# Patient Record
Sex: MALE | Race: WHITE | Employment: OTHER | URBAN - METROPOLITAN AREA
[De-identification: names, ages, dates, MRNs, and addresses within clinical notes are randomized per-mention and may not be internally consistent; named-entity substitution may affect disease eponyms.]

---

## 2017-02-08 ENCOUNTER — HOSPITAL ENCOUNTER (OUTPATIENT)
Dept: LAB | Age: 72
Discharge: HOME | End: 2017-02-08
Payer: MEDICARE

## 2017-02-08 PROCEDURE — 88305 TISSUE EXAM BY PATHOLOGIST: CPT | Performed by: PLASTIC SURGERY

## 2017-03-29 ENCOUNTER — HOSPITAL ENCOUNTER (OUTPATIENT)
Dept: LAB | Age: 72
Discharge: HOME | End: 2017-03-29
Payer: MEDICARE

## 2017-03-29 PROCEDURE — 88305 TISSUE EXAM BY PATHOLOGIST: CPT | Performed by: PLASTIC SURGERY

## 2017-05-09 ENCOUNTER — HOSPITAL ENCOUNTER (OUTPATIENT)
Dept: GENERAL RADIOLOGY | Age: 72
Discharge: HOME | End: 2017-05-09
Payer: MEDICARE

## 2017-05-09 DIAGNOSIS — J18.9 PNEUMONIA, ORGANISM UNSPECIFIED(486): ICD-10-CM

## 2017-05-09 PROCEDURE — 71020 XR CHEST PA LAT: CPT

## 2017-07-27 ENCOUNTER — HOSPITAL ENCOUNTER (OUTPATIENT)
Dept: LAB | Age: 72
Discharge: HOME | End: 2017-07-27
Payer: MEDICARE

## 2017-07-27 LAB
25(OH)D3 SERPL-MCNC: 55.5 NG/ML (ref 30–100)
ALBUMIN SERPL BCP-MCNC: 4.2 G/DL (ref 3.4–5)
ALBUMIN/GLOB SERPL: 1.4 {RATIO} (ref 0.8–1.7)
ALP SERPL-CCNC: 65 U/L (ref 45–117)
ALT SERPL-CCNC: 46 U/L (ref 16–61)
ANION GAP BLD CALC-SCNC: 8 MMOL/L (ref 3–18)
AST SERPL W P-5'-P-CCNC: 22 U/L (ref 15–37)
BILIRUB SERPL-MCNC: 0.5 MG/DL (ref 0.2–1)
BUN SERPL-MCNC: 22 MG/DL (ref 7–18)
BUN/CREAT SERPL: 19 (ref 12–20)
CALCIUM SERPL-MCNC: 8.5 MG/DL (ref 8.5–10.1)
CHLORIDE SERPL-SCNC: 102 MMOL/L (ref 100–108)
CO2 SERPL-SCNC: 27 MMOL/L (ref 21–32)
CREAT SERPL-MCNC: 1.15 MG/DL (ref 0.6–1.3)
ERYTHROCYTE [DISTWIDTH] IN BLOOD BY AUTOMATED COUNT: 13.8 % (ref 11.6–14.5)
GLOBULIN SER CALC-MCNC: 3.1 G/DL (ref 2–4)
GLUCOSE SERPL-MCNC: 269 MG/DL (ref 74–99)
HCT VFR BLD AUTO: 40 % (ref 36–48)
HGB BLD-MCNC: 13.3 G/DL (ref 13–16)
MCH RBC QN AUTO: 29.2 PG (ref 24–34)
MCHC RBC AUTO-ENTMCNC: 33.3 G/DL (ref 31–37)
MCV RBC AUTO: 87.7 FL (ref 74–97)
PLATELET # BLD AUTO: 199 K/UL (ref 135–420)
PMV BLD AUTO: 10.8 FL (ref 9.2–11.8)
POTASSIUM SERPL-SCNC: 4.1 MMOL/L (ref 3.5–5.5)
PROT SERPL-MCNC: 7.3 G/DL (ref 6.4–8.2)
PSA SERPL-MCNC: 0.5 NG/ML (ref 0–4)
RBC # BLD AUTO: 4.56 M/UL (ref 4.7–5.5)
SODIUM SERPL-SCNC: 137 MMOL/L (ref 136–145)
TSH SERPL DL<=0.05 MIU/L-ACNC: 1.67 UIU/ML (ref 0.36–3.74)
VIT B12 SERPL-MCNC: >2000 PG/ML (ref 211–911)
WBC # BLD AUTO: 7.6 K/UL (ref 4.6–13.2)

## 2017-07-27 PROCEDURE — 36415 COLL VENOUS BLD VENIPUNCTURE: CPT | Performed by: INTERNAL MEDICINE

## 2017-07-27 PROCEDURE — 82306 VITAMIN D 25 HYDROXY: CPT | Performed by: INTERNAL MEDICINE

## 2017-07-27 PROCEDURE — 82607 VITAMIN B-12: CPT | Performed by: INTERNAL MEDICINE

## 2017-07-27 PROCEDURE — 84153 ASSAY OF PSA TOTAL: CPT | Performed by: INTERNAL MEDICINE

## 2017-07-27 PROCEDURE — 85027 COMPLETE CBC AUTOMATED: CPT | Performed by: INTERNAL MEDICINE

## 2017-07-27 PROCEDURE — 84443 ASSAY THYROID STIM HORMONE: CPT | Performed by: INTERNAL MEDICINE

## 2017-07-27 PROCEDURE — 80053 COMPREHEN METABOLIC PANEL: CPT | Performed by: INTERNAL MEDICINE

## 2018-12-17 ENCOUNTER — HOSPITAL ENCOUNTER (OUTPATIENT)
Dept: NON INVASIVE DIAGNOSTICS | Age: 73
Discharge: HOME | End: 2018-12-17
Attending: INTERNAL MEDICINE
Payer: MEDICARE

## 2018-12-17 VITALS
HEIGHT: 71 IN | BODY MASS INDEX: 29.4 KG/M2 | DIASTOLIC BLOOD PRESSURE: 90 MMHG | WEIGHT: 210 LBS | SYSTOLIC BLOOD PRESSURE: 150 MMHG

## 2018-12-17 DIAGNOSIS — I25.84 CORONARY ATHEROSCLEROSIS DUE TO SEVERELY CALCIFIED CORONARY LESION: ICD-10-CM

## 2018-12-17 LAB
STRESS ANGINA INDEX: 0
STRESS BASELINE DIAS BP: 90 MMHG
STRESS BASELINE HR: 67 BPM
STRESS BASELINE SYS BP: 150 MMHG
STRESS ESTIMATED WORKLOAD: 7 METS
STRESS EXERCISE DUR MIN: NORMAL
STRESS PEAK DIAS BP: 90 MMHG
STRESS PEAK SYS BP: 180 MMHG
STRESS PERCENT HR ACHIEVED: 119 %
STRESS POST PEAK HR: 151 BPM
STRESS RATE PRESSURE PRODUCT: NORMAL BPM*MMHG
STRESS TARGET HR: 127 BPM

## 2018-12-17 PROCEDURE — A9500 TC99M SESTAMIBI: HCPCS

## 2018-12-17 PROCEDURE — 74011250636 HC RX REV CODE- 250/636: Performed by: INTERNAL MEDICINE

## 2018-12-17 RX ORDER — SODIUM CHLORIDE 9 MG/ML
250 INJECTION, SOLUTION INTRAVENOUS ONCE
Status: COMPLETED | OUTPATIENT
Start: 2018-12-17 | End: 2018-12-17

## 2018-12-17 RX ADMIN — SODIUM CHLORIDE 250 ML: 900 INJECTION, SOLUTION INTRAVENOUS at 09:30

## 2019-02-03 ENCOUNTER — APPOINTMENT (OUTPATIENT)
Dept: CT IMAGING | Age: 74
DRG: 234 | End: 2019-02-03
Attending: EMERGENCY MEDICINE
Payer: MEDICARE

## 2019-02-03 ENCOUNTER — APPOINTMENT (OUTPATIENT)
Dept: NON INVASIVE DIAGNOSTICS | Age: 74
DRG: 234 | End: 2019-02-03
Attending: PHYSICIAN ASSISTANT
Payer: MEDICARE

## 2019-02-03 ENCOUNTER — HOSPITAL ENCOUNTER (INPATIENT)
Age: 74
LOS: 9 days | Discharge: HOME HEALTH | DRG: 234 | End: 2019-02-13
Attending: EMERGENCY MEDICINE | Admitting: INTERNAL MEDICINE
Payer: MEDICARE

## 2019-02-03 ENCOUNTER — APPOINTMENT (OUTPATIENT)
Dept: GENERAL RADIOLOGY | Age: 74
DRG: 234 | End: 2019-02-03
Attending: EMERGENCY MEDICINE
Payer: MEDICARE

## 2019-02-03 DIAGNOSIS — Z95.1 S/P CABG X 5: ICD-10-CM

## 2019-02-03 DIAGNOSIS — R07.9 CHEST PAIN, UNSPECIFIED TYPE: ICD-10-CM

## 2019-02-03 DIAGNOSIS — I20.0 UNSTABLE ANGINA (HCC): Primary | ICD-10-CM

## 2019-02-03 LAB
ALBUMIN SERPL-MCNC: 3.9 G/DL (ref 3.4–5)
ALBUMIN SERPL-MCNC: 4.4 G/DL (ref 3.4–5)
ALBUMIN/GLOB SERPL: 1.1 {RATIO} (ref 0.8–1.7)
ALBUMIN/GLOB SERPL: 1.2 {RATIO} (ref 0.8–1.7)
ALP SERPL-CCNC: 58 U/L (ref 45–117)
ALP SERPL-CCNC: 72 U/L (ref 45–117)
ALT SERPL-CCNC: 47 U/L (ref 16–61)
ALT SERPL-CCNC: 52 U/L (ref 16–61)
ANION GAP SERPL CALC-SCNC: 7 MMOL/L (ref 3–18)
ANION GAP SERPL CALC-SCNC: 8 MMOL/L (ref 3–18)
APPEARANCE UR: CLEAR
APTT PPP: 133.7 SEC (ref 23–36.4)
APTT PPP: >180 SEC (ref 23–36.4)
AST SERPL-CCNC: 24 U/L (ref 15–37)
AST SERPL-CCNC: 63 U/L (ref 15–37)
BASOPHILS # BLD: 0 K/UL (ref 0–0.1)
BASOPHILS NFR BLD: 0 % (ref 0–2)
BILIRUB SERPL-MCNC: 0.3 MG/DL (ref 0.2–1)
BILIRUB SERPL-MCNC: 0.5 MG/DL (ref 0.2–1)
BILIRUB UR QL: NEGATIVE
BUN SERPL-MCNC: 10 MG/DL (ref 7–18)
BUN SERPL-MCNC: 17 MG/DL (ref 7–18)
BUN/CREAT SERPL: 10 (ref 12–20)
BUN/CREAT SERPL: 14 (ref 12–20)
CALCIUM SERPL-MCNC: 8.2 MG/DL (ref 8.5–10.1)
CALCIUM SERPL-MCNC: 8.7 MG/DL (ref 8.5–10.1)
CHLORIDE SERPL-SCNC: 105 MMOL/L (ref 100–108)
CHLORIDE SERPL-SCNC: 106 MMOL/L (ref 100–108)
CK MB CFR SERPL CALC: 1.4 % (ref 0–4)
CK MB CFR SERPL CALC: 4.2 % (ref 0–4)
CK MB CFR SERPL CALC: 5.4 % (ref 0–4)
CK MB CFR SERPL CALC: 6.8 % (ref 0–4)
CK MB SERPL-MCNC: 2.2 NG/ML (ref 5–25)
CK MB SERPL-MCNC: 23.4 NG/ML (ref 5–25)
CK MB SERPL-MCNC: 25.1 NG/ML (ref 5–25)
CK MB SERPL-MCNC: 27.2 NG/ML (ref 5–25)
CK SERPL-CCNC: 152 U/L (ref 39–308)
CK SERPL-CCNC: 367 U/L (ref 39–308)
CK SERPL-CCNC: 508 U/L (ref 39–308)
CK SERPL-CCNC: 555 U/L (ref 39–308)
CO2 SERPL-SCNC: 25 MMOL/L (ref 21–32)
CO2 SERPL-SCNC: 25 MMOL/L (ref 21–32)
COLOR UR: YELLOW
CREAT SERPL-MCNC: 1.05 MG/DL (ref 0.6–1.3)
CREAT SERPL-MCNC: 1.23 MG/DL (ref 0.6–1.3)
DIFFERENTIAL METHOD BLD: ABNORMAL
ECHO AO ASC DIAM: 3.09 CM
ECHO AO ROOT DIAM: 2.83 CM
ECHO LA AREA 4C: 16.3 CM2
ECHO LA VOL 2C: 66.69 ML (ref 18–58)
ECHO LA VOL 4C: 41.3 ML (ref 18–58)
ECHO LA VOL BP: 57.84 ML (ref 18–58)
ECHO LA VOL/BSA BIPLANE: 26.07 ML/M2 (ref 16–28)
ECHO LA VOLUME INDEX A2C: 30.05 ML/M2 (ref 16–28)
ECHO LA VOLUME INDEX A4C: 18.61 ML/M2 (ref 16–28)
ECHO LV E' LATERAL VELOCITY: 12.87 CM/S
ECHO LV E' SEPTAL VELOCITY: 11.21 CM/S
ECHO LV INTERNAL DIMENSION DIASTOLIC: 4.44 CM (ref 4.2–5.9)
ECHO LV INTERNAL DIMENSION SYSTOLIC: 3.11 CM
ECHO LV IVSD: 0.81 CM (ref 0.6–1)
ECHO LV MASS 2D: 136.7 G (ref 88–224)
ECHO LV MASS INDEX 2D: 61.6 G/M2 (ref 49–115)
ECHO LV POSTERIOR WALL DIASTOLIC: 0.89 CM (ref 0.6–1)
ECHO LVOT DIAM: 2.06 CM
ECHO LVOT PEAK GRADIENT: 4.5 MMHG
ECHO LVOT PEAK VELOCITY: 105.69 CM/S
ECHO LVOT VTI: 19.51 CM
ECHO MV A VELOCITY: 82.63 CM/S
ECHO MV E DECELERATION TIME (DT): 168.5 MS
ECHO MV E VELOCITY: 0.62 CM/S
ECHO MV E/A RATIO: 0.01
ECHO MV E/E' LATERAL: 0.05
ECHO MV E/E' RATIO (AVERAGED): 0.05
ECHO MV E/E' SEPTAL: 0.06
ECHO PULMONARY ARTERY SYSTOLIC PRESSURE (PASP): 29 MMHG
ECHO RV TAPSE: 2.76 CM (ref 1.5–2)
ECHO TV REGURGITANT MAX VELOCITY: 252.97 CM/S
ECHO TV REGURGITANT PEAK GRADIENT: 25.6 MMHG
EOSINOPHIL # BLD: 0 K/UL (ref 0–0.4)
EOSINOPHIL NFR BLD: 0 % (ref 0–5)
ERYTHROCYTE [DISTWIDTH] IN BLOOD BY AUTOMATED COUNT: 13.1 % (ref 11.6–14.5)
ERYTHROCYTE [DISTWIDTH] IN BLOOD BY AUTOMATED COUNT: 13.3 % (ref 11.6–14.5)
EST. AVERAGE GLUCOSE BLD GHB EST-MCNC: 154 MG/DL
FLUAV AG NPH QL IA: NEGATIVE
FLUBV AG NOSE QL IA: NEGATIVE
GLOBULIN SER CALC-MCNC: 3.3 G/DL (ref 2–4)
GLOBULIN SER CALC-MCNC: 3.9 G/DL (ref 2–4)
GLUCOSE BLD STRIP.AUTO-MCNC: 144 MG/DL (ref 70–110)
GLUCOSE BLD STRIP.AUTO-MCNC: 144 MG/DL (ref 70–110)
GLUCOSE BLD STRIP.AUTO-MCNC: 175 MG/DL (ref 70–110)
GLUCOSE BLD STRIP.AUTO-MCNC: 184 MG/DL (ref 70–110)
GLUCOSE SERPL-MCNC: 133 MG/DL (ref 74–99)
GLUCOSE SERPL-MCNC: 218 MG/DL (ref 74–99)
GLUCOSE UR STRIP.AUTO-MCNC: >1000 MG/DL
HBA1C MFR BLD: 7 % (ref 4.2–5.6)
HCT VFR BLD AUTO: 38.1 % (ref 36–48)
HCT VFR BLD AUTO: 42 % (ref 36–48)
HGB BLD-MCNC: 12.8 G/DL (ref 13–16)
HGB BLD-MCNC: 14.4 G/DL (ref 13–16)
HGB UR QL STRIP: NEGATIVE
INR PPP: 1.3 (ref 0.8–1.2)
KETONES UR QL STRIP.AUTO: 15 MG/DL
LACTATE BLD-SCNC: 1.39 MMOL/L (ref 0.4–2)
LEUKOCYTE ESTERASE UR QL STRIP.AUTO: NEGATIVE
LYMPHOCYTES # BLD: 0.6 K/UL (ref 0.9–3.6)
LYMPHOCYTES NFR BLD: 7 % (ref 21–52)
MCH RBC QN AUTO: 29 PG (ref 24–34)
MCH RBC QN AUTO: 29.5 PG (ref 24–34)
MCHC RBC AUTO-ENTMCNC: 33.6 G/DL (ref 31–37)
MCHC RBC AUTO-ENTMCNC: 34.3 G/DL (ref 31–37)
MCV RBC AUTO: 86.1 FL (ref 74–97)
MCV RBC AUTO: 86.4 FL (ref 74–97)
MONOCYTES # BLD: 0.9 K/UL (ref 0.05–1.2)
MONOCYTES NFR BLD: 10 % (ref 3–10)
NEUTS SEG # BLD: 7.3 K/UL (ref 1.8–8)
NEUTS SEG NFR BLD: 83 % (ref 40–73)
NITRITE UR QL STRIP.AUTO: NEGATIVE
PH UR STRIP: 6.5 [PH] (ref 5–8)
PLATELET # BLD AUTO: 178 K/UL (ref 135–420)
PLATELET # BLD AUTO: 183 K/UL (ref 135–420)
PMV BLD AUTO: 10.2 FL (ref 9.2–11.8)
PMV BLD AUTO: 10.5 FL (ref 9.2–11.8)
POTASSIUM SERPL-SCNC: 3.9 MMOL/L (ref 3.5–5.5)
POTASSIUM SERPL-SCNC: 4.1 MMOL/L (ref 3.5–5.5)
PROT SERPL-MCNC: 7.2 G/DL (ref 6.4–8.2)
PROT SERPL-MCNC: 8.3 G/DL (ref 6.4–8.2)
PROT UR STRIP-MCNC: NEGATIVE MG/DL
PROTHROMBIN TIME: 15.8 SEC (ref 11.5–15.2)
RBC # BLD AUTO: 4.41 M/UL (ref 4.7–5.5)
RBC # BLD AUTO: 4.88 M/UL (ref 4.7–5.5)
SODIUM SERPL-SCNC: 138 MMOL/L (ref 136–145)
SODIUM SERPL-SCNC: 138 MMOL/L (ref 136–145)
SP GR UR REFRACTOMETRY: 1.01 (ref 1–1.03)
TROPONIN I SERPL-MCNC: 0.02 NG/ML (ref 0–0.04)
TROPONIN I SERPL-MCNC: 3.52 NG/ML (ref 0–0.04)
TROPONIN I SERPL-MCNC: 7.34 NG/ML (ref 0–0.04)
TROPONIN I SERPL-MCNC: 9.09 NG/ML (ref 0–0.04)
TSH SERPL DL<=0.05 MIU/L-ACNC: 0.45 UIU/ML (ref 0.36–3.74)
UROBILINOGEN UR QL STRIP.AUTO: 0.2 EU/DL (ref 0.2–1)
WBC # BLD AUTO: 6.2 K/UL (ref 4.6–13.2)
WBC # BLD AUTO: 8.7 K/UL (ref 4.6–13.2)

## 2019-02-03 PROCEDURE — 65390000012 HC CONDITION CODE 44 OBSERVATION

## 2019-02-03 PROCEDURE — 87804 INFLUENZA ASSAY W/OPTIC: CPT

## 2019-02-03 PROCEDURE — 82550 ASSAY OF CK (CPK): CPT

## 2019-02-03 PROCEDURE — 85027 COMPLETE CBC AUTOMATED: CPT

## 2019-02-03 PROCEDURE — 74011250636 HC RX REV CODE- 250/636: Performed by: PHYSICIAN ASSISTANT

## 2019-02-03 PROCEDURE — 99285 EMERGENCY DEPT VISIT HI MDM: CPT

## 2019-02-03 PROCEDURE — 74011250636 HC RX REV CODE- 250/636: Performed by: EMERGENCY MEDICINE

## 2019-02-03 PROCEDURE — 96360 HYDRATION IV INFUSION INIT: CPT

## 2019-02-03 PROCEDURE — 83605 ASSAY OF LACTIC ACID: CPT

## 2019-02-03 PROCEDURE — 93306 TTE W/DOPPLER COMPLETE: CPT

## 2019-02-03 PROCEDURE — 36415 COLL VENOUS BLD VENIPUNCTURE: CPT

## 2019-02-03 PROCEDURE — 71275 CT ANGIOGRAPHY CHEST: CPT

## 2019-02-03 PROCEDURE — 74011250637 HC RX REV CODE- 250/637: Performed by: EMERGENCY MEDICINE

## 2019-02-03 PROCEDURE — 74011250637 HC RX REV CODE- 250/637: Performed by: INTERNAL MEDICINE

## 2019-02-03 PROCEDURE — 74011250637 HC RX REV CODE- 250/637: Performed by: PHYSICIAN ASSISTANT

## 2019-02-03 PROCEDURE — 96361 HYDRATE IV INFUSION ADD-ON: CPT

## 2019-02-03 PROCEDURE — 85730 THROMBOPLASTIN TIME PARTIAL: CPT

## 2019-02-03 PROCEDURE — 77010033678 HC OXYGEN DAILY

## 2019-02-03 PROCEDURE — 99218 HC RM OBSERVATION: CPT

## 2019-02-03 PROCEDURE — 71045 X-RAY EXAM CHEST 1 VIEW: CPT

## 2019-02-03 PROCEDURE — 93005 ELECTROCARDIOGRAM TRACING: CPT

## 2019-02-03 PROCEDURE — 74011636637 HC RX REV CODE- 636/637: Performed by: INTERNAL MEDICINE

## 2019-02-03 PROCEDURE — 87040 BLOOD CULTURE FOR BACTERIA: CPT

## 2019-02-03 PROCEDURE — 80053 COMPREHEN METABOLIC PANEL: CPT

## 2019-02-03 PROCEDURE — 85025 COMPLETE CBC W/AUTO DIFF WBC: CPT

## 2019-02-03 PROCEDURE — 83036 HEMOGLOBIN GLYCOSYLATED A1C: CPT

## 2019-02-03 PROCEDURE — 74011250636 HC RX REV CODE- 250/636: Performed by: INTERNAL MEDICINE

## 2019-02-03 PROCEDURE — 82962 GLUCOSE BLOOD TEST: CPT

## 2019-02-03 PROCEDURE — 74011636320 HC RX REV CODE- 636/320: Performed by: EMERGENCY MEDICINE

## 2019-02-03 PROCEDURE — 84443 ASSAY THYROID STIM HORMONE: CPT

## 2019-02-03 PROCEDURE — 85610 PROTHROMBIN TIME: CPT

## 2019-02-03 PROCEDURE — 81003 URINALYSIS AUTO W/O SCOPE: CPT

## 2019-02-03 RX ORDER — FUROSEMIDE 10 MG/ML
20 INJECTION INTRAMUSCULAR; INTRAVENOUS DAILY
Status: DISCONTINUED | OUTPATIENT
Start: 2019-02-03 | End: 2019-02-04

## 2019-02-03 RX ORDER — HEPARIN SODIUM 1000 [USP'U]/ML
60 INJECTION, SOLUTION INTRAVENOUS; SUBCUTANEOUS ONCE
Status: COMPLETED | OUTPATIENT
Start: 2019-02-03 | End: 2019-02-03

## 2019-02-03 RX ORDER — MELOXICAM 7.5 MG/1
15 TABLET ORAL DAILY
Status: DISCONTINUED | OUTPATIENT
Start: 2019-02-03 | End: 2019-02-03

## 2019-02-03 RX ORDER — ACETAMINOPHEN 325 MG/1
650 TABLET ORAL
Status: DISCONTINUED | OUTPATIENT
Start: 2019-02-03 | End: 2019-02-08

## 2019-02-03 RX ORDER — OXYCODONE AND ACETAMINOPHEN 5; 325 MG/1; MG/1
1-2 TABLET ORAL
Status: DISCONTINUED | OUTPATIENT
Start: 2019-02-03 | End: 2019-02-08

## 2019-02-03 RX ORDER — SODIUM CHLORIDE 0.9 % (FLUSH) 0.9 %
5-40 SYRINGE (ML) INJECTION AS NEEDED
Status: DISCONTINUED | OUTPATIENT
Start: 2019-02-03 | End: 2019-02-08

## 2019-02-03 RX ORDER — METOPROLOL TARTRATE 25 MG/1
12.5 TABLET, FILM COATED ORAL EVERY 12 HOURS
Status: DISCONTINUED | OUTPATIENT
Start: 2019-02-03 | End: 2019-02-04

## 2019-02-03 RX ORDER — INSULIN LISPRO 100 [IU]/ML
INJECTION, SOLUTION INTRAVENOUS; SUBCUTANEOUS
Status: DISCONTINUED | OUTPATIENT
Start: 2019-02-03 | End: 2019-02-04

## 2019-02-03 RX ORDER — AZITHROMYCIN 250 MG/1
500 TABLET, FILM COATED ORAL DAILY
Status: DISCONTINUED | OUTPATIENT
Start: 2019-02-03 | End: 2019-02-04

## 2019-02-03 RX ORDER — ADHESIVE BANDAGE
30 BANDAGE TOPICAL DAILY PRN
Status: DISCONTINUED | OUTPATIENT
Start: 2019-02-03 | End: 2019-02-08

## 2019-02-03 RX ORDER — ATORVASTATIN CALCIUM 40 MG/1
40 TABLET, FILM COATED ORAL DAILY
Status: DISCONTINUED | OUTPATIENT
Start: 2019-02-03 | End: 2019-02-13 | Stop reason: HOSPADM

## 2019-02-03 RX ORDER — SODIUM CHLORIDE 0.9 % (FLUSH) 0.9 %
5-10 SYRINGE (ML) INJECTION AS NEEDED
Status: DISCONTINUED | OUTPATIENT
Start: 2019-02-03 | End: 2019-02-07 | Stop reason: SDUPTHER

## 2019-02-03 RX ORDER — HEPARIN SODIUM 10000 [USP'U]/100ML
12-25 INJECTION, SOLUTION INTRAVENOUS
Status: DISCONTINUED | OUTPATIENT
Start: 2019-02-03 | End: 2019-02-04

## 2019-02-03 RX ORDER — GUAIFENESIN 100 MG/5ML
324 LIQUID (ML) ORAL
Status: COMPLETED | OUTPATIENT
Start: 2019-02-03 | End: 2019-02-03

## 2019-02-03 RX ORDER — PIOGLITAZONEHYDROCHLORIDE 30 MG/1
30 TABLET ORAL DAILY
COMMUNITY

## 2019-02-03 RX ORDER — GLIMEPIRIDE 2 MG/1
1 TABLET ORAL
Status: DISCONTINUED | OUTPATIENT
Start: 2019-02-03 | End: 2019-02-03

## 2019-02-03 RX ORDER — METFORMIN HYDROCHLORIDE 500 MG/1
1000 TABLET, EXTENDED RELEASE ORAL 2 TIMES DAILY
Status: DISCONTINUED | OUTPATIENT
Start: 2019-02-03 | End: 2019-02-03

## 2019-02-03 RX ORDER — SODIUM CHLORIDE 0.9 % (FLUSH) 0.9 %
5-40 SYRINGE (ML) INJECTION EVERY 8 HOURS
Status: DISCONTINUED | OUTPATIENT
Start: 2019-02-03 | End: 2019-02-08

## 2019-02-03 RX ORDER — PANTOPRAZOLE SODIUM 40 MG/1
40 TABLET, DELAYED RELEASE ORAL
Status: DISCONTINUED | OUTPATIENT
Start: 2019-02-04 | End: 2019-02-13 | Stop reason: HOSPADM

## 2019-02-03 RX ORDER — GUAIFENESIN 100 MG/5ML
81 LIQUID (ML) ORAL DAILY
Status: DISCONTINUED | OUTPATIENT
Start: 2019-02-04 | End: 2019-02-08

## 2019-02-03 RX ORDER — ENOXAPARIN SODIUM 100 MG/ML
40 INJECTION SUBCUTANEOUS EVERY 24 HOURS
Status: DISCONTINUED | OUTPATIENT
Start: 2019-02-03 | End: 2019-02-03

## 2019-02-03 RX ORDER — HEPARIN SODIUM 5000 [USP'U]/ML
5000 INJECTION, SOLUTION INTRAVENOUS; SUBCUTANEOUS EVERY 8 HOURS
Status: DISCONTINUED | OUTPATIENT
Start: 2019-02-03 | End: 2019-02-03

## 2019-02-03 RX ORDER — DOCUSATE SODIUM 100 MG/1
100 CAPSULE, LIQUID FILLED ORAL 2 TIMES DAILY
Status: DISCONTINUED | OUTPATIENT
Start: 2019-02-03 | End: 2019-02-08

## 2019-02-03 RX ORDER — OLMESARTAN MEDOXOMIL 40 MG/1
40 TABLET ORAL DAILY
Status: DISCONTINUED | OUTPATIENT
Start: 2019-02-03 | End: 2019-02-08

## 2019-02-03 RX ADMIN — ACETAMINOPHEN 650 MG: 325 TABLET ORAL at 21:20

## 2019-02-03 RX ADMIN — Medication 10 ML: at 16:38

## 2019-02-03 RX ADMIN — AZITHROMYCIN 500 MG: 250 TABLET, FILM COATED ORAL at 12:36

## 2019-02-03 RX ADMIN — HEPARIN SODIUM 5000 UNITS: 5000 INJECTION INTRAVENOUS; SUBCUTANEOUS at 12:38

## 2019-02-03 RX ADMIN — Medication 10 ML: at 21:20

## 2019-02-03 RX ADMIN — GLIMEPIRIDE 1 MG: 2 TABLET ORAL at 12:39

## 2019-02-03 RX ADMIN — MELOXICAM 15 MG: 7.5 TABLET ORAL at 12:38

## 2019-02-03 RX ADMIN — SODIUM CHLORIDE 1000 ML: 900 INJECTION, SOLUTION INTRAVENOUS at 03:27

## 2019-02-03 RX ADMIN — METOPROLOL TARTRATE 12.5 MG: 25 TABLET ORAL at 08:18

## 2019-02-03 RX ADMIN — IOPAMIDOL 90 ML: 755 INJECTION, SOLUTION INTRAVENOUS at 04:35

## 2019-02-03 RX ADMIN — INSULIN LISPRO 2 UNITS: 100 INJECTION, SOLUTION INTRAVENOUS; SUBCUTANEOUS at 18:44

## 2019-02-03 RX ADMIN — INSULIN LISPRO 4 UNITS: 100 INJECTION, SOLUTION INTRAVENOUS; SUBCUTANEOUS at 12:35

## 2019-02-03 RX ADMIN — METOPROLOL TARTRATE 12.5 MG: 25 TABLET ORAL at 21:19

## 2019-02-03 RX ADMIN — NITROGLYCERIN 1 INCH: 20 OINTMENT TOPICAL at 01:58

## 2019-02-03 RX ADMIN — HEPARIN SODIUM 12 UNITS/KG/HR: 10000 INJECTION, SOLUTION INTRAVENOUS at 15:41

## 2019-02-03 RX ADMIN — ASPIRIN 81 MG 324 MG: 81 TABLET ORAL at 01:59

## 2019-02-03 RX ADMIN — HEPARIN SODIUM 5850 UNITS: 1000 INJECTION INTRAVENOUS; SUBCUTANEOUS at 15:39

## 2019-02-03 RX ADMIN — SODIUM CHLORIDE 925 ML: 900 INJECTION, SOLUTION INTRAVENOUS at 06:04

## 2019-02-03 RX ADMIN — SODIUM CHLORIDE 1000 ML: 900 INJECTION, SOLUTION INTRAVENOUS at 01:59

## 2019-02-03 RX ADMIN — INSULIN LISPRO 2 UNITS: 100 INJECTION, SOLUTION INTRAVENOUS; SUBCUTANEOUS at 21:17

## 2019-02-03 RX ADMIN — DOCUSATE SODIUM 100 MG: 100 CAPSULE, LIQUID FILLED ORAL at 18:44

## 2019-02-03 RX ADMIN — INSULIN LISPRO 4 UNITS: 100 INJECTION, SOLUTION INTRAVENOUS; SUBCUTANEOUS at 08:15

## 2019-02-03 RX ADMIN — ACETAMINOPHEN 650 MG: 325 TABLET ORAL at 15:30

## 2019-02-03 RX ADMIN — ATORVASTATIN CALCIUM 40 MG: 40 TABLET, FILM COATED ORAL at 12:38

## 2019-02-03 NOTE — CONSULTS
[facility] - Consult Note    Consultation request by Salinas Canales MD for advice/opinion related to evaluating Chest pain  Chest pain    Date of  Admission: 2/3/2019  1:23 AM   Primary Care Physician:  Ronak Wyatt MD     Assessment:     Patient Active Problem List   Diagnosis Code    Biliary colic H75.85    Chest pain R07.9     -Chest pain- history is consistent with possible CAD but he may also have an acute bronchitis for the last day. He had a nuclear stress test in December 2018 that was without ischemia concerns. -HTN- elevated on examination but says he has been stable at home.  -DM Type II on oral meds  -Dyslipidemia- on statin    -No primary cardiologist, Dr. Tru Jean PMD     Plan:     -Concerned for possible CAD and will discuss with MD about scheduling for a cardiac cath in the am. He has multiple risk factors, and while he has a mild bronchitis there is concern that he may have some underlying disease.  -Will also obtain echo to examine wall function and EF  -Would not heparinize at this juncture  -Serial enzymes and biomarkers along with EKG's  -Continue with ARB and have added ASA daily and BB.  -More recommendations pending clinical course  -Will follow     History of Present Illness: This is a 72 y.o. male admitted for Chest pain  Chest pain. Patient complains of:  Patient admitted for chest pain. He states that yesterday he started coughing all day and during the evening he felt a ache to his chest, radiating to his neck and L arm. This pain lasted for 2 hours with spontaneous relief. He also admits to having shortness of breath at times with regular activity. At the same time he states that he runs on the treadmill three times a week and has very good exercise tolerance. He had a subjective fever yesterday as well. No vomiting or diarrhea noted.       Cardiac risk factors: family history, dyslipidemia, diabetes mellitus, male gender, hypertension, age      Review of Symptoms:  Except as stated above include:  Constitutional:  negative  Respiratory:  negative  Cardiovascular:  negative  Gastrointestinal: negative  Genitourinary:  negative  Musculoskeletal:  Negative  Neurological:  Negative  Dermatological:  Negative  Endocrinological: Negative  Psychological:  Negative    A comprehensive review of systems was negative except for that written in the HPI. Past Medical History:     Past Medical History:   Diagnosis Date    Arthritis     Diabetes ([facility] [address]     Hypertension          Social History:     Social History     Socioeconomic History    Marital status:      Spouse name: Not on file    Number of children: Not on file    Years of education: Not on file    Highest education level: Not on file   Tobacco Use    Smoking status: Former Smoker   Substance and Sexual Activity    Alcohol use: Yes     Comment: rarely    Drug use: No        Family History:   No family history on file. Medications: Allergies   Allergen Reactions    Augmentin [Amoxicillin-Pot Clavulanate] Other (comments)     Turns his tongue black        Current Facility-Administered Medications   Medication Dose Route Frequency    sodium chloride (NS) flush 5-10 mL  5-10 mL IntraVENous PRN    olmesartan (BENICAR) tablet 40 mg  40 mg Oral DAILY    insulin lispro (HUMALOG) injection   SubCUTAneous AC&HS     Current Outpatient Medications   Medication Sig    oxyCODONE-acetaminophen (PERCOCET) 5-325 mg per tablet Take 1-2 Tabs by mouth every six (6) hours as needed for Pain. Max Daily Amount: 8 Tabs.  glimepiride (AMARYL) 1 mg tablet Take 1 mg by mouth every morning. Indications: TYPE 2 DIABETES MELLITUS    atorvastatin (LIPITOR) 40 mg tablet Take 40 mg by mouth daily.  olmesartan (BENICAR) 20 mg tablet Take 20 mg by mouth daily.  meloxicam (MOBIC) 15 mg tablet Take 15 mg by mouth daily.  exenatide microspheres (BYDUREON) 2 mg serr by SubCUTAneous route.     omeprazole (PRILOSEC) 20 mg capsule Take 20 mg by mouth daily.  cholecalciferol, vitamin D3, (VITAMIN D3) 2,000 unit tab Take  by mouth.  METFORMIN HCL (METFORMIN PO) Take  by mouth. Physical Exam:     Visit Vitals  /73   Pulse 78   Temp 98.5 °F (36.9 °C)   Resp 20   Ht 6' 1\" (1.854 m)   Wt 97.5 kg (215 lb)   SpO2 98%   BMI 28.37 kg/m²     BP Readings from Last 3 Encounters:   02/03/19 146/73   12/17/18 150/90   11/12/15 132/74     Pulse Readings from Last 3 Encounters:   02/03/19 78   11/12/15 99   10/12/15 66     Wt Readings from Last 3 Encounters:   02/03/19 97.5 kg (215 lb)   12/17/18 95.3 kg (210 lb)   11/25/15 85.7 kg (189 lb)       General:  alert, cooperative, no distress, appears stated age  Neck:  nontender, no JVD  Lungs:  clear to auscultation bilaterally  Heart:  regular rate and rhythm, S1, S2 normal, no murmur, click, rub or gallop  Abdomen:  abdomen is soft without significant tenderness, masses, organomegaly or guarding  Extremities:  extremities normal, atraumatic, no cyanosis or edema  Skin: Warm and dry.  no hyperpigmentation, vitiligo, or suspicious lesions  Neuro: alert, oriented x3, affect appropriate, no focal neurological deficits, moves all extremities well, no involuntary movements  Psych: non focal     Data Review:     Recent Labs     02/03/19  0142   WBC 8.7   HGB 14.4   HCT 42.0        Recent Labs     02/03/19  0142      K 3.9      CO2 25   *   BUN 17   CREA 1.23   CA 8.7   ALB 4.4   SGOT 24   ALT 52       Results for orders placed or performed during the hospital encounter of 02/03/19   EKG, 12 LEAD, INITIAL   Result Value Ref Range    Ventricular Rate 83 BPM    Atrial Rate 83 BPM    P-R Interval 150 ms    QRS Duration 98 ms    Q-T Interval 354 ms    QTC Calculation (Bezet) 415 ms    Calculated P Axis 45 degrees    Calculated R Axis -38 degrees    Calculated T Axis 78 degrees    Diagnosis       Normal sinus rhythm  Left axis deviation  Nonspecific ST abnormality  Abnormal ECG  When compared with ECG of 17-DEC-2018 10:15,  No significant change was found         All Cardiac Markers in the last 24 hours:    Lab Results   Component Value Date/Time     02/03/2019 01:42 AM    CKMB 2.2 02/03/2019 01:42 AM    CKND1 1.4 02/03/2019 01:42 AM    TROIQ 0.02 02/03/2019 01:42 AM       Last Lipid:  No results found for: CHOL, CHOLX, CHLST, CHOLV, HDL, LDL, LDLC, DLDLP, TGLX, TRIGL, TRIGP, CHHD, CHHDX    Signed By: JOSE Rodriguez     February 3, 2019

## 2019-02-03 NOTE — Clinical Note
Contrast Dose Calculator:  
Patient's age: 72.  
Patient's sex: Male. Patient weight (kg) = 97.5. Creatinine level (mg/dL) = 1.13. Creatinine clearance (mL/min): 83.  
Contrast concentration (mg/mL) = 300. MACD = 300 mL. Max Contrast dose per Creatinine Cl calculator = 186.75 mL.

## 2019-02-03 NOTE — PROGRESS NOTES
Patient with CP now Increase in Trop - no chest pains C/o cough and headache Patient will need cath in AM - NPO Add IV Heparin and BB Continue ASA Discontinue metformin , amyryl and Mobic For cough likely from - acute bronchitis Continue Zithromax Follow cultures from ED Will Follow with cardiology D/W patient and family in room with him

## 2019-02-03 NOTE — ED PROVIDER NOTES
EMERGENCY DEPARTMENT HISTORY AND PHYSICAL EXAM 
 
4:43 AM 
 
 
Date: 2/3/2019 Patient Name: Mery House. History of Presenting Illness No chief complaint on file. History Provided By: Patient Chief Complaint: CP 
Duration:  11 PM 
Timing:  Acute Location: Not obtained at this time Quality: Not obtained at this time Severity: Moderate Modifying Factors: Took Tylenol and experienced some relief Associated Symptoms: Cough, congestion, and fever Additional History (Context): 4:45 AM Mery Wan is a 72 y.o. male with h/o HTN and diabetes who presents to ED complaining of acute, moderate CP with associated symptoms of cough, congestion, and possible fever onset 11 PM. He took Tylenol PTA and felt relief. The pt stated he has a history of PNA and stated that his symptoms today felt the same as when her had PNA before. He had a stress test done in December and the results were nml. Denies taking Asprin today or being on blood thinners. PCP: Silviano Sanchez MD 
 
 
 
Current Facility-Administered Medications Medication Dose Route Frequency Provider Last Rate Last Dose  metoprolol tartrate (LOPRESSOR) tablet 12.5 mg  12.5 mg Oral ONCE Gobraeel, Clearance MD Marcus      
 metoprolol tartrate (LOPRESSOR) tablet 25 mg  25 mg Oral Q12H Christie Rivas MD      
 sodium chloride (NS) flush 5-10 mL  5-10 mL IntraVENous PRN Clementina Ch DO      
 atorvastatin (LIPITOR) tablet 40 mg  40 mg Oral DAILY Shafqat, Vonne Aschoff, MD   40 mg at 02/03/19 1238  olmesartan (BENICAR) tablet 40 mg  40 mg Oral DAILY Shafqat, Vonne Aschoff, MD      
 pantoprazole (PROTONIX) tablet 40 mg  40 mg Oral ACB Shafqat, Vonne Aschoff, MD      
 oxyCODONE-acetaminophen (PERCOCET) 5-325 mg per tablet 1-2 Tab  1-2 Tab Oral Q6H PRN Shafqat, Vonne Aschoff, MD      
 furosemide (LASIX) injection 20 mg  20 mg IntraVENous DAILY Shafqat, Vonne Aschoff, MD      
  azithromycin (ZITHROMAX) tablet 500 mg  500 mg Oral DAILY Dewey Maldonado MD   500 mg at 19 1236  
 insulin lispro (HUMALOG) injection   SubCUTAneous AC&HS Dewey Maldonado MD   2 Units at 19  aspirin chewable tablet 81 mg  81 mg Oral DAILY Michael Venkat Alanicolama      
 sodium chloride (NS) flush 5-40 mL  5-40 mL IntraVENous Q8H Michael Leonora Robledobama   10 mL at 19  
 sodium chloride (NS) flush 5-40 mL  5-40 mL IntraVENous PRN JOSE Valle      
 magnesium hydroxide (MILK OF MAGNESIA) 400 mg/5 mL oral suspension 30 mL  30 mL Oral DAILY PRN JOSE Valle      
 docusate sodium (COLACE) capsule 100 mg  100 mg Oral BID Claire JOSE Do   100 mg at 19 1844  
 heparin 25,000 units in D5W 250 ml infusion  12-25 Units/kg/hr IntraVENous TITRATE Carolina Vallema 11 mL/hr at 19 2245 1,100 Units/hr at 19 2245  acetaminophen (TYLENOL) tablet 650 mg  650 mg Oral Q4H PRN Viktor Peterson MD   650 mg at 19 Past History Past Medical History: 
Past Medical History:  
Diagnosis Date  Arthritis  Diabetes ([address]  Hypertension Past Surgical History: 
Past Surgical History:  
Procedure Laterality Date  HX HEENT    
 tumor removed from neck  HX ORTHOPAEDIC    
 elbow  LAP,CHOLECYSTECTOMY  11/12/15 Dr. Nacho Skaggs  NEUROLOGICAL PROCEDURE UNLISTED    
 back surgery x2 Family History: 
Family History Problem Relation Age of Onset  Cancer Mother  Heart Failure Father Social History: 
Social History Tobacco Use  Smoking status: Former Smoker Packs/day: 3.00 Years: 33.00 Pack years: 99.00 Last attempt to quit: 1990 Years since quittin.1  Smokeless tobacco: Former User Substance Use Topics  Alcohol use: Yes Comment: rarely  Drug use: No  
 
 
Allergies: Allergies Allergen Reactions  Augmentin [Amoxicillin-Pot Clavulanate] Other (comments) Turns his tongue black Review of Systems Review of Systems Constitutional: Positive for fever. Negative for activity change and fatigue. HENT: Positive for congestion. Negative for rhinorrhea. Eyes: Negative for visual disturbance. Respiratory: Positive for cough. Negative for shortness of breath. Cardiovascular: Positive for chest pain. Negative for palpitations. Gastrointestinal: Negative for abdominal pain, diarrhea, nausea and vomiting. Genitourinary: Negative for dysuria and hematuria. Musculoskeletal: Negative for back pain. Skin: Negative for rash. Neurological: Negative for dizziness, weakness and light-headedness. All other systems reviewed and are negative. Physical Exam  
 
Visit Vitals /83 (BP 1 Location: Right arm, BP Patient Position: At rest) Pulse 77 Temp 98.9 °F (37.2 °C) Resp 18 Ht 6' 1\" (1.854 m) Wt 97.5 kg (215 lb) SpO2 96% BMI 28.37 kg/m² Physical Exam  
Constitutional: He appears well-developed and well-nourished. Hypertensive HENT:  
Head: Normocephalic and atraumatic. Eyes: Conjunctivae are normal. Pupils are equal, round, and reactive to light. Neck: Normal range of motion. Neck supple. Cardiovascular: Normal rate and regular rhythm. Pulmonary/Chest: Effort normal and breath sounds normal.  
Abdominal: Soft. Bowel sounds are normal.  
Musculoskeletal: Normal range of motion. Lymphadenopathy:  
  He has no cervical adenopathy. Neurological: He is alert. Skin: Skin is warm. Psychiatric: He has a normal mood and affect. Diagnostic Study Results Labs - Recent Results (from the past 12 hour(s)) CARDIAC PANEL,(CK, CKMB & TROPONIN) Collection Time: 02/03/19  3:57 PM  
Result Value Ref Range  (H) 39 - 308 U/L  
 CK - MB 27.2 (H) <3.6 ng/ml CK-MB Index 5.4 (H) 0.0 - 4.0 %  Troponin-I, QT 7.34 (HH) 0.0 - 0.045 NG/ML  
 METABOLIC PANEL, COMPREHENSIVE Collection Time: 02/03/19  3:57 PM  
Result Value Ref Range Sodium 138 136 - 145 mmol/L Potassium 4.1 3.5 - 5.5 mmol/L Chloride 106 100 - 108 mmol/L  
 CO2 25 21 - 32 mmol/L Anion gap 7 3.0 - 18 mmol/L Glucose 133 (H) 74 - 99 mg/dL BUN 10 7.0 - 18 MG/DL Creatinine 1.05 0.6 - 1.3 MG/DL  
 BUN/Creatinine ratio 10 (L) 12 - 20 GFR est AA >60 >60 ml/min/1.73m2 GFR est non-AA >60 >60 ml/min/1.73m2 Calcium 8.2 (L) 8.5 - 10.1 MG/DL Bilirubin, total 0.3 0.2 - 1.0 MG/DL  
 ALT (SGPT) 47 16 - 61 U/L  
 AST (SGOT) 63 (H) 15 - 37 U/L Alk. phosphatase 58 45 - 117 U/L Protein, total 7.2 6.4 - 8.2 g/dL Albumin 3.9 3.4 - 5.0 g/dL Globulin 3.3 2.0 - 4.0 g/dL A-G Ratio 1.2 0.8 - 1.7 TSH 3RD GENERATION Collection Time: 02/03/19  3:57 PM  
Result Value Ref Range TSH 0.45 0.36 - 3.74 uIU/mL CBC W/O DIFF Collection Time: 02/03/19  3:57 PM  
Result Value Ref Range WBC 6.2 4.6 - 13.2 K/uL  
 RBC 4.41 (L) 4.70 - 5.50 M/uL  
 HGB 12.8 (L) 13.0 - 16.0 g/dL HCT 38.1 36.0 - 48.0 % MCV 86.4 74.0 - 97.0 FL  
 MCH 29.0 24.0 - 34.0 PG  
 MCHC 33.6 31.0 - 37.0 g/dL  
 RDW 13.3 11.6 - 14.5 % PLATELET 934 020 - 527 K/uL MPV 10.5 9.2 - 11.8 FL PROTHROMBIN TIME + INR Collection Time: 02/03/19  3:57 PM  
Result Value Ref Range Prothrombin time 15.8 (H) 11.5 - 15.2 sec INR 1.3 (H) 0.8 - 1.2 PTT Collection Time: 02/03/19  3:57 PM  
Result Value Ref Range aPTT >180.0 (HH) 23.0 - 36.4 SEC GLUCOSE, POC Collection Time: 02/03/19  4:59 PM  
Result Value Ref Range Glucose (POC) 144 (H) 70 - 110 mg/dL GLUCOSE, POC Collection Time: 02/03/19  9:13 PM  
Result Value Ref Range Glucose (POC) 144 (H) 70 - 110 mg/dL CARDIAC PANEL,(CK, CKMB & TROPONIN) Collection Time: 02/03/19  9:45 PM  
Result Value Ref Range  (H) 39 - 308 U/L  
 CK - MB 23.4 (H) <3.6 ng/ml  CK-MB Index 4.2 (H) 0.0 - 4.0 %  
 Troponin-I, QT 9.09 (HH) 0.0 - 0.045 NG/ML  
PTT Collection Time: 02/03/19  9:45 PM  
Result Value Ref Range aPTT 133.7 (H) 23.0 - 36.4 SEC Radiologic Studies -  
XR CHEST PORT Final Result IMPRESSION: No active process. CTA CHEST W OR W WO CONT Final Result IMPRESSION:  
1. No evidence of pulmonary embolus or aortic dissection. EKG: normal EKG, normal sinus rhythm at 83 with no acute ischemic changes. Medical Decision Making It should be noted that IErma DO 
 will be the provider of record for this patient. I reviewed the vital signs, available nursing notes, past medical history, past surgical history, family history and social history. Vital Signs-Reviewed the patient's vital signs. Records Reviewed: Nursing Notes and Old Medical Records (Time of Review: 4:43 AM) ED Course: Progress Notes, Reevaluation, and Consults: 
CP resolving with NTG Provider Notes (Medical Decision Making):  
Patient with c/o CP. States cough at home No fever in ED No congestion noted Patient is hypertensive Recent neg stress test in Dec 
Given ASA for CP States it is left sided and goes into his L arm. Resolved with NTG 
EKG wnl Trop neg No acute findings on CTA Given concerning story and presentation for unstable angina, and absolutely no sign of infection, will admit for further evaluation Cardiology consulted and will see the patient. D/w hospitalist who will admit. Patient and wife agree with plan. Diagnosis Clinical Impression: 1. Unstable angina (HCC) Disposition: Admit Follow-up Information None Medication List  
  
ASK your doctor about these medications ACTOS 30 mg tablet Generic drug:  pioglitazone BENICAR 20 mg tablet Generic drug:  olmesartan BYDUREON 2 mg Serr Generic drug:  exenatide microspheres 
  
glimepiride 1 mg tablet Commonly known as:  AMARYL 
  
LIPITOR 40 mg tablet Generic drug:  atorvastatin 
  
meloxicam 15 mg tablet Commonly known as:  MOBIC METFORMIN PO 
  
PriLOSEC 20 mg capsule Generic drug:  omeprazole VITAMIN D3 2,000 unit Tab Generic drug:  cholecalciferol (vitamin D3) 
  
  
 
_______________________________ Scribe Attestation I-Kezia Duran acting as a scribe for and in the presence of [address] [place] [address], DO February 03, 2019 at 4:43 AM 
    
Provider Attestation:     
I personally performed the services described in the documentation, reviewed the documentation, as recorded by the scribe in my presence, and it accurately and completely records my words and actions. February 03, 2019 at 4:43 AM - Edna BAY DO   
 
 
_______________________________

## 2019-02-03 NOTE — PROGRESS NOTES
Echocardiogram completed. Patient returned to room with armband in place. Report to follow.  
 
[address] [facility]

## 2019-02-03 NOTE — PROGRESS NOTES
Discharge Planning: 
Reason for Admission:   CHEST PAIN 
                
RRAT Score:    5 Plan for utilizing home health:    PENDING Likelihood of Readmission:  LOW Transition of Care Plan:   HOME WITH FAMILY SUPPORT · Pt downgraded to Code 44 · Code 44 will be printed on behalf of this pt · This writer discussed with pt and family the pt is now on Observation status · OBS paperwork signed by pt and placed in this pt's chart · Pt reports that his plan is to return home with family support Care Management Interventions PCP Verified by CM: Aldo Vera) Last Visit to PCP: 01/09/19 Palliative Care Criteria Met (RRAT>21 & CHF Dx)?: No 
Mode of Transport at Discharge: Other (see comment)(FAMILY) Transition of Care Consult (CM Consult): Discharge Planning Current Support Network: Lives with Spouse, Own Home, Family Lives [facility] Confirm Follow Up Transport: Family Plan discussed with Pt/Family/Caregiver: Yes(SUPPORTIVE FAMILY AND CLOSE GROUP OF SUPPORTIVE FRIENDS) [employer] Information Provided?: No 
Discharge Location Discharge Placement: Home with family assistance [facility] Care Manager [phone]

## 2019-02-03 NOTE — Clinical Note
TRANSFER - IN REPORT:  
 
Verbal report received from: Anna Nolan, RN. Yandy Aguillon Report consisted of patient's Situation, Background, Assessment and  
Recommendations(SBAR). Opportunity for questions and clarification was provided. Assessment completed upon patient's arrival to unit and care assumed. Patient transported with a Cardiac Cath Tech / Patient Care Tech.

## 2019-02-03 NOTE — H&P
History & Physical 
Patient: Jass Russell MRN: [id number]  CSN: [id number] YOB: 1945  Age: 72 y.o. Sex: male DOA: 2/3/2019 Chest pain HPI:  
 
Jass Russell is a 72 y.o. male who has a PMH of HTN, DM came to ER c/o Cp since yesterday. He was resting when the chest pain started. He has been coughing a lot for the last 2 days a lot. He has had fever and chills. He had a normal cardiac stress test in December 2018. He has been wheezing also. Past Medical History:  
Diagnosis Date  Arthritis  Diabetes ([address]  Hypertension Past Surgical History:  
Procedure Laterality Date  HX HEENT    
 tumor removed from neck  HX ORTHOPAEDIC    
 elbow  LAP,CHOLECYSTECTOMY  11/12/15 Dr. Bri Nam  NEUROLOGICAL PROCEDURE UNLISTED    
 back surgery x2 No family history on file. Social History Socioeconomic History  Marital status:  Spouse name: Not on file  Number of children: Not on file  Years of education: Not on file  Highest education level: Not on file Tobacco Use  Smoking status: Former Smoker Substance and Sexual Activity  Alcohol use: Yes Comment: rarely  Drug use: No  
 
 
Prior to Admission medications Medication Sig Start Date End Date Taking? Authorizing Provider  
oxyCODONE-acetaminophen (PERCOCET) 5-325 mg per tablet Take 1-2 Tabs by mouth every six (6) hours as needed for Pain. Max Daily Amount: 8 Tabs. 11/12/15   Yolande Juarez MD  
glimepiride (AMARYL) 1 mg tablet Take 1 mg by mouth every morning. Indications: TYPE 2 DIABETES MELLITUS    Provider, Historical  
atorvastatin (LIPITOR) 40 mg tablet Take 40 mg by mouth daily. Provider, Historical  
olmesartan (BENICAR) 20 mg tablet Take 20 mg by mouth daily. Provider, Historical  
meloxicam (MOBIC) 15 mg tablet Take 15 mg by mouth daily. Provider, Historical  
exenatide microspheres (BYDUREON) 2 mg serr by SubCUTAneous route. Provider, Historical  
omeprazole (PRILOSEC) 20 mg capsule Take 20 mg by mouth daily. Provider, Historical  
cholecalciferol, vitamin D3, (VITAMIN D3) 2,000 unit tab Take  by mouth. Provider, Historical  
METFORMIN HCL (METFORMIN PO) Take  by mouth. Provider, Historical  
 
 
Allergies Allergen Reactions  Augmentin [Amoxicillin-Pot Clavulanate] Other (comments) Turns his tongue black Review of Systems GENERAL: No fevers or chills. HEENT: No change in vision, no earache, tinnitus, sore throat or sinus congestion. NECK: No pain or stiffness. CARDIOVASCULAR: + chest pain or pressure. No palpitations. PULMONARY: No shortness of breath, + cough or wheeze. GASTROINTESTINAL: No abdominal pain, nausea, vomiting or diarrhea, melena or       bright red blood per rectum. GENITOURINARY: No urinary frequency, urgency, hesitancy or dysuria. MUSCULOSKELETAL: No joint or muscle pain, no back pain, no recent trauma. DERMATOLOGIC: No rash, no itching, no lesions. ENDOCRINE: No polyuria, polydipsia, no heat or cold intolerance. No recent change in    weight. HEMATOLOGICAL: No anemia or easy bruising or bleeding. NEUROLOGIC: No headache, seizures, numbness, tingling or weakness. PSYCHIATRIC: No depression, anxiety, mood disorder, no loss of interest in normal       activity or change in sleep pattern. Physical Exam:  
 
Physical Exam: 
Visit Vitals /73 Pulse 78 Temp 98.5 °F (36.9 °C) Resp 20 Ht 6' 1\" (1.854 m) Wt 97.5 kg (215 lb) SpO2 98% BMI 28.37 kg/m² O2 Flow Rate (L/min): 2 l/min O2 Device: Room air Temp (24hrs), Av.5 °F (36.9 °C), Min:98.4 °F (36.9 °C), Max:98.5 °F (36.9 °C) No intake/output data recorded.  1901 -  0700 In: 2925 [I.V.:2925] Out: 900 [Urine:900] General:  Alert, cooperative, no distress, appears stated age. Head:  Normocephalic, without obvious abnormality, atraumatic. Eyes:  Conjunctivae/corneas clear. PERRL, EOMs intact. Nose: Nares normal. No drainage or sinus tenderness. Throat: Lips, mucosa, and tongue normal. Teeth and gums normal.  
Neck: Supple, symmetrical, trachea midline, no adenopathy, thyroid: no enlargement/tenderness/nodules, no carotid bruit and no JVD. Back:   ROM normal. No CVA tenderness. Lungs:   Fine crackles present on auscultation bilaterally. Chest wall:  No tenderness or deformity. Heart:  Regular rate and rhythm, S1, S2 normal, no murmur, click, rub or gallop. Abdomen: Soft, non-tender. Bowel sounds normal. No masses,  No organomegaly. Extremities: Extremities normal, atraumatic, no cyanosis or edema. Pulses: 2+ and symmetric all extremities. Skin: Skin color, texture, turgor normal. No rashes or lesions Neurologic: CNII-XII intact. No focal motor or sensory deficit. Labs Reviewed: 
 
Recent Results (from the past 24 hour(s)) EKG, 12 LEAD, INITIAL Collection Time: 02/03/19  1:23 AM  
Result Value Ref Range Ventricular Rate 83 BPM  
 Atrial Rate 83 BPM  
 P-R Interval 150 ms QRS Duration 98 ms Q-T Interval 354 ms QTC Calculation (Bezet) 415 ms Calculated P Axis 45 degrees Calculated R Axis -38 degrees Calculated T Axis 78 degrees Diagnosis Normal sinus rhythm Left axis deviation Nonspecific ST abnormality Abnormal ECG When compared with ECG of 17-DEC-2018 10:15, No significant change was found INFLUENZA A & B AG (RAPID TEST) Collection Time: 02/03/19  1:35 AM  
Result Value Ref Range Influenza A Antigen NEGATIVE  NEG Influenza B Antigen NEGATIVE  NEG    
CBC WITH AUTOMATED DIFF Collection Time: 02/03/19  1:42 AM  
Result Value Ref Range WBC 8.7 4.6 - 13.2 K/uL  
 RBC 4.88 4.70 - 5.50 M/uL  
 HGB 14.4 13.0 - 16.0 g/dL HCT 42.0 36.0 - 48.0 % MCV 86.1 74.0 - 97.0 FL  
 MCH 29.5 24.0 - 34.0 PG  
 MCHC 34.3 31.0 - 37.0 g/dL  
 RDW 13.1 11.6 - 14.5 % PLATELET 604 934 - 289 K/uL MPV 10.2 9.2 - 11.8 FL  
 NEUTROPHILS 83 (H) 40 - 73 % LYMPHOCYTES 7 (L) 21 - 52 % MONOCYTES 10 3 - 10 % EOSINOPHILS 0 0 - 5 % BASOPHILS 0 0 - 2 %  
 ABS. NEUTROPHILS 7.3 1.8 - 8.0 K/UL  
 ABS. LYMPHOCYTES 0.6 (L) 0.9 - 3.6 K/UL  
 ABS. MONOCYTES 0.9 0.05 - 1.2 K/UL  
 ABS. EOSINOPHILS 0.0 0.0 - 0.4 K/UL  
 ABS. BASOPHILS 0.0 0.0 - 0.1 K/UL  
 DF AUTOMATED METABOLIC PANEL, COMPREHENSIVE Collection Time: 02/03/19  1:42 AM  
Result Value Ref Range Sodium 138 136 - 145 mmol/L Potassium 3.9 3.5 - 5.5 mmol/L Chloride 105 100 - 108 mmol/L  
 CO2 25 21 - 32 mmol/L Anion gap 8 3.0 - 18 mmol/L Glucose 218 (H) 74 - 99 mg/dL BUN 17 7.0 - 18 MG/DL Creatinine 1.23 0.6 - 1.3 MG/DL  
 BUN/Creatinine ratio 14 12 - 20 GFR est AA >60 >60 ml/min/1.73m2 GFR est non-AA 58 (L) >60 ml/min/1.73m2 Calcium 8.7 8.5 - 10.1 MG/DL Bilirubin, total 0.5 0.2 - 1.0 MG/DL  
 ALT (SGPT) 52 16 - 61 U/L  
 AST (SGOT) 24 15 - 37 U/L Alk. phosphatase 72 45 - 117 U/L Protein, total 8.3 (H) 6.4 - 8.2 g/dL Albumin 4.4 3.4 - 5.0 g/dL Globulin 3.9 2.0 - 4.0 g/dL A-G Ratio 1.1 0.8 - 1.7 CARDIAC PANEL,(CK, CKMB & TROPONIN) Collection Time: 02/03/19  1:42 AM  
Result Value Ref Range  39 - 308 U/L  
 CK - MB 2.2 <3.6 ng/ml CK-MB Index 1.4 0.0 - 4.0 % Troponin-I, QT 0.02 0.0 - 0.045 NG/ML  
CULTURE, BLOOD Collection Time: 02/03/19  1:45 AM  
Result Value Ref Range Special Requests: NO SPECIAL REQUESTS Culture result: NO GROWTH AFTER 4 HOURS    
POC LACTIC ACID Collection Time: 02/03/19  2:03 AM  
Result Value Ref Range Lactic Acid (POC) 1.39 0.40 - 2.00 mmol/L  
URINALYSIS W/ RFLX MICROSCOPIC Collection Time: 02/03/19  3:20 AM  
Result Value Ref Range Color YELLOW Appearance CLEAR Specific gravity 1.014 1.005 - 1.030    
 pH (UA) 6.5 5.0 - 8.0 Protein NEGATIVE  NEG mg/dL Glucose >1,000 (A) NEG mg/dL Ketone 15 (A) NEG mg/dL Bilirubin NEGATIVE  NEG Blood NEGATIVE  NEG Urobilinogen 0.2 0.2 - 1.0 EU/dL Nitrites NEGATIVE  NEG Leukocyte Esterase NEGATIVE  NEG Procedures/imaging: see electronic medical records for all procedures/Xrays and details which were not copied into this note but were reviewed prior to creation of Plan Assessment/Plan Active Problems: 1. Chest pain (2/3/2019) - serial troponin levels. - cardiology consult. 2. Acute bronchitis. - Zithromax 500 mg q day PO. 
 
3. HTN 
- increase the Olmesartan to 40 mg q day. 4. NIDDM 
- ISS 5. Code status - Full code 6. DVT prophylaxis 
- SQ heparin Caleb Gallagher MD 
February 3, 2019

## 2019-02-03 NOTE — ED NOTES
Patient is sitting up in bed and eating breakfast. He is alert and oriented. Patient states he does not need anything at this time.

## 2019-02-03 NOTE — Clinical Note
TRANSFER - OUT REPORT:  
 
Verbal report given to: Amanda Johnson RN. Report consisted of patient's Situation, Background, Assessment and  
Recommendations(SBAR). Opportunity for questions and clarification was provided. Patient transported with a Cardiac Cath Tech / Patient Care Tech. Patient transported to: [address]

## 2019-02-03 NOTE — ED TRIAGE NOTES
Chest congestion and coughing up yellow mucus. Chest pain started aroung 2300 and radiates into his left arm.

## 2019-02-04 LAB
ALBUMIN SERPL-MCNC: 4.2 G/DL (ref 3.4–5)
ALBUMIN/GLOB SERPL: 1.2 {RATIO} (ref 0.8–1.7)
ALP SERPL-CCNC: 62 U/L (ref 45–117)
ALT SERPL-CCNC: 54 U/L (ref 16–61)
ANION GAP SERPL CALC-SCNC: 7 MMOL/L (ref 3–18)
APPEARANCE UR: CLEAR
AST SERPL-CCNC: 89 U/L (ref 15–37)
ATRIAL RATE: 68 BPM
ATRIAL RATE: 83 BPM
ATRIAL RATE: 83 BPM
ATRIAL RATE: 86 BPM
BILIRUB SERPL-MCNC: 0.5 MG/DL (ref 0.2–1)
BILIRUB UR QL: NEGATIVE
BUN SERPL-MCNC: 11 MG/DL (ref 7–18)
BUN/CREAT SERPL: 10 (ref 12–20)
CALCIUM SERPL-MCNC: 8.9 MG/DL (ref 8.5–10.1)
CALCULATED P AXIS, ECG09: 42 DEGREES
CALCULATED P AXIS, ECG09: 45 DEGREES
CALCULATED P AXIS, ECG09: 50 DEGREES
CALCULATED P AXIS, ECG09: 72 DEGREES
CALCULATED R AXIS, ECG10: -38 DEGREES
CALCULATED R AXIS, ECG10: -48 DEGREES
CALCULATED R AXIS, ECG10: -55 DEGREES
CALCULATED R AXIS, ECG10: -55 DEGREES
CALCULATED T AXIS, ECG11: -26 DEGREES
CALCULATED T AXIS, ECG11: 13 DEGREES
CALCULATED T AXIS, ECG11: 46 DEGREES
CALCULATED T AXIS, ECG11: 78 DEGREES
CHLORIDE SERPL-SCNC: 107 MMOL/L (ref 100–108)
CHOLEST SERPL-MCNC: 100 MG/DL
CO2 SERPL-SCNC: 26 MMOL/L (ref 21–32)
COLOR UR: YELLOW
CREAT SERPL-MCNC: 1.13 MG/DL (ref 0.6–1.3)
DIAGNOSIS, 93000: NORMAL
GLOBULIN SER CALC-MCNC: 3.5 G/DL (ref 2–4)
GLUCOSE BLD STRIP.AUTO-MCNC: 126 MG/DL (ref 70–110)
GLUCOSE BLD STRIP.AUTO-MCNC: 174 MG/DL (ref 70–110)
GLUCOSE SERPL-MCNC: 109 MG/DL (ref 74–99)
GLUCOSE UR STRIP.AUTO-MCNC: NEGATIVE MG/DL
HDLC SERPL-MCNC: 50 MG/DL (ref 40–60)
HDLC SERPL: 2 {RATIO} (ref 0–5)
HGB UR QL STRIP: NEGATIVE
KETONES UR QL STRIP.AUTO: ABNORMAL MG/DL
LDLC SERPL CALC-MCNC: 33.8 MG/DL (ref 0–100)
LEUKOCYTE ESTERASE UR QL STRIP.AUTO: NEGATIVE
LIPID PROFILE,FLP: NORMAL
NITRITE UR QL STRIP.AUTO: NEGATIVE
P-R INTERVAL, ECG05: 148 MS
P-R INTERVAL, ECG05: 150 MS
P-R INTERVAL, ECG05: 152 MS
P-R INTERVAL, ECG05: 152 MS
PH UR STRIP: 7.5 [PH] (ref 5–8)
POTASSIUM SERPL-SCNC: 4 MMOL/L (ref 3.5–5.5)
PROT SERPL-MCNC: 7.7 G/DL (ref 6.4–8.2)
PROT UR STRIP-MCNC: NEGATIVE MG/DL
Q-T INTERVAL, ECG07: 348 MS
Q-T INTERVAL, ECG07: 354 MS
Q-T INTERVAL, ECG07: 354 MS
Q-T INTERVAL, ECG07: 386 MS
QRS DURATION, ECG06: 100 MS
QRS DURATION, ECG06: 94 MS
QRS DURATION, ECG06: 96 MS
QRS DURATION, ECG06: 98 MS
QTC CALCULATION (BEZET), ECG08: 408 MS
QTC CALCULATION (BEZET), ECG08: 410 MS
QTC CALCULATION (BEZET), ECG08: 415 MS
QTC CALCULATION (BEZET), ECG08: 423 MS
SODIUM SERPL-SCNC: 140 MMOL/L (ref 136–145)
SP GR UR REFRACTOMETRY: 1.01 (ref 1–1.03)
TRIGL SERPL-MCNC: 81 MG/DL (ref ?–150)
UROBILINOGEN UR QL STRIP.AUTO: 0.2 EU/DL (ref 0.2–1)
VENTRICULAR RATE, ECG03: 68 BPM
VENTRICULAR RATE, ECG03: 83 BPM
VENTRICULAR RATE, ECG03: 83 BPM
VENTRICULAR RATE, ECG03: 86 BPM
VLDLC SERPL CALC-MCNC: 16.2 MG/DL

## 2019-02-04 PROCEDURE — 93005 ELECTROCARDIOGRAM TRACING: CPT

## 2019-02-04 PROCEDURE — 80053 COMPREHEN METABOLIC PANEL: CPT

## 2019-02-04 PROCEDURE — 74011636637 HC RX REV CODE- 636/637: Performed by: INTERNAL MEDICINE

## 2019-02-04 PROCEDURE — 82962 GLUCOSE BLOOD TEST: CPT

## 2019-02-04 PROCEDURE — 74011000250 HC RX REV CODE- 250: Performed by: INTERNAL MEDICINE

## 2019-02-04 PROCEDURE — 74011250637 HC RX REV CODE- 250/637: Performed by: PHYSICIAN ASSISTANT

## 2019-02-04 PROCEDURE — 74011250637 HC RX REV CODE- 250/637: Performed by: INTERNAL MEDICINE

## 2019-02-04 PROCEDURE — 65660000004 HC RM CVT STEPDOWN

## 2019-02-04 PROCEDURE — 74011250636 HC RX REV CODE- 250/636: Performed by: INTERNAL MEDICINE

## 2019-02-04 PROCEDURE — 36415 COLL VENOUS BLD VENIPUNCTURE: CPT

## 2019-02-04 PROCEDURE — 99152 MOD SED SAME PHYS/QHP 5/>YRS: CPT | Performed by: INTERNAL MEDICINE

## 2019-02-04 PROCEDURE — 80061 LIPID PANEL: CPT

## 2019-02-04 PROCEDURE — C1769 GUIDE WIRE: HCPCS | Performed by: INTERNAL MEDICINE

## 2019-02-04 PROCEDURE — 74011250636 HC RX REV CODE- 250/636

## 2019-02-04 PROCEDURE — 77030015766: Performed by: INTERNAL MEDICINE

## 2019-02-04 PROCEDURE — 93458 L HRT ARTERY/VENTRICLE ANGIO: CPT | Performed by: INTERNAL MEDICINE

## 2019-02-04 PROCEDURE — 4A023N7 MEASUREMENT OF CARDIAC SAMPLING AND PRESSURE, LEFT HEART, PERCUTANEOUS APPROACH: ICD-10-PCS | Performed by: INTERNAL MEDICINE

## 2019-02-04 PROCEDURE — 81003 URINALYSIS AUTO W/O SCOPE: CPT

## 2019-02-04 PROCEDURE — 99218 HC RM OBSERVATION: CPT

## 2019-02-04 PROCEDURE — 74011636320 HC RX REV CODE- 636/320: Performed by: INTERNAL MEDICINE

## 2019-02-04 PROCEDURE — 87040 BLOOD CULTURE FOR BACTERIA: CPT

## 2019-02-04 PROCEDURE — 77030013797 HC KT TRNSDUC PRSSR EDWD -A: Performed by: INTERNAL MEDICINE

## 2019-02-04 PROCEDURE — C1894 INTRO/SHEATH, NON-LASER: HCPCS | Performed by: INTERNAL MEDICINE

## 2019-02-04 PROCEDURE — 77030027845 HC BND COM RDL D-STAT TELE -B: Performed by: INTERNAL MEDICINE

## 2019-02-04 RX ORDER — VERAPAMIL HYDROCHLORIDE 2.5 MG/ML
INJECTION, SOLUTION INTRAVENOUS AS NEEDED
Status: DISCONTINUED | OUTPATIENT
Start: 2019-02-04 | End: 2019-02-04 | Stop reason: HOSPADM

## 2019-02-04 RX ORDER — SODIUM CHLORIDE 0.9 % (FLUSH) 0.9 %
5-40 SYRINGE (ML) INJECTION EVERY 8 HOURS
Status: DISCONTINUED | OUTPATIENT
Start: 2019-02-04 | End: 2019-02-07 | Stop reason: SDUPTHER

## 2019-02-04 RX ORDER — HEPARIN SODIUM 1000 [USP'U]/ML
INJECTION, SOLUTION INTRAVENOUS; SUBCUTANEOUS AS NEEDED
Status: DISCONTINUED | OUTPATIENT
Start: 2019-02-04 | End: 2019-02-04 | Stop reason: HOSPADM

## 2019-02-04 RX ORDER — METOPROLOL TARTRATE 25 MG/1
12.5 TABLET, FILM COATED ORAL ONCE
Status: COMPLETED | OUTPATIENT
Start: 2019-02-04 | End: 2019-02-04

## 2019-02-04 RX ORDER — MIDAZOLAM HYDROCHLORIDE 1 MG/ML
INJECTION, SOLUTION INTRAMUSCULAR; INTRAVENOUS AS NEEDED
Status: DISCONTINUED | OUTPATIENT
Start: 2019-02-04 | End: 2019-02-04 | Stop reason: HOSPADM

## 2019-02-04 RX ORDER — DEXTROSE 50 % IN WATER (D50W) INTRAVENOUS SYRINGE
25-50 AS NEEDED
Status: DISCONTINUED | OUTPATIENT
Start: 2019-02-04 | End: 2019-02-08

## 2019-02-04 RX ORDER — INSULIN LISPRO 100 [IU]/ML
INJECTION, SOLUTION INTRAVENOUS; SUBCUTANEOUS
Status: DISCONTINUED | OUTPATIENT
Start: 2019-02-05 | End: 2019-02-08

## 2019-02-04 RX ORDER — FENTANYL CITRATE 50 UG/ML
INJECTION, SOLUTION INTRAMUSCULAR; INTRAVENOUS AS NEEDED
Status: DISCONTINUED | OUTPATIENT
Start: 2019-02-04 | End: 2019-02-04 | Stop reason: HOSPADM

## 2019-02-04 RX ORDER — ENOXAPARIN SODIUM 100 MG/ML
1 INJECTION SUBCUTANEOUS EVERY 12 HOURS
Status: DISCONTINUED | OUTPATIENT
Start: 2019-02-04 | End: 2019-02-04

## 2019-02-04 RX ORDER — SODIUM CHLORIDE 0.9 % (FLUSH) 0.9 %
5-40 SYRINGE (ML) INJECTION AS NEEDED
Status: DISCONTINUED | OUTPATIENT
Start: 2019-02-04 | End: 2019-02-07 | Stop reason: SDUPTHER

## 2019-02-04 RX ORDER — LEVOFLOXACIN 5 MG/ML
500 INJECTION, SOLUTION INTRAVENOUS EVERY 24 HOURS
Status: DISCONTINUED | OUTPATIENT
Start: 2019-02-04 | End: 2019-02-05

## 2019-02-04 RX ORDER — METOPROLOL TARTRATE 25 MG/1
25 TABLET, FILM COATED ORAL EVERY 12 HOURS
Status: DISCONTINUED | OUTPATIENT
Start: 2019-02-04 | End: 2019-02-05

## 2019-02-04 RX ORDER — HEPARIN SODIUM 10000 [USP'U]/100ML
10.3-25 INJECTION, SOLUTION INTRAVENOUS
Status: DISCONTINUED | OUTPATIENT
Start: 2019-02-04 | End: 2019-02-08

## 2019-02-04 RX ORDER — LIDOCAINE HYDROCHLORIDE 10 MG/ML
INJECTION, SOLUTION EPIDURAL; INFILTRATION; INTRACAUDAL; PERINEURAL AS NEEDED
Status: DISCONTINUED | OUTPATIENT
Start: 2019-02-04 | End: 2019-02-04 | Stop reason: HOSPADM

## 2019-02-04 RX ORDER — HEPARIN SODIUM 10000 [USP'U]/100ML
12-25 INJECTION, SOLUTION INTRAVENOUS
Status: DISCONTINUED | OUTPATIENT
Start: 2019-02-04 | End: 2019-02-04 | Stop reason: SDUPTHER

## 2019-02-04 RX ORDER — MAGNESIUM SULFATE 100 %
4 CRYSTALS MISCELLANEOUS AS NEEDED
Status: DISCONTINUED | OUTPATIENT
Start: 2019-02-04 | End: 2019-02-08

## 2019-02-04 RX ADMIN — PANTOPRAZOLE SODIUM 40 MG: 40 TABLET, DELAYED RELEASE ORAL at 06:54

## 2019-02-04 RX ADMIN — ENOXAPARIN SODIUM 100 MG: 100 INJECTION SUBCUTANEOUS at 08:13

## 2019-02-04 RX ADMIN — Medication 10 ML: at 23:17

## 2019-02-04 RX ADMIN — OXYCODONE AND ACETAMINOPHEN 2 TABLET: 5; 325 TABLET ORAL at 14:19

## 2019-02-04 RX ADMIN — METOPROLOL TARTRATE 25 MG: 25 TABLET ORAL at 19:19

## 2019-02-04 RX ADMIN — HEPARIN SODIUM 10.3 UNITS/KG/HR: 10000 INJECTION, SOLUTION INTRAVENOUS at 19:27

## 2019-02-04 RX ADMIN — DOCUSATE SODIUM 100 MG: 100 CAPSULE, LIQUID FILLED ORAL at 08:13

## 2019-02-04 RX ADMIN — Medication 10 ML: at 23:16

## 2019-02-04 RX ADMIN — INSULIN LISPRO 4 UNITS: 100 INJECTION, SOLUTION INTRAVENOUS; SUBCUTANEOUS at 09:17

## 2019-02-04 RX ADMIN — OXYCODONE AND ACETAMINOPHEN 2 TABLET: 5; 325 TABLET ORAL at 08:14

## 2019-02-04 RX ADMIN — LEVOFLOXACIN 500 MG: 5 INJECTION, SOLUTION INTRAVENOUS at 06:54

## 2019-02-04 RX ADMIN — ATORVASTATIN CALCIUM 40 MG: 40 TABLET, FILM COATED ORAL at 08:13

## 2019-02-04 RX ADMIN — METOPROLOL TARTRATE 25 MG: 25 TABLET ORAL at 08:13

## 2019-02-04 RX ADMIN — ASPIRIN 81 MG 81 MG: 81 TABLET ORAL at 08:13

## 2019-02-04 RX ADMIN — Medication 10 ML: at 06:54

## 2019-02-04 RX ADMIN — METOPROLOL TARTRATE 12.5 MG: 25 TABLET ORAL at 02:53

## 2019-02-04 RX ADMIN — OLMESARTAN MEDOXOMIL 40 MG: 40 TABLET, COATED ORAL at 08:13

## 2019-02-04 RX ADMIN — ACETAMINOPHEN 650 MG: 325 TABLET ORAL at 06:54

## 2019-02-04 NOTE — PROGRESS NOTES
Patient tolerated heart catheterization without difficulty. He will be transferred to stepdown unit. His coronary angiography reveals small RPDA with 95% stenosis. Unfortunately, he has mid to distal left main 65% stenosis as well as mid LAD 80-85% stenosis. He has overall normal left ventricular systolic function. I feel that his best option is to consider open heart surgery with bypass to LAD, circumflex. Hopefully, if the RPDA can be bypass, that would be beneficial. 
 
Discussed the options with the patient. He understands and agrees.

## 2019-02-04 NOTE — PROGRESS NOTES
[facility]  -  Progress Note Patient: Nery Julian. MRN: [id number]  SSN: [id number] YOB: 1945  Age: 72 y.o. Sex: male Admit Date: 2/3/2019 Assessment:  
 
Hospital Problems  Date Reviewed: 11/25/2015 Codes Class Noted POA Chest pain ICD-10-CM: R07.9 ICD-9-CM: 786.50  2/3/2019 Unknown  
   
  
 
-Chest pain- history is consistent with possible CAD but he may also have an acute bronchitis for the last day. He had a nuclear stress test in December 2018 that was without ischemia concerns. -NSTEMI- with peak troponin at 9. On heparin drip 
-Acute bronchitis- on abx -HTN- elevated on examination but says he has been stable at home. 
-DM Type II on oral meds 
-Dyslipidemia- on statin 
  
-No primary cardiologist, Dr. Staci Raymond PMD 
 
Plan:  
 
Stable and will proceed with cath today for definitive evaluation of coronaries. Consent signed and all questions answered. Had a fever this am with normal WBC. On abx for bronchitis More recommendations pending cath. Subjective: No new complaints. No chest pain Objective:  
  
Patient Vitals for the past 8 hrs: 
 Temp Pulse Resp BP SpO2  
02/04/19 0809 99.7 °F (37.6 °C) 77 20 (!) 169/91 94 % 02/04/19 0406 (!) 101 °F (38.3 °C) 89 17 160/90 96 % Patient Vitals for the past 96 hrs: 
 Weight  
02/03/19 1058 97.5 kg (215 lb) 02/03/19 0131 97.5 kg (215 lb) Intake/Output Summary (Last 24 hours) at 2/4/2019 3475 Last data filed at 2/4/2019 2633 Gross per 24 hour Intake 278.61 ml Output  Net 278.61 ml Physical Exam: 
General:  alert, cooperative, no distress, appears stated age Neck:  nontender, no JVD Lungs:  clear to auscultation bilaterally Heart:  regular rate and rhythm, S1, S2 normal, no murmur, click, rub or gallop Extremities:  extremities normal, atraumatic, no cyanosis or edema Data Review:  
 
Labs: Results:  
   
Chemistry Recent Labs 02/04/19 5612 02/03/19 
1557 02/03/19 
0142 * 133* 218*  138 138  
K 4.0 4.1 3.9  106 105 CO2 26 25 25 BUN 11 10 17 CREA 1.13 1.05 1.23  
CA 8.9 8.2* 8.7 AGAP 7 7 8 BUCR 10* 10* 14 AP 62 58 72  
TP 7.7 7.2 8.3* ALB 4.2 3.9 4.4 GLOB 3.5 3.3 3.9 AGRAT 1.2 1.2 1.1  
  
CBC w/Diff Recent Labs 02/03/19 
1557 02/03/19 0142 WBC 6.2 8.7  
RBC 4.41* 4.88  
HGB 12.8* 14.4 HCT 38.1 42.0  183 GRANS  --  83* LYMPH  --  7* EOS  --  0 Cardiac Enzymes Lab Results Component Value Date/Time  (H) 02/03/2019 09:45 PM  
  (H) 02/03/2019 03:57 PM  
  (H) 02/03/2019 09:43 AM  
 CKMB 23.4 (H) 02/03/2019 09:45 PM  
 CKMB 27.2 (H) 02/03/2019 03:57 PM  
 CKMB 25.1 (H) 02/03/2019 09:43 AM  
 CKND1 4.2 (H) 02/03/2019 09:45 PM  
 CKND1 5.4 (H) 02/03/2019 03:57 PM  
 CKND1 6.8 (H) 02/03/2019 09:43 AM  
 TROIQ 9.09 (HH) 02/03/2019 09:45 PM  
 TROIQ 7.34 (HH) 02/03/2019 03:57 PM  
 TROIQ 3.52 ([facility]) 02/03/2019 09:43 AM  
  
Coagulation Recent Labs 02/03/19 
2145 02/03/19 1557 PTP  --  15.8* INR  --  1.3* APTT 133.7* >180.0* Lipid Panel Lab Results Component Value Date/Time Cholesterol, total 100 02/04/2019 02:15 AM  
 HDL Cholesterol 50 02/04/2019 02:15 AM  
 LDL, calculated 33.8 02/04/2019 02:15 AM  
 VLDL, calculated 16.2 02/04/2019 02:15 AM  
 Triglyceride 81 02/04/2019 02:15 AM  
 CHOL/HDL Ratio 2.0 02/04/2019 02:15 AM  
  
BNP No results found for: BNP, BNPP, XBNPT Liver Enzymes Recent Labs 02/04/19 
0215 TP 7.7 ALB 4.2 AP 62 SGOT 89* Digoxin Thyroid Studies Lab Results Component Value Date/Time  TSH 0.45 02/03/2019 03:57 PM

## 2019-02-04 NOTE — ROUTINE PROCESS
Bedside and Verbal shift change report given to Edilson Christianson (oncoming nurse) by Kimmie Bernabe RN (offgoing nurse). Report included the following information SBAR, Kardex, Procedure Summary, Intake/Output and Recent Results.

## 2019-02-04 NOTE — PROGRESS NOTES
[facility] Hospitalist Group Progress Note Patient: Nery Julian. Age: 72 y.o. : 1945 MR#: [id number] SSN: [id number] Date/Time: 2019 Subjective:  
 
Review of systems No cp now no sob Cough cough with yellow sputum - less in severity C/o headache Had fever this AM  
 
Assessment/Plan: 1. Chest pain 2 Elevated troponin  
3 NSTEMI - by enzymes 4 Fever - likely bronchitis - so far negative cultures , on empiric Levaquin ( changed overnight from Zithromax ) PLAN  
- Patient is on lovenox for NSTEMI ( lost IV ) - For cath - defer to cardiology  
- continue other management Case discussed with:  [x]Patient  [x] Family ( In room with patient )    []Nursing  []Case Management DVT Prophylaxis:  [x]Lovenox  []Hep SQ  []SCDs  []Coumadin   []On Heparin gtt Objective:  
VS:  
Visit Vitals BP (!) 169/91 (BP 1 Location: Right arm) Pulse 77 Temp 99.7 °F (37.6 °C) Resp 20 Ht 6' 1\" (1.854 m) Wt 97.5 kg (215 lb) SpO2 94% BMI 28.37 kg/m² Tmax/24hrs: Temp (24hrs), Av.5 °F (37.5 °C), Min:98.3 °F (36.8 °C), Max:101 °F (38.3 °C) IOBRIEF Intake/Output Summary (Last 24 hours) at 2019 1044 Last data filed at 2019 5333 Gross per 24 hour Intake 278.61 ml Output  Net 278.61 ml General:  Alert, cooperative, no acute distress Cardiovascular: S1S2 - regular , No Murmur Pulmonary: Equal expansion , No Use of accessory muscles , No Rales No Rhonchi GI:  +BS in all four quadrants, soft, non-tender Extremities:  No edema; 2+ dorsalis pedis pulses bilaterally Neuro: Alert and oriented X 2. Medications:  
Current Facility-Administered Medications Medication Dose Route Frequency  metoprolol tartrate (LOPRESSOR) tablet 25 mg  25 mg Oral Q12H  
 enoxaparin (LOVENOX) injection 100 mg  1 mg/kg SubCUTAneous Q12H  levoFLOXacin (LEVAQUIN) 500 mg in D5W IVPB  500 mg IntraVENous Q24H  sodium chloride (NS) flush 5-10 mL  5-10 mL IntraVENous PRN  
 atorvastatin (LIPITOR) tablet 40 mg  40 mg Oral DAILY  olmesartan (BENICAR) tablet 40 mg  40 mg Oral DAILY  pantoprazole (PROTONIX) tablet 40 mg  40 mg Oral ACB  oxyCODONE-acetaminophen (PERCOCET) 5-325 mg per tablet 1-2 Tab  1-2 Tab Oral Q6H PRN  
 insulin lispro (HUMALOG) injection   SubCUTAneous AC&HS  aspirin chewable tablet 81 mg  81 mg Oral DAILY  sodium chloride (NS) flush 5-40 mL  5-40 mL IntraVENous Q8H  
 sodium chloride (NS) flush 5-40 mL  5-40 mL IntraVENous PRN  
 magnesium hydroxide (MILK OF MAGNESIA) 400 mg/5 mL oral suspension 30 mL  30 mL Oral DAILY PRN  
 docusate sodium (COLACE) capsule 100 mg  100 mg Oral BID  acetaminophen (TYLENOL) tablet 650 mg  650 mg Oral Q4H PRN Labs:   
Recent Labs 02/03/19 
1557 02/03/19 
0142 WBC 6.2 8.7 HGB 12.8* 14.4 HCT 38.1 42.0  183 Recent Labs 02/04/19 
0215 02/03/19 
1557 02/03/19 
0142  138 138  
K 4.0 4.1 3.9  106 105 CO2 26 25 25 * 133* 218* BUN 11 10 17 CREA 1.13 1.05 1.23  
CA 8.9 8.2* 8.7 ALB 4.2 3.9 4.4 SGOT 89* 63* 24 ALT 54 47 52 INR  --  1.3*  --   
 
 
 
Signed By: Deanie Bamberger, MD   
 February 4, 2019

## 2019-02-04 NOTE — PROGRESS NOTES
02/04/19 1841 Vital Signs Cardiac Rhythm NSR Level of Consciousness Alert /88 MAP (Monitor) 122 Oxygen Therapy O2 Device Room air Modified Paul Score Activity 2 Respiration 2 Circulation 2 Consciousness 2 O2 Saturation 2 Paul Score 10 Neuro Neuro (WDL) WDL Pain 1 Pain Scale 1 Numeric (0 - 10) Pain Intensity 1 0 Post-Procedure Site Assessment (1) Wound Type Puncture Location Radial  
Orientation  Right Site Assessment No bleeding; No hematoma;Dry/intact (old blood at site. site soft) Respiratory Respiratory (WDL) WDL  
received patient from cath lab. Family in with patient. Dr Jeff Ambrosio in to discuss findings with patient and family.

## 2019-02-04 NOTE — PROGRESS NOTES
Pt lost his IV Will d/c heparin gtt and start Lovenox 1 mg/kg Heavy productive cough and a fever>> Levaquin Elevated troponin >> cath in AM

## 2019-02-04 NOTE — PROGRESS NOTES
conducted an initial consultation and Spiritual Assessment for Ibis Tucker, who is a 72 y.o.,male. Patients Primary Language is: [place]. According to the patients EMR Anabaptist Affiliation is: No preference. The reason the Patient came to the hospital is:  
Patient Active Problem List  
 Diagnosis Date Noted  Chest pain 02/03/2019  Biliary colic 86/46/6973 The  provided the following Interventions: 
Initiated a relationship of care and support. Explored issues of matt, belief, spirituality and Zoroastrian/ritual needs while hospitalized. Listened empathically. Provided information about Spiritual Care Services. Offered prayer and assurance of continued prayers on patient's behalf. Chart reviewed. The following outcomes where achieved: 
Patient shared limited information about both their medical narrative and spiritual journey/beliefs. Patient processed feeling about current hospitalization. Patient expressed gratitude for 's visit. Assessment: 
Patient does not have any Zoroastrian/cultural needs that will affect patients preferences in health care. There are no spiritual or Zoroastrian issues which require intervention at this time. Plan: 
Chaplains will continue to follow and will provide pastoral care on an as needed/requested basis.  recommends bedside caregivers page  on duty if patient shows signs of acute spiritual or emotional distress. [address] [facility]  
[phone]

## 2019-02-05 LAB
ANION GAP SERPL CALC-SCNC: 5 MMOL/L (ref 3–18)
APTT PPP: 120.6 SEC (ref 23–36.4)
APTT PPP: 86.4 SEC (ref 23–36.4)
BASOPHILS # BLD: 0 K/UL (ref 0–0.1)
BASOPHILS NFR BLD: 0 % (ref 0–2)
BUN SERPL-MCNC: 17 MG/DL (ref 7–18)
BUN/CREAT SERPL: 12 (ref 12–20)
CALCIUM SERPL-MCNC: 8.6 MG/DL (ref 8.5–10.1)
CHLORIDE SERPL-SCNC: 105 MMOL/L (ref 100–108)
CO2 SERPL-SCNC: 29 MMOL/L (ref 21–32)
CREAT SERPL-MCNC: 1.45 MG/DL (ref 0.6–1.3)
DIFFERENTIAL METHOD BLD: ABNORMAL
EOSINOPHIL # BLD: 0 K/UL (ref 0–0.4)
EOSINOPHIL NFR BLD: 1 % (ref 0–5)
ERYTHROCYTE [DISTWIDTH] IN BLOOD BY AUTOMATED COUNT: 13.3 % (ref 11.6–14.5)
GLUCOSE BLD STRIP.AUTO-MCNC: 145 MG/DL (ref 70–110)
GLUCOSE BLD STRIP.AUTO-MCNC: 156 MG/DL (ref 70–110)
GLUCOSE BLD STRIP.AUTO-MCNC: 165 MG/DL (ref 70–110)
GLUCOSE BLD STRIP.AUTO-MCNC: 165 MG/DL (ref 70–110)
GLUCOSE SERPL-MCNC: 178 MG/DL (ref 74–99)
HCT VFR BLD AUTO: 40.3 % (ref 36–48)
HGB BLD-MCNC: 14.1 G/DL (ref 13–16)
LYMPHOCYTES # BLD: 1 K/UL (ref 0.9–3.6)
LYMPHOCYTES NFR BLD: 23 % (ref 21–52)
MCH RBC QN AUTO: 30.2 PG (ref 24–34)
MCHC RBC AUTO-ENTMCNC: 35 G/DL (ref 31–37)
MCV RBC AUTO: 86.3 FL (ref 74–97)
MONOCYTES # BLD: 0.5 K/UL (ref 0.05–1.2)
MONOCYTES NFR BLD: 11 % (ref 3–10)
NEUTS SEG # BLD: 2.8 K/UL (ref 1.8–8)
NEUTS SEG NFR BLD: 65 % (ref 40–73)
PLATELET # BLD AUTO: 155 K/UL (ref 135–420)
PMV BLD AUTO: 10.2 FL (ref 9.2–11.8)
POTASSIUM SERPL-SCNC: 4 MMOL/L (ref 3.5–5.5)
RBC # BLD AUTO: 4.67 M/UL (ref 4.7–5.5)
SODIUM SERPL-SCNC: 139 MMOL/L (ref 136–145)
TROPONIN I SERPL-MCNC: 5.19 NG/ML (ref 0–0.04)
WBC # BLD AUTO: 4.3 K/UL (ref 4.6–13.2)

## 2019-02-05 PROCEDURE — 80048 BASIC METABOLIC PNL TOTAL CA: CPT

## 2019-02-05 PROCEDURE — 74011250637 HC RX REV CODE- 250/637: Performed by: PHYSICIAN ASSISTANT

## 2019-02-05 PROCEDURE — 84484 ASSAY OF TROPONIN QUANT: CPT

## 2019-02-05 PROCEDURE — 36415 COLL VENOUS BLD VENIPUNCTURE: CPT

## 2019-02-05 PROCEDURE — 82962 GLUCOSE BLOOD TEST: CPT

## 2019-02-05 PROCEDURE — 74011636637 HC RX REV CODE- 636/637: Performed by: INTERNAL MEDICINE

## 2019-02-05 PROCEDURE — 74011250636 HC RX REV CODE- 250/636: Performed by: INTERNAL MEDICINE

## 2019-02-05 PROCEDURE — 74011250637 HC RX REV CODE- 250/637: Performed by: INTERNAL MEDICINE

## 2019-02-05 PROCEDURE — 65660000004 HC RM CVT STEPDOWN

## 2019-02-05 PROCEDURE — 85025 COMPLETE CBC W/AUTO DIFF WBC: CPT

## 2019-02-05 PROCEDURE — 85730 THROMBOPLASTIN TIME PARTIAL: CPT

## 2019-02-05 RX ORDER — METOPROLOL TARTRATE 50 MG/1
50 TABLET ORAL EVERY 12 HOURS
Status: DISCONTINUED | OUTPATIENT
Start: 2019-02-05 | End: 2019-02-08

## 2019-02-05 RX ORDER — LEVOFLOXACIN 250 MG/1
250 TABLET ORAL EVERY 24 HOURS
Status: COMPLETED | OUTPATIENT
Start: 2019-02-06 | End: 2019-02-07

## 2019-02-05 RX ADMIN — Medication 10 ML: at 06:32

## 2019-02-05 RX ADMIN — INSULIN LISPRO 2 UNITS: 100 INJECTION, SOLUTION INTRAVENOUS; SUBCUTANEOUS at 22:10

## 2019-02-05 RX ADMIN — Medication 10 ML: at 22:11

## 2019-02-05 RX ADMIN — INSULIN LISPRO 2 UNITS: 100 INJECTION, SOLUTION INTRAVENOUS; SUBCUTANEOUS at 08:22

## 2019-02-05 RX ADMIN — INSULIN LISPRO 2 UNITS: 100 INJECTION, SOLUTION INTRAVENOUS; SUBCUTANEOUS at 17:12

## 2019-02-05 RX ADMIN — LEVOFLOXACIN 500 MG: 5 INJECTION, SOLUTION INTRAVENOUS at 06:31

## 2019-02-05 RX ADMIN — Medication 10 ML: at 14:00

## 2019-02-05 RX ADMIN — OLMESARTAN MEDOXOMIL 40 MG: 40 TABLET, COATED ORAL at 08:22

## 2019-02-05 RX ADMIN — HEPARIN SODIUM 900 UNITS/HR: 10000 INJECTION, SOLUTION INTRAVENOUS at 22:24

## 2019-02-05 RX ADMIN — METOPROLOL TARTRATE 50 MG: 50 TABLET ORAL at 22:10

## 2019-02-05 RX ADMIN — ATORVASTATIN CALCIUM 40 MG: 40 TABLET, FILM COATED ORAL at 08:22

## 2019-02-05 RX ADMIN — PANTOPRAZOLE SODIUM 40 MG: 40 TABLET, DELAYED RELEASE ORAL at 06:33

## 2019-02-05 RX ADMIN — ASPIRIN 81 MG 81 MG: 81 TABLET ORAL at 08:22

## 2019-02-05 RX ADMIN — METOPROLOL TARTRATE 25 MG: 25 TABLET ORAL at 08:22

## 2019-02-05 RX ADMIN — DOCUSATE SODIUM 100 MG: 100 CAPSULE, LIQUID FILLED ORAL at 17:13

## 2019-02-05 NOTE — PROGRESS NOTES
Problem: Falls - Risk of 
Goal: *Absence of Falls Document Yesi Garrett Fall Risk and appropriate interventions in the flowsheet. Outcome: Progressing Towards Goal 
Fall Risk Interventions: 
  
 
  
 
Medication Interventions: Patient to call before getting OOB, Teach patient to arise slowly

## 2019-02-05 NOTE — PROGRESS NOTES
Bedside and Verbal shift change report given to Francisca (oncoming nurse) by [employer] (offgoing nurse). Report included the following information SBAR, Kardex, Intake/Output, MAR, Recent Results and Quality Measures.

## 2019-02-05 NOTE — DIABETES MGMT
Diabetes Patient/Family Education RecordFactors That  May Influence Patients Ability  to Learn or  Comply with Recommendations []   Language barrier    []   Cultural needs   []   Motivation  
 []   Cognitive limitation    []   Physical   [x]   Education  
 []   Physiological factors   []   Hearing/vision/speaking impairment   []   Yarsanism beliefs []   Financial factors   []  Other:   []  No factors identified at this time. Person Instructed: [x]   Patient   [x]   Family: Wife and another family member. []  Other Preference for Learning: 
 [x]   Verbal   [x]   Written   []  Demonstration Level of Comprehension & Competence:   
[x]  Good                                      [] Fair                                     []  Poor                             []  Needs Reinforcement  
[x]  Teachback completed Education Component:  
[x]  Medication management, including how to administer insulin (if appropriate) and potential medication interactions: Yes. Patient reported home diabetes medications prior to this hospital admission and educated patient: 
Actos 30 mg once daily. Helps muscle cells make better use of the body's insulin. Glimepride 1 mg daily every morning. Helps pancreas make more insulin. Metformin 500 mg twice daily with meals. Helps to lower the amount of sugar made by liver and also helps muscle cells make better use of the body's insulin. Bydureon 2 mg SQ every 7 days. Helps pancreas release more insulin, slows movement of food through the stomach, stops liver from releasing sugar, and reduces appetite. [x]  Nutritional management -obtain usual meal pattern: Yes. Patient reported that he has regular eating pattern but not following recommendations about serving size/portion control of carbs (starches, fruits, dairy). Patient stated that he already attended diabetes classes which included nutrition.  Patient stated that he will follow nutrition recommendation for management of diabetes. [x]  Exercise: Yes. Patient is able to tolerate regular walking exercise when not feeling sick. [x]  Signs, symptoms, and treatment of hyperglycemia and hypoglycemia: Yes. Discussed treatment for diabetes to help keep blood sugar control. He agreed that diabetes med is effective when regular physical exercise and diet is included. [x] Prevention, recognition and treatment of hyperglycemia and hypoglycemia: Yes. Discussed low blood sugar. Patient reported history of low blood sugar in the past but none in a long time. Patient is able to recognize symptoms, causes, treatment, prevention, and when to call his medical provider - or call 911 for emergency medical help if he's unsuccessful in treating low blood sugar to above 70. [x]  Importance of blood glucose monitoring and how to obtain a blood glucose meter: Yes. Patient reported that he has BG meter and testing supplies, but need to monitor his blood sugar regularly. []  Instruction on use of the blood glucose meter [x]  Discuss the importance of HbA1C monitoring: Yes. Patient reported prior A1c level of around 6.4%. Discussed with patient that this current A1c level is 7.3% (2/3/2019) which is equivalent to estimated average blood glucose of 163 mg/dL during the past 2-3 months. Patient verbalized understanding that his current A1c is above the recommended <7%. Encouraged him to follow his diabetes treatment plan. [x]  Sick day guidelines: Yes. Discussed with patient that blood sugar will be running when sick/ill therefore it is recommended to cont taking diabetes medications. Get emergency medical help when blood sugar is 300 and higher - symptoms include nausea, vomiting, abd pain, shortness of breath. [x]  Proper use and disposal of lancets, needles, syringes or insulin pens (if appropriate): Yes. [x]  Potential long-term complications (retinopathy, kidney disease, neuropathy, foot care): Yes. [x] Information about whom to contact in case of emergency or for more information: Yes. [x]  Goal:  Patient/family will demonstrate understanding of Diabetes Self Management Skills by: 02/12/2019 Plan for post-discharge education or self-management support: 
  [x] Outpatient class schedule provided            [] Patient Declined 
  [] Scheduled for outpatient classes (date) _______ Verify: 
Does patient understand how diabetes medications work? Yes. Educated patient. Does patient know what their most recent A1c is? Yes. Does patient monitor glucose at home? Yes. Does patient have difficulty obtaining diabetes medications and testing supplies? No. 
  
Morgan Landaverde RN 
[phone]

## 2019-02-05 NOTE — CONSULTS
CARDIOTHORACIC SURGERY CONSULTATION NOTE    2/5/2019  3:17 PM    I am seeing this patient for the first time in consultation at the request of Dr. Gisell Castrejon, with whom I have discussed the patient's history and test results, specifically the cardiac catheterization. I have also reviewed his records and pertinent studies, and have obtained additional information on the patient's history from the patient's spouse who was present during the evaluation. Glenn Drake is a 72 y.o. male who presents with chest pain. The pain is moderate and is located substernally with radiation to the left arm and neck. It occurred once before (this past December) in association with exertion, and then again three days ago during what sounds like an episode of bronchitis (persistent cough productive of thick phlegm and fevers). He seen in the ED and was found to subsequently have an elevated troponin. He has had mild SOB with the bronchitis, but denies claudication, dizziness, dyspnea on exertion, fatigue, lower extremity edema, near-syncope, orthopnea, palpitations, paroxysmal nocturnal dyspnea, syncope, tachypnea. A nuclear stress test two months ago revealed an EF of 55% with no ischemia. Cardiac risk factors include family history, dyslipidemia, diabetes mellitus, hypertension. There is no recent history of smoking/ tobacco exposure. Past Medical History:   Diagnosis Date    Arthritis     Diabetes ([address]     Hypertension        The past medical history is also notable for pneumonia.     Past Surgical History:   Procedure Laterality Date    HX HEENT      tumor removed from neck    HX ORTHOPAEDIC      elbow     LAP,CHOLECYSTECTOMY  11/12/15    Dr. Destinee Paul      back surgery x2       Present medications include   Current Facility-Administered Medications   Medication Dose Route Frequency Provider Last Rate Last Dose    [START ON 2/6/2019] levoFLOXacin (LEVAQUIN) tablet 250 mg 250 mg Oral Q24H Barby Fry MD        metoprolol tartrate (LOPRESSOR) tablet 50 mg  50 mg Oral Q12H Majo Landaverde PA        sodium chloride (NS) flush 5-40 mL  5-40 mL IntraVENous Q8H Harshil Chery MD   10 mL at 02/05/19 1400    sodium chloride (NS) flush 5-40 mL  5-40 mL IntraVENous PRN Harshil Saucedo MD        heparin 25,000 units in D5W 250 ml infusion  10.3-25 Units/kg/hr IntraVENous TITRATE Harshil Saucedo MD 9 mL/hr at 02/05/19 1045 900 Units/hr at 02/05/19 1045    insulin lispro (HUMALOG) injection   SubCUTAneous AC&HS Corrie Ennis MD   Stopped at 02/05/19 1130    glucose chewable tablet 16 g  4 Tab Oral PRN Corrie Ennis MD        glucagon (GLUCAGEN) injection 1 mg  1 mg IntraMUSCular PRN Corrie Ennis MD        dextrose (D50W) injection syrg 12.5-25 g  25-50 mL IntraVENous PRN Corrie Ennis MD        sodium chloride (NS) flush 5-10 mL  5-10 mL IntraVENous PRN Clementina Ch DO        atorvastatin (LIPITOR) tablet 40 mg  40 mg Oral DAILY Jorge Luis Maldonado MD   40 mg at 02/05/19 8217    olmesartan (BENICAR) tablet 40 mg  40 mg Oral DAILY Jorge Luis Maldonado MD   40 mg at 02/05/19 6304    pantoprazole (PROTONIX) tablet 40 mg  40 mg Oral ACB Jorge Luis Maldonado MD   40 mg at 02/05/19 1697    oxyCODONE-acetaminophen (PERCOCET) 5-325 mg per tablet 1-2 Tab  1-2 Tab Oral Q6H PRN Teresa Welch MD   2 Tab at 02/04/19 1419    aspirin chewable tablet 81 mg  81 mg Oral DAILY Carolina Agudeloma   81 mg at 02/05/19 2978    sodium chloride (NS) flush 5-40 mL  5-40 mL IntraVENous Q8H JOSE Hernandez   10 mL at 02/05/19 1400    sodium chloride (NS) flush 5-40 mL  5-40 mL IntraVENous PRN JOSE Agudelo        magnesium hydroxide (MILK OF MAGNESIA) 400 mg/5 mL oral suspension 30 mL  30 mL Oral DAILY PRN JOSE Agudelo        docusate sodium (COLACE) capsule 100 mg  100 mg Oral BID JOSE Hernandez   100 mg at 02/04/19 0866    acetaminophen (TYLENOL) tablet 650 mg  650 mg Oral Q4H PRN Whitney Collazo MD   650 mg at 19 8818       Drug allergies: Allergies   Allergen Reactions    Augmentin [Amoxicillin-Pot Clavulanate] Other (comments)     Turns his tongue black       Family History: There is a history of heart disease in the family. Social History: Smoking history as above. He consumes alcohol rarely. He lives with a spouse and is a retired .     REVIEW OF SYSTEMS:   Constitutional:        negative for significant weight gain or loss recently       positive for fevers, chills    Integumentary:       negative for recent rashes or jaundice  Eyes:        negative for diplopia, transient visual loss  ENMT:        negative for hearing loss        negative for sinus congestion        negative for sore throat  Respiratory:        negative for chronic cough        positive for recent productive cough  Cardiovascular: as noted above in history   Gastrointestinal:        negative for abdominal pain        negative for diarrhea       negative for constipation  Genitourinary:         negative for dysuria  Musculoskeletal:        positive for chronic back pain        positive for joint pain (hands)        negative for recent fractures        negative for recent leg cramping  Hematologic / Lymphatic:        negative for easy bruisability        negative for clots in legs  Neurological:       negative for headaches        negative for seizures  Psychiatric:        negative for anxiety        negative for depression    PHYSICAL EXAMINATION:  Vital signs:   BP Readings from Last 3 Encounters:   19 (!) 149/93   18 150/90   11/12/15 132/74     Temp (24hrs), Av.6 °F (37 °C), Min:97.3 °F (36.3 °C), Max:99.5 °F (37.5 °C)    Wt Readings from Last 3 Encounters:   19 93.8 kg (206 lb 12.8 oz)   18 95.3 kg (210 lb)   11/25/15 85.7 kg (189 lb)     Ht Readings from Last 3 Encounters:   19 6' (1.829 m) 12/17/18 5' 11\" (1.803 m)   11/25/15 5' 11\" (1.803 m)     General appearance:  He appeared well-nourished and of moderate build. BMI was 28. Integument: The skin was warm and dry and had good turgor. Eyes: Conjunctivae were not injected. The sclerae were anicteric. ENMT:  Thyromegaly was absent. Oral mucosa were moist.    Respiratory: Respirations were not labored, and the lungs were clear to auscultation bilaterally, without rales, without wheezes but with a few scattered rhonchi in both bases. Cardiovascular: The rate and rhythm were regular, without an S3, without JVD, and without a murmur. The PMI was not displaced laterally. Carotid pulses were 1+ on the right and trace on the left without bruits, and radial pulses were 2+ bilaterally. Pedal pulses were present bilaterally. Varicose veins were absent in both legs. Pedal edema was absent. Gastrointestinal: The abdomen was globoid and was soft and was not tender, with good bowel sounds, and hepatomegaly was absent, and splenomegaly was absent. Pulsatile masses and bruits in the abdomen were absent. Genitourinary: CVA tenderness was absent. Musculoskeletal: The neck was supple with good range of motion. Vertebral tenderness was absent. He was not kyphotic. Heme/Lymph: Cervical and supraclavicular lymphadenopathy were absent. There were not lower extremity venous stasis changes. Neurologic: He was alert and oriented, and was a good historian. Strength and motion were normal and symmetrical in his extremities. Psychiatric: He was pleasant, calm, and had a normal affect.     LABORATORIES:   Lab Results   Component Value Date/Time    WBC 4.3 (L) 02/05/2019 09:49 AM    HCT 40.3 02/05/2019 09:49 AM     02/05/2019 09:49 AM      Lab Results   Component Value Date/Time     02/04/2019 02:15 AM    K 4.0 02/04/2019 02:15 AM     02/04/2019 02:15 AM    CO2 26 02/04/2019 02:15 AM     (H) 02/04/2019 02:15 AM    BUN 11 02/04/2019 02:15 AM    CREA 1.13 02/04/2019 02:15 AM    CREA 1.05 02/03/2019 03:57 PM    CREA 1.23 02/03/2019 01:42 AM     Albumin 4.2, total bilirubin 0.5, INR 1.3  Cholesterol 100, triglycerides 81, HDL 50, and LDL 33    Troponin: 9    A1c: 7    The chest x-ray image, which I have personally reviewed, demonstrates clear lungs bilaterally. The EKG print-out, which I have personally reviewed, shows sinus rhythm with a possible new IMI. The echocardiogram images revealed an ejection fraction of 60%. The PA systolic pressure was 29 mm Hg. There was no significant valvular disease. I have also personally reviewed the chest CT scan images, which revealed no significant ascending aortic calcifications. I have also personally reviewed the cardiac catheterization images. Coronary arteriography was noted to reveal the following atheromatous plaque stenoses in the native coronary arteries:   left main 60%  mid LAD 70%  mid RCA 50%  PDA 80% though very small. Impression:  Diagnoses:  1. Coronary artery disease (symptomatic, progressive, severe)  2. Essential hypertension  3. hypercholesterolemia  4. diabetes mellitus type-2    With 3-vessel CAD there would be potential benefit from surgical coronary revascularization utilizing a left internal mammary artery and saphenous vein grafts. I have discussed this operation in detail with him and his spouse, along with the alternatives, which would include medical therapy and / or PCI. I also outlined the risks and benefits of the surgery, which would include, but not be limited to, operative mortality, stroke, pneumonia, renal failure, infection, bleeding and need for re-exploration, perioperative myocardial infarction, postoperative arrhythmias, sternal dehiscence, hand dysesthesias and/or weakness, and blood component transfusions. Recommendations: Following our discussion, he has decided to think it over.   If he decides to proceed with surgery, I would schedule it during this admission though would give him a couple of days to completely clear his bronchitis (still with productive cough though less since on antibiotics). He will need the additional preoperative tests, which I will order:  Carotid duplex scanning given left main disease and discrepancy in carotid pulses    Thank you for involving me in his care. I spent over 80 minutes seeing and examining the patient and reviewing his pertinent studies and speaking with him and his family, and also with his physician.     Dena Lackey MD

## 2019-02-05 NOTE — PROGRESS NOTES
[facility] Hospitalist Group Progress Note Patient: Kayla Martinez. Age: 72 y.o. : 1945 MR#: [id number] SSN: [id number] Date/Time: 2019 Subjective:  
 
Review of systems No cp now no sob Cough cough with yellow sputum - less in severity C/o headache Had fever this AM  
 
Assessment/Plan: 1. Chest pain 2 Elevated troponin  
3 NSTEMI - by enzymes 4 Fever - likely bronchitis - so far negative cultures , on empiric Levaquin ( changed overnight from Zithromax ) PLAN  
- s/p Cath - multivessel disease - being evaluated for CABG  
- Change to po Levaquin for 2 days and stop antibiotics - No Positive cultures so far - Continue other management Case discussed with:  [x]Patient  [x] Family ( In room with patient )    []Nursing  []Case Management DVT Prophylaxis:  [x]Lovenox  []Hep SQ  []SCDs  []Coumadin   []On Heparin gtt Objective:  
VS:  
Visit Vitals BP (!) 160/93 Comment: RN notified Pulse 97 Temp 99.5 °F (37.5 °C) Resp 20 Ht 6' (1.829 m) Wt 93.8 kg (206 lb 12.8 oz) SpO2 94% BMI 28.05 kg/m² Tmax/24hrs: Temp (24hrs), Av °F (37.2 °C), Min:98.3 °F (36.8 °C), Max:99.7 °F (37.6 °C) IOBRIEF Intake/Output Summary (Last 24 hours) at 2019 0740 Last data filed at 2019 8418 Gross per 24 hour Intake 648.31 ml Output  Net 648.31 ml General:  Alert, cooperative, no acute distress Cardiovascular: S1S2 - regular , No Murmur Pulmonary: Equal expansion , No Use of accessory muscles , No Rales No Rhonchi GI:  +BS in all four quadrants, soft, non-tender Extremities:  No edema; 2+ dorsalis pedis pulses bilaterally Neuro: Alert and oriented X 2. Medications:  
Current Facility-Administered Medications Medication Dose Route Frequency  levoFLOXacin (LEVAQUIN) tablet 250 mg  250 mg Oral Q24H  
 metoprolol tartrate (LOPRESSOR) tablet 25 mg  25 mg Oral Q12H  sodium chloride (NS) flush 5-40 mL  5-40 mL IntraVENous Q8H  
 sodium chloride (NS) flush 5-40 mL  5-40 mL IntraVENous PRN  
 heparin 25,000 units in D5W 250 ml infusion  10.3-25 Units/kg/hr IntraVENous TITRATE  insulin lispro (HUMALOG) injection   SubCUTAneous AC&HS  
 glucose chewable tablet 16 g  4 Tab Oral PRN  
 glucagon (GLUCAGEN) injection 1 mg  1 mg IntraMUSCular PRN  
 dextrose (D50W) injection syrg 12.5-25 g  25-50 mL IntraVENous PRN  
 sodium chloride (NS) flush 5-10 mL  5-10 mL IntraVENous PRN  
 atorvastatin (LIPITOR) tablet 40 mg  40 mg Oral DAILY  olmesartan (BENICAR) tablet 40 mg  40 mg Oral DAILY  pantoprazole (PROTONIX) tablet 40 mg  40 mg Oral ACB  oxyCODONE-acetaminophen (PERCOCET) 5-325 mg per tablet 1-2 Tab  1-2 Tab Oral Q6H PRN  
 aspirin chewable tablet 81 mg  81 mg Oral DAILY  sodium chloride (NS) flush 5-40 mL  5-40 mL IntraVENous Q8H  
 sodium chloride (NS) flush 5-40 mL  5-40 mL IntraVENous PRN  
 magnesium hydroxide (MILK OF MAGNESIA) 400 mg/5 mL oral suspension 30 mL  30 mL Oral DAILY PRN  
 docusate sodium (COLACE) capsule 100 mg  100 mg Oral BID  acetaminophen (TYLENOL) tablet 650 mg  650 mg Oral Q4H PRN Labs:   
Recent Labs 02/03/19 
1557 02/03/19 
0142 WBC 6.2 8.7 HGB 12.8* 14.4 HCT 38.1 42.0  183 Recent Labs 02/04/19 
0215 02/03/19 
1557 02/03/19 
0142  138 138  
K 4.0 4.1 3.9  106 105 CO2 26 25 25 * 133* 218* BUN 11 10 17 CREA 1.13 1.05 1.23  
CA 8.9 8.2* 8.7 ALB 4.2 3.9 4.4 SGOT 89* 63* 24 ALT 54 47 52 INR  --  1.3*  --   
 
 
 
Signed By: Rigoberto Watson MD   
 February 5, 2019

## 2019-02-05 NOTE — PROGRESS NOTES
[facility] - Progress Note Admit Date: 2/3/2019 Assessment:  
 
Hospital Problems  Date Reviewed: 11/25/2015 Codes Class Noted POA Chest pain ICD-10-CM: R07.9 ICD-9-CM: 786.50  2/3/2019 Unknown -NSTEMI- with peak troponin at 9. -CAD, coronary angiography reveals small RPDA with 95% stenosis. Unfortunately, he has mid to distal left main 65% stenosis as well as mid LAD 80-85% stenosis. He has overall normal left ventricular systolic function. I feel that his best option is to consider open heart surgery with bypass to LAD, circumflex. Hopefully, if the RPDA can be bypass, that would be beneficial. 
-Normal EF 60% by echo this admission. 
-Acute bronchitis- on abx -HTN- elevated on examination but says he has been stable at home. 
-DM Type II on oral meds 
-Dyslipidemia- on statin 
  
-No primary cardiologist, Dr. Aruna Marie PMD 
 
 
Plan: Will appreciate CT surgery evaluation. Continued on heparin infusion. Currently continued on ASA, statin, lopressor (increased), benicar. Subjective: No new complaints. No Cp. No SOB. Objective:  
  
Patient Vitals for the past 8 hrs: 
 Temp Pulse Resp BP SpO2  
02/05/19 0806 97.3 °F (36.3 °C) 89 18 (!) 149/91   
02/05/19 0430 99.5 °F (37.5 °C) 97 20 (!) 160/93 94 % 02/05/19 0109 99.1 °F (37.3 °C) 72 20 (!) 152/91 94 % Patient Vitals for the past 96 hrs: 
 Weight  
02/05/19 0554 93.8 kg (206 lb 12.8 oz) 02/04/19 2100 96.6 kg (213 lb) 02/03/19 1058 97.5 kg (215 lb) 02/03/19 0131 97.5 kg (215 lb) Intake/Output Summary (Last 24 hours) at 2/5/2019 4604 Last data filed at 2/5/2019 0119 Gross per 24 hour Intake 648.31 ml Output  Net 648.31 ml Physical Exam: 
General:  alert, cooperative, no distress, appears stated age Neck:  no JVD Lungs:  clear to auscultation bilaterally Heart:  regular rate and rhythm Abdomen:  abdomen is soft without significant tenderness, masses, organomegaly or guarding Extremities:  extremities normal, atraumatic, no cyanosis or edema. Right radial site without hematoma with full peripheral pulses. Data Review:  
 
Labs: Results:  
   
Chemistry Recent Labs 02/04/19 0215 02/03/19 1557 02/03/19 
0142 * 133* 218*  138 138  
K 4.0 4.1 3.9  106 105 CO2 26 25 25 BUN 11 10 17 CREA 1.13 1.05 1.23  
CA 8.9 8.2* 8.7 AGAP 7 7 8 BUCR 10* 10* 14 AP 62 58 72  
TP 7.7 7.2 8.3* ALB 4.2 3.9 4.4 GLOB 3.5 3.3 3.9 AGRAT 1.2 1.2 1.1  
  
CBC w/Diff Recent Labs 02/03/19 1557 02/03/19 0142 WBC 6.2 8.7  
RBC 4.41* 4.88  
HGB 12.8* 14.4 HCT 38.1 42.0  183 GRANS  --  83* LYMPH  --  7* EOS  --  0 Cardiac Enzymes No results found for: CPK, CK, CKMMB, CKMB, RCK3, CKMBT, CKNDX, CKND1, CANDI, TROPT, TROIQ, GERMAINE, TROPT, TNIPOC, BNP, BNPP Coagulation Recent Labs 02/05/19 
0024 02/03/19 2145 02/03/19 
1557 PTP  --   --  15.8* INR  --   --  1.3* APTT 120.6* 133.7* >180.0* Lipid Panel Lab Results Component Value Date/Time Cholesterol, total 100 02/04/2019 02:15 AM  
 HDL Cholesterol 50 02/04/2019 02:15 AM  
 LDL, calculated 33.8 02/04/2019 02:15 AM  
 VLDL, calculated 16.2 02/04/2019 02:15 AM  
 Triglyceride 81 02/04/2019 02:15 AM  
 CHOL/HDL Ratio 2.0 02/04/2019 02:15 AM  
  
BNP No results found for: BNP, BNPP, XBNPT Liver Enzymes Recent Labs 02/04/19 0215 TP 7.7 ALB 4.2 AP 62 SGOT 89* Digoxin Thyroid Studies Lab Results Component Value Date/Time TSH 0.45 02/03/2019 03:57 PM  
    
 
Signed By: JOSE Salguero February 5, 2019

## 2019-02-05 NOTE — PROGRESS NOTES
Right wrist D-STAT band removed, no bleeding or swelling. Sterile dressing applied. Normal radial pulse, normal distal circulation. Safety splint applied. Safety instruction reviewed.

## 2019-02-05 NOTE — DIABETES MGMT
Glycemic Control Plan of Care 
 
T2DM with current A1c of 7.3% (02/03/2019). See separate notes, 02/05/2019, for assessment of home diabetes management and education. Home diabetes meds prior to admission: 
Actos 30 mg once daily. Glimepride 1 mg daily every morning Metformin 500 mg twice daily with meals. Bydureon 2 mg SQ every 7 days (Friday). POC BG range on 02/04/2019: 126-174 mg/dL. POC BG report on 02/05/2019 at time of review: 165, 145, 165 mg/dL. Patient anticip cardiac surgery this Friday, 02/08/2019. Informed/explained to patient about use of insulin and frequent fingerstick BG monitoring after surgery. He verbalized understanding. Recommendation(s): 
1.) Consider adding 5 units of basal lantus insulin daily. 2.) Modified cardiac diet by adding consistent carb 1800kcal. 
 
Assessment: 
Patient is 72year old with past medical history including type 2 diabetes mellitus, hypertension, and arthritis - was admitted on 02/03/2019 with report of chest pain. Noted: 
NSTEMI. 
CAD. Plan for  CABG this week. T2DM with current A1c of 7.3% (02/03/2019). Most recent blood glucose values: 
 
Results for Hermes Barroso (MRN [id number]) as of 2/5/2019 16:41 Ref. Range 2/4/2019 08:21 2/4/2019 12:01 GLUCOSE,FAST - POC Latest Ref Range: 70 - 110 mg/dL 174 (H) 126 (H) Results for Hermes Barroso (MRN [id number]) as of 2/5/2019 16:41 Ref. Range 2/5/2019 08:05 2/5/2019 12:08 2/5/2019 15:43 GLUCOSE,FAST - POC Latest Ref Range: 70 - 110 mg/dL 165 (H) 145 (H) 165 (H) Current A1C: 7.23% (2/03/2019) which is equivalent to estimated average blood glucose of 163 mg/dL during the past 2-3 months. Current hospital diabetes medications: 
Correctional lispro insulin ACHS. Normal sensitivity dose. Total daily dose insulin requirement previous day: 02/04/2019 Lispro: 4 units Home diabetes medications: Patient reported on 02/05/2019:  
Actos 30 mg once daily. Glimepride 1 mg daily every morning Metformin 500 mg twice daily with meals. Bydureon 2 mg SQ every 7 days (Friday). Diet: Cardiac regular; consistent carb 1800kcal. 
 
Goals:  Blood glucose will be within target range of  mg/dL by 02/08/2019. Education:  _X__  Refer to Diabetes Education Record: 02/05/2019 
           ___  Education not indicated at this time Jefe Duval RN [facility] Pager: [phone]

## 2019-02-06 ENCOUNTER — APPOINTMENT (OUTPATIENT)
Dept: VASCULAR SURGERY | Age: 74
DRG: 234 | End: 2019-02-06
Attending: PHYSICIAN ASSISTANT
Payer: MEDICARE

## 2019-02-06 PROBLEM — I21.4 NSTEMI (NON-ST ELEVATED MYOCARDIAL INFARCTION) (HCC): Status: ACTIVE | Noted: 2019-02-06

## 2019-02-06 LAB
APTT PPP: 81.1 SEC (ref 23–36.4)
GLUCOSE BLD STRIP.AUTO-MCNC: 138 MG/DL (ref 70–110)
GLUCOSE BLD STRIP.AUTO-MCNC: 154 MG/DL (ref 70–110)
GLUCOSE BLD STRIP.AUTO-MCNC: 193 MG/DL (ref 70–110)
GLUCOSE BLD STRIP.AUTO-MCNC: 224 MG/DL (ref 70–110)

## 2019-02-06 PROCEDURE — 74011250637 HC RX REV CODE- 250/637: Performed by: INTERNAL MEDICINE

## 2019-02-06 PROCEDURE — 82962 GLUCOSE BLOOD TEST: CPT

## 2019-02-06 PROCEDURE — 77010033678 HC OXYGEN DAILY

## 2019-02-06 PROCEDURE — 93880 EXTRACRANIAL BILAT STUDY: CPT

## 2019-02-06 PROCEDURE — 65660000004 HC RM CVT STEPDOWN

## 2019-02-06 PROCEDURE — 94762 N-INVAS EAR/PLS OXIMTRY CONT: CPT

## 2019-02-06 PROCEDURE — 74011636637 HC RX REV CODE- 636/637: Performed by: INTERNAL MEDICINE

## 2019-02-06 PROCEDURE — 85730 THROMBOPLASTIN TIME PARTIAL: CPT

## 2019-02-06 PROCEDURE — 36415 COLL VENOUS BLD VENIPUNCTURE: CPT

## 2019-02-06 PROCEDURE — 74011250637 HC RX REV CODE- 250/637: Performed by: PHYSICIAN ASSISTANT

## 2019-02-06 RX ORDER — INSULIN GLARGINE 100 [IU]/ML
5 INJECTION, SOLUTION SUBCUTANEOUS DAILY
Status: DISCONTINUED | OUTPATIENT
Start: 2019-02-06 | End: 2019-02-08

## 2019-02-06 RX ORDER — FLUTICASONE PROPIONATE 50 MCG
2 SPRAY, SUSPENSION (ML) NASAL 2 TIMES DAILY
Status: DISCONTINUED | OUTPATIENT
Start: 2019-02-06 | End: 2019-02-13 | Stop reason: HOSPADM

## 2019-02-06 RX ADMIN — DOCUSATE SODIUM 100 MG: 100 CAPSULE, LIQUID FILLED ORAL at 17:05

## 2019-02-06 RX ADMIN — INSULIN LISPRO 2 UNITS: 100 INJECTION, SOLUTION INTRAVENOUS; SUBCUTANEOUS at 12:11

## 2019-02-06 RX ADMIN — ATORVASTATIN CALCIUM 40 MG: 40 TABLET, FILM COATED ORAL at 09:17

## 2019-02-06 RX ADMIN — INSULIN LISPRO 2 UNITS: 100 INJECTION, SOLUTION INTRAVENOUS; SUBCUTANEOUS at 17:07

## 2019-02-06 RX ADMIN — LEVOFLOXACIN 250 MG: 250 TABLET, FILM COATED ORAL at 06:50

## 2019-02-06 RX ADMIN — METOPROLOL TARTRATE 50 MG: 50 TABLET ORAL at 09:17

## 2019-02-06 RX ADMIN — Medication 10 ML: at 06:55

## 2019-02-06 RX ADMIN — INSULIN LISPRO 4 UNITS: 100 INJECTION, SOLUTION INTRAVENOUS; SUBCUTANEOUS at 09:17

## 2019-02-06 RX ADMIN — METOPROLOL TARTRATE 50 MG: 50 TABLET ORAL at 20:48

## 2019-02-06 RX ADMIN — ASPIRIN 81 MG 81 MG: 81 TABLET ORAL at 09:17

## 2019-02-06 RX ADMIN — ACETAMINOPHEN 650 MG: 325 TABLET ORAL at 10:30

## 2019-02-06 RX ADMIN — OLMESARTAN MEDOXOMIL 40 MG: 40 TABLET, COATED ORAL at 09:17

## 2019-02-06 RX ADMIN — DOCUSATE SODIUM 100 MG: 100 CAPSULE, LIQUID FILLED ORAL at 09:17

## 2019-02-06 RX ADMIN — INSULIN GLARGINE 5 UNITS: 100 INJECTION, SOLUTION SUBCUTANEOUS at 17:03

## 2019-02-06 RX ADMIN — Medication 10 ML: at 13:58

## 2019-02-06 RX ADMIN — PANTOPRAZOLE SODIUM 40 MG: 40 TABLET, DELAYED RELEASE ORAL at 06:50

## 2019-02-06 RX ADMIN — ACETAMINOPHEN 650 MG: 325 TABLET ORAL at 04:54

## 2019-02-06 RX ADMIN — FLUTICASONE PROPIONATE 2 SPRAY: 50 SPRAY, METERED NASAL at 17:04

## 2019-02-06 NOTE — PROGRESS NOTES
Hospitalist Progress Note NAME:  Camille Clark :   1945 MRN:   [id number] Date/Time:  2019 Subjective: Chief Complaint:   
Pt has no chest pain no sob. Review of Systems: Y  N       Y  N 
[] [x]  Fever/chills                                               [] [x]  Chest Pain 
[] [x]  Cough                                                       [] [x]  Diarrhea  
[] [x]  Sputum                                                     [] [x]  Constipation 
[] [x]  SOB/SIMONS                                                [] [x]  Nausea/Vomit 
[] [x]  Abd Pain                                                    [] []  Tolerating PT 
[] [x]  Dysuria                                                      [] []  Tolerating Diet 
 
 []Unable to obtain  ROS due to  []mental status change  []sedated   []intubated Past Med History and Social history reviewed. No changes. [x]Current Medication list and allergies reviewed* Objective:  
Vitals: 
Visit Vitals /72 (BP 1 Location: Right arm, BP Patient Position: At rest) Pulse 61 Temp 98.2 °F (36.8 °C) Resp 19 Ht 6' (1.829 m) Wt 94.7 kg (208 lb 12.8 oz) SpO2 96% BMI 28.32 kg/m² Temp (24hrs), Av.6 °F (37 °C), Min:98.2 °F (36.8 °C), Max:99 °F (37.2 °C) Last 24hr Input/Output: 
 
Intake/Output Summary (Last 24 hours) at 2019 1605 Last data filed at 2019 1346 Gross per 24 hour Intake 1668.5 ml Output 200 ml Net 1468.5 ml PHYSICAL EXAM: 
General:    Alert, cooperative, no distress, appears stated age. HEENT: Atraumatic, anicteric sclerae, pink conjunctivae No oral ulcers, mucosa moist, throat clear Neck:  Supple, symmetrical,  thyroid: non tender Back:    No CVA tenderness. Lungs:   Clear to auscultation bilaterally. No Wheezing or Rhonchi. No rales. Chest wall:  No tenderness  No Accessory muscle use. Heart:   Regular  rhythm,  No  murmur   No gallop. No edema Abdomen:   Soft, non-tender. Not distended. Bowel sounds normal. No masses Extremities: No cyanosis. No clubbing Psych:  Good insight. Not depressed. Not anxious or agitated. Neurologic: EOMs intact. No facial asymmetry. No aphasia or slurred speech. Normal   strength, Alert and oriented X 3. []Telemetry Reviewed     []NSR []PAC/PVCs   []Afib  []Paced   []NSVT []Zaidi []NGT  []Intubated on vent Lab Data Reviewed: No components found for: [place] Recent Labs 02/05/19 
1619 02/05/19 
1767 02/04/19 
0215   --  140  
K 4.0  --  4.0  
  --  107 CO2 29  --  26 BUN 17  --  11  
CREA 1.45*  --  1.13  
*  --  109* CA 8.6  --  8.9 ALB  --   --  4.2 WBC  --  4.3*  --   
HGB  --  14.1  --   
HCT  --  40.3  --   
PLT  --  155  --   
 
 
 []  I have personally reviewed the   []xray  []CT scan Assessment/Plan: 1. Chest pain 2 Elevated troponin  
3 NSTEMI - by enzymes 4 Fever - likely bronchitis - so far negative cultures , on empiric Levaquin ( changed overnight from Zithromax )   
  
PLAN  
- s/p Cath - multivessel disease - being evaluated for CABG  
- Change to po Levaquin for 2 days and stop antibiotics - No Positive cultures so far - Continue asa, statin, lopressor, Benicar. DM 2; start lantus. 
 
 
 
 
 
___________________________________________________ Total time spent with patient:     minutes []Critical Care time:    minutes Care Plan discussed with: 
  [x]Patient   []Family    []Care Manager    []Nursing   []Consultant/Specialist : 
 
  []  >50% of visit spent in counseling and coordination of care (Discussed []CODE status,  []Care Plan, []D/C Planning) Prophylaxis:  []Lovenox  []Coumadin  []Hep SQ  []SCDs  []H2B/PPI  Heparin drip. Disposition:  []Home w/ Family   []HH PT,OT,RN   []SNF/LTC   []SAH/Rehab 
___________________________________________________ Hospitalist: Tran Dockery MD  
 
 ___________________________________________________ *Medications reviewed: 
Current Facility-Administered Medications Medication Dose Route Frequency  fluticasone (FLONASE) 50 mcg/actuation nasal spray 2 Spray  2 Spray Both Nostrils BID  insulin glargine (LANTUS) injection 5 Units  5 Units SubCUTAneous DAILY  levoFLOXacin (LEVAQUIN) tablet 250 mg  250 mg Oral Q24H  
 metoprolol tartrate (LOPRESSOR) tablet 50 mg  50 mg Oral Q12H  
 sodium chloride (NS) flush 5-40 mL  5-40 mL IntraVENous Q8H  
 sodium chloride (NS) flush 5-40 mL  5-40 mL IntraVENous PRN  
 heparin 25,000 units in D5W 250 ml infusion  10.3-25 Units/kg/hr IntraVENous TITRATE  insulin lispro (HUMALOG) injection   SubCUTAneous AC&HS  
 glucose chewable tablet 16 g  4 Tab Oral PRN  
 glucagon (GLUCAGEN) injection 1 mg  1 mg IntraMUSCular PRN  
 dextrose (D50W) injection syrg 12.5-25 g  25-50 mL IntraVENous PRN  
 sodium chloride (NS) flush 5-10 mL  5-10 mL IntraVENous PRN  
 atorvastatin (LIPITOR) tablet 40 mg  40 mg Oral DAILY  olmesartan (BENICAR) tablet 40 mg  40 mg Oral DAILY  pantoprazole (PROTONIX) tablet 40 mg  40 mg Oral ACB  oxyCODONE-acetaminophen (PERCOCET) 5-325 mg per tablet 1-2 Tab  1-2 Tab Oral Q6H PRN  
 aspirin chewable tablet 81 mg  81 mg Oral DAILY  sodium chloride (NS) flush 5-40 mL  5-40 mL IntraVENous Q8H  
 sodium chloride (NS) flush 5-40 mL  5-40 mL IntraVENous PRN  
 magnesium hydroxide (MILK OF MAGNESIA) 400 mg/5 mL oral suspension 30 mL  30 mL Oral DAILY PRN  
 docusate sodium (COLACE) capsule 100 mg  100 mg Oral BID  acetaminophen (TYLENOL) tablet 650 mg  650 mg Oral Q4H PRN

## 2019-02-06 NOTE — DIABETES MGMT
Glycemic Control Plan of Care 
 
T2DM with current A1c of 7.3% (02/03/2019). See separate notes, 02/05/2019, for assessment of home diabetes management and education. Home diabetes meds prior to admission: 
Actos 30 mg once daily. Glimepride 1 mg daily every morning Metformin 500 mg twice daily with meals. Bydureon 2 mg SQ every 7 days (Friday). POC BG range on 02/05/2019: 145-165 mg/dL. POC BG report on 02/06/2019 at time of review: 224, 193 mg/dL. Anticip cardiac surgery this Friday, 2/08/2019. Patient reported that he is eating meals without any problem. Current Meal Intake: recorded. Patient Vitals for the past 100 hrs: 
 % Diet Eaten 02/05/19 1802 100 % 02/05/19 1247 100 % 02/05/19 0851 75 % 02/04/19 1259 0 % Recommendation(s): 
1.) Consider adding 5 units of basal lantus insulin daily. Called Dr. Penny Curiel and obtained order. 2.) Modified cardiac diabetic diet by adding no concentrated sweets. Assessment: 
Patient is 72year old with past medical history including type 2 diabetes mellitus, hypertension, and arthritis - was admitted on 02/03/2019 with report of chest pain. Noted: 
NSTEMI. 
CAD. Plan for  CABG this week. T2DM with current A1c of 7.3% (02/03/2019). Most recent blood glucose values: 
 
Results for Emeka Chery (MRN [id number]) as of 2/6/2019 15:23 Ref. Range 2/5/2019 08:05 2/5/2019 12:08 2/5/2019 15:43 2/5/2019 21:19  
GLUCOSE,FAST - POC Latest Ref Range: 70 - 110 mg/dL 165 (H) 145 (H) 165 (H) 156 (H) Results for Emeka Chery (MRN [id number]) as of 2/6/2019 15:23 Ref. Range 2/6/2019 08:21 2/6/2019 11:38  
GLUCOSE,FAST - POC Latest Ref Range: 70 - 110 mg/dL 224 (H) 193 (H) Current A1C: 7.23% (2/03/2019) which is equivalent to estimated average blood glucose of 163 mg/dL during the past 2-3 months. Current hospital diabetes medications: 
Basal lantus insulin  5 units daily, first dose ordered 02/06/2019. Correctional lispro insulin ACHS. Normal sensitivity dose. Total daily dose insulin requirement previous day: 02/05/2019 Lispro: 6 units Home diabetes medications: Patient reported on 02/05/2019:  
Actos 30 mg once daily. Glimepride 1 mg daily every morning Metformin 500 mg twice daily with meals. Bydureon 2 mg SQ every 7 days (Friday). Diet: Cardiac regular; consistent carb 1800kcal; no concentrated sweets. Goals:  Blood glucose will be within target range of  mg/dL by 02/09/2019. Education:  _X__  Refer to Diabetes Education Record: 02/05/2019 
           ___  Education not indicated at this time Loras Leyden, RN [facility] Pager: [phone]

## 2019-02-06 NOTE — PROGRESS NOTES
[facility] - Progress Note Admit Date: 2/3/2019 Assessment:  
 
Hospital Problems  Date Reviewed: 11/25/2015 Codes Class Noted POA Chest pain ICD-10-CM: R07.9 ICD-9-CM: 786.50  2/3/2019 Unknown -NSTEMI- with peak troponin at 9. -CAD, coronary angiography reveals small RPDA with 95% stenosis.  Unfortunately, he has mid to distal left main 65% stenosis as well as mid LAD 80-85% stenosis.  He has overall normal left ventricular systolic function.  I feel that his best option is to consider open heart surgery with bypass to LAD, circumflex.  Hopefully, if the RPDA can be bypass, that would be beneficial. 
-Normal EF 60% by echo this admission. 
-Acute bronchitis- on abx -HTN- elevated on examination but says he has been stable at home. 
-DM Type II on oral meds 
-Dyslipidemia- on statin 
  
-No primary cardiologist, Dr. Isaac Segundo PMD 
 
 
Plan:  
 
Appreciate CT surgery evaluation. Plan for possible CABG later this week once bronchitis stable. Breathing much improved, No CP. Continued on heparin infusion. Currently continued on ASA, statin, BB, Benicar Subjective: No CP. Breathing overall stable. Objective:  
  
Patient Vitals for the past 8 hrs: 
 Temp Pulse Resp BP SpO2  
02/06/19 0702 98.4 °F (36.9 °C) 76 18 144/87 94 % 02/06/19 0434 98.8 °F (37.1 °C) 72 17 142/87 95 % Patient Vitals for the past 96 hrs: 
 Weight  
02/06/19 0614 94.7 kg (208 lb 12.8 oz) 02/05/19 0554 93.8 kg (206 lb 12.8 oz) 02/04/19 2100 96.6 kg (213 lb) 02/03/19 1058 97.5 kg (215 lb) 02/03/19 0131 97.5 kg (215 lb) Intake/Output Summary (Last 24 hours) at 2/6/2019 1020 Last data filed at 2/6/2019 8072 Gross per 24 hour Intake 1160 ml Output 600 ml Net 560 ml Physical Exam: 
General:  alert, cooperative, no distress, appears stated age Neck:  no JVD Lungs:  clear to auscultation bilaterally Heart:  regular rate and rhythm Abdomen:  abdomen is soft without significant tenderness, masses, organomegaly or guarding Extremities:  extremities normal, atraumatic, no cyanosis or edema Data Review:  
 
Labs: Results:  
   
Chemistry Recent Labs 02/05/19 
1619 02/04/19 
0215 02/03/19 
1557 * 109* 133*  140 138  
K 4.0 4.0 4.1  107 106 CO2 29 26 25 BUN 17 11 10 CREA 1.45* 1.13 1.05  
CA 8.6 8.9 8.2* AGAP 5 7 7 BUCR 12 10* 10* AP  --  62 58 TP  --  7.7 7.2 ALB  --  4.2 3.9 GLOB  --  3.5 3.3 AGRAT  --  1.2 1.2  
  
CBC w/Diff Recent Labs 02/05/19 
5762 02/03/19 
1557 WBC 4.3* 6.2  
RBC 4.67* 4.41* HGB 14.1 12.8* HCT 40.3 38.1  178 GRANS 65  --   
LYMPH 23  --   
EOS 1  --   
  
Cardiac Enzymes No results found for: CPK, CK, CKMMB, CKMB, RCK3, CKMBT, CKNDX, CKND1, CANDI, TROPT, TROIQ, GERMAINE, TROPT, TNIPOC, BNP, BNPP Coagulation Recent Labs 02/06/19 
0311 02/05/19 
8656  02/03/19 
1557 PTP  --   --   --  15.8* INR  --   --   --  1.3* APTT 81.1* 86.4*   < > >180.0*  
 < > = values in this interval not displayed. Lipid Panel Lab Results Component Value Date/Time Cholesterol, total 100 02/04/2019 02:15 AM  
 HDL Cholesterol 50 02/04/2019 02:15 AM  
 LDL, calculated 33.8 02/04/2019 02:15 AM  
 VLDL, calculated 16.2 02/04/2019 02:15 AM  
 Triglyceride 81 02/04/2019 02:15 AM  
 CHOL/HDL Ratio 2.0 02/04/2019 02:15 AM  
  
BNP No results found for: BNP, BNPP, XBNPT Liver Enzymes Recent Labs 02/04/19 
0215 TP 7.7 ALB 4.2 AP 62 SGOT 89* Digoxin Thyroid Studies Lab Results Component Value Date/Time TSH 0.45 02/03/2019 03:57 PM  
    
 
Signed By: JOSE Avendaño February 6, 2019

## 2019-02-06 NOTE — PROGRESS NOTES
[address] ATTENDING STAFF NOTE Patient resting comfortably in a chair. No significant events in past 24 hours. Denies chest pain. Stable VS.  
Lungs clear to auscultation bilaterally. Heart with RRR. Oxygen saturation of 95% on room air. He has agreed to proceed with CABG on Friday. Will obtain carotid duplex scan beforehand.  
 
Wendy Argueta MD

## 2019-02-07 ENCOUNTER — ANESTHESIA EVENT (OUTPATIENT)
Dept: CARDIOTHORACIC SURGERY | Age: 74
DRG: 234 | End: 2019-02-07
Payer: MEDICARE

## 2019-02-07 LAB
ANION GAP SERPL CALC-SCNC: 7 MMOL/L (ref 3–18)
BUN SERPL-MCNC: 23 MG/DL (ref 7–18)
BUN/CREAT SERPL: 15 (ref 12–20)
CALCIUM SERPL-MCNC: 8.4 MG/DL (ref 8.5–10.1)
CHLORIDE SERPL-SCNC: 103 MMOL/L (ref 100–108)
CO2 SERPL-SCNC: 27 MMOL/L (ref 21–32)
CREAT SERPL-MCNC: 1.49 MG/DL (ref 0.6–1.3)
GLUCOSE BLD STRIP.AUTO-MCNC: 112 MG/DL (ref 70–110)
GLUCOSE BLD STRIP.AUTO-MCNC: 131 MG/DL (ref 70–110)
GLUCOSE BLD STRIP.AUTO-MCNC: 168 MG/DL (ref 70–110)
GLUCOSE BLD STRIP.AUTO-MCNC: 201 MG/DL (ref 70–110)
GLUCOSE SERPL-MCNC: 218 MG/DL (ref 74–99)
LEFT CCA DIST DIAS: 15.6 CM/S
LEFT CCA DIST SYS: 147.6 CM/S
LEFT CCA MID DIAS: 11.63 CM/S
LEFT CCA MID SYS: 123.97 CM/S
LEFT CCA PROX DIAS: 12.4 CM/S
LEFT CCA PROX SYS: 98.5 CM/S
LEFT ECA DIAS: 7.69 CM/S
LEFT ECA SYS: 102.3 CM/S
LEFT ICA DIST DIAS: 15.5 CM/S
LEFT ICA DIST SYS: 51.9 CM/S
LEFT ICA MID DIAS: 19.1 CM/S
LEFT ICA MID SYS: 64.5 CM/S
LEFT ICA PROX DIAS: 10.6 CM/S
LEFT ICA PROX SYS: 45.5 CM/S
LEFT ICA/CCA SYS: 0.44
LEFT SUBCLAVIAN SYS: 164.1 CM/S
LEFT VERTEBRAL DIAS: 9.89 CM/S
LEFT VERTEBRAL SYS: 51.2 CM/S
POTASSIUM SERPL-SCNC: 3.8 MMOL/L (ref 3.5–5.5)
RIGHT CCA DIST DIAS: 15.6 CM/S
RIGHT CCA DIST SYS: 94.4 CM/S
RIGHT CCA MID DIAS: 21.48 CM/S
RIGHT CCA MID SYS: 135.8 CM/S
RIGHT CCA PROX DIAS: 16.7 CM/S
RIGHT CCA PROX SYS: 93.1 CM/S
RIGHT ECA DIAS: 7.69 CM/S
RIGHT ECA SYS: 90.5 CM/S
RIGHT ICA DIST DIAS: 18.4 CM/S
RIGHT ICA DIST SYS: 55.4 CM/S
RIGHT ICA MID DIAS: 16.5 CM/S
RIGHT ICA MID SYS: 68.8 CM/S
RIGHT ICA PROX DIAS: 9.3 CM/S
RIGHT ICA PROX SYS: 44.4 CM/S
RIGHT ICA/CCA SYS: 0.7
RIGHT SUBCLAVIAN SYS: 133.8 CM/S
RIGHT VERTEBRAL DIAS: 8.81 CM/S
RIGHT VERTEBRAL SYS: 39.6 CM/S
SODIUM SERPL-SCNC: 137 MMOL/L (ref 136–145)

## 2019-02-07 PROCEDURE — 82962 GLUCOSE BLOOD TEST: CPT

## 2019-02-07 PROCEDURE — 74011636637 HC RX REV CODE- 636/637: Performed by: INTERNAL MEDICINE

## 2019-02-07 PROCEDURE — 65660000004 HC RM CVT STEPDOWN

## 2019-02-07 PROCEDURE — 74011250637 HC RX REV CODE- 250/637: Performed by: INTERNAL MEDICINE

## 2019-02-07 PROCEDURE — 86900 BLOOD TYPING SEROLOGIC ABO: CPT

## 2019-02-07 PROCEDURE — 74011250637 HC RX REV CODE- 250/637: Performed by: PHYSICIAN ASSISTANT

## 2019-02-07 PROCEDURE — 36415 COLL VENOUS BLD VENIPUNCTURE: CPT

## 2019-02-07 PROCEDURE — 86923 COMPATIBILITY TEST ELECTRIC: CPT

## 2019-02-07 PROCEDURE — 80048 BASIC METABOLIC PNL TOTAL CA: CPT

## 2019-02-07 RX ORDER — SODIUM CHLORIDE 0.9 % (FLUSH) 0.9 %
5-40 SYRINGE (ML) INJECTION EVERY 8 HOURS
Status: DISCONTINUED | OUTPATIENT
Start: 2019-02-07 | End: 2019-02-08

## 2019-02-07 RX ORDER — AMIODARONE HYDROCHLORIDE 200 MG/1
1200 TABLET ORAL ONCE
Status: COMPLETED | OUTPATIENT
Start: 2019-02-07 | End: 2019-02-07

## 2019-02-07 RX ORDER — CEFAZOLIN SODIUM 2 G/50ML
2 SOLUTION INTRAVENOUS
Status: DISCONTINUED | OUTPATIENT
Start: 2019-02-08 | End: 2019-02-08

## 2019-02-07 RX ORDER — SODIUM CHLORIDE 0.9 % (FLUSH) 0.9 %
5-40 SYRINGE (ML) INJECTION AS NEEDED
Status: DISCONTINUED | OUTPATIENT
Start: 2019-02-07 | End: 2019-02-08

## 2019-02-07 RX ORDER — SODIUM CHLORIDE 9 MG/ML
500 INJECTION, SOLUTION INTRAVENOUS CONTINUOUS
Status: DISCONTINUED | OUTPATIENT
Start: 2019-02-08 | End: 2019-02-08

## 2019-02-07 RX ORDER — MUPIROCIN 20 MG/G
OINTMENT TOPICAL 2 TIMES DAILY
Status: DISCONTINUED | OUTPATIENT
Start: 2019-02-07 | End: 2019-02-08 | Stop reason: SDUPTHER

## 2019-02-07 RX ADMIN — ASPIRIN 81 MG 81 MG: 81 TABLET ORAL at 09:11

## 2019-02-07 RX ADMIN — FLUTICASONE PROPIONATE 2 SPRAY: 50 SPRAY, METERED NASAL at 09:32

## 2019-02-07 RX ADMIN — Medication 10 ML: at 06:00

## 2019-02-07 RX ADMIN — DOCUSATE SODIUM 100 MG: 100 CAPSULE, LIQUID FILLED ORAL at 17:13

## 2019-02-07 RX ADMIN — Medication 10 ML: at 14:00

## 2019-02-07 RX ADMIN — PANTOPRAZOLE SODIUM 40 MG: 40 TABLET, DELAYED RELEASE ORAL at 09:11

## 2019-02-07 RX ADMIN — LEVOFLOXACIN 250 MG: 250 TABLET, FILM COATED ORAL at 06:49

## 2019-02-07 RX ADMIN — AMIODARONE HYDROCHLORIDE 1200 MG: 200 TABLET ORAL at 12:23

## 2019-02-07 RX ADMIN — Medication 10 ML: at 22:12

## 2019-02-07 RX ADMIN — INSULIN GLARGINE 5 UNITS: 100 INJECTION, SOLUTION SUBCUTANEOUS at 09:13

## 2019-02-07 RX ADMIN — ATORVASTATIN CALCIUM 40 MG: 40 TABLET, FILM COATED ORAL at 09:11

## 2019-02-07 RX ADMIN — INSULIN LISPRO 2 UNITS: 100 INJECTION, SOLUTION INTRAVENOUS; SUBCUTANEOUS at 07:30

## 2019-02-07 RX ADMIN — METOPROLOL TARTRATE 50 MG: 50 TABLET ORAL at 20:58

## 2019-02-07 RX ADMIN — METOPROLOL TARTRATE 50 MG: 50 TABLET ORAL at 09:12

## 2019-02-07 RX ADMIN — INSULIN LISPRO 4 UNITS: 100 INJECTION, SOLUTION INTRAVENOUS; SUBCUTANEOUS at 12:22

## 2019-02-07 RX ADMIN — FLUTICASONE PROPIONATE 2 SPRAY: 50 SPRAY, METERED NASAL at 22:12

## 2019-02-07 RX ADMIN — MUPIROCIN: 20 OINTMENT TOPICAL at 22:12

## 2019-02-07 RX ADMIN — MUPIROCIN: 20 OINTMENT TOPICAL at 12:22

## 2019-02-07 RX ADMIN — DOCUSATE SODIUM 100 MG: 100 CAPSULE, LIQUID FILLED ORAL at 09:11

## 2019-02-07 RX ADMIN — OLMESARTAN MEDOXOMIL 40 MG: 40 TABLET, COATED ORAL at 09:11

## 2019-02-07 NOTE — PROGRESS NOTES
Met with pt and spouse. Pending surgery tomorrow. [facility] for [facility] obtained. Following for transition planning. Elsa Paige, [phone]

## 2019-02-07 NOTE — PROGRESS NOTES
Hospitalist Progress Note NAME:  Odilia Montanez. :   1945 MRN:   [id number] Date/Time:  2019 Subjective: Chief Complaint:   
Pt has no chest pain no sob. Review of Systems: Y  N       Y  N 
[] [x]  Fever/chills                                               [] [x]  Chest Pain 
[] [x]  Cough                                                       [] [x]  Diarrhea  
[] [x]  Sputum                                                     [] [x]  Constipation 
[] [x]  SOB/SIMONS                                                [] [x]  Nausea/Vomit 
[] [x]  Abd Pain                                                    [] []  Tolerating PT 
[] [x]  Dysuria                                                      [] []  Tolerating Diet 
 
 []Unable to obtain  ROS due to  []mental status change  []sedated   []intubated Past Med History and Social history reviewed. No changes. [x]Current Medication list and allergies reviewed* Objective:  
Vitals: 
Visit Vitals /79 Pulse 70 Temp 98.4 °F (36.9 °C) Resp 20 Ht 6' (1.829 m) Wt 94.7 kg (208 lb 12.8 oz) SpO2 95% BMI 28.32 kg/m² Temp (24hrs), Av.4 °F (36.9 °C), Min:98.1 °F (36.7 °C), Max:98.8 °F (37.1 °C) Last 24hr Input/Output: 
 
Intake/Output Summary (Last 24 hours) at 2019 1355 Last data filed at 2019 1247 Gross per 24 hour Intake 1367.4 ml Output 200 ml Net 1167.4 ml PHYSICAL EXAM: 
General:    Alert, cooperative, no distress, appears stated age. HEENT: Atraumatic, anicteric sclerae, pink conjunctivae No oral ulcers, mucosa moist, throat clear Lungs:   Clear to auscultation bilaterally. No Wheezing or Rhonchi. No rales. Chest wall:  No tenderness  No Accessory muscle use. Heart:   Regular  rhythm,  No  murmur   No gallop. No edema Abdomen:   Soft, non-tender. Not distended. Bowel sounds normal. No masses Extremities: No cyanosis. No clubbing Neurologic: EOMs intact. No facial asymmetry. No aphasia or slurred speech. Normal   strength, Alert and oriented X 3. []Telemetry Reviewed     []NSR []PAC/PVCs   []Afib  []Paced   []NSVT []Zaidi []NGT  []Intubated on vent Lab Data Reviewed: No components found for: [place] Recent Labs 02/05/19 
1619 02/05/19 
3963   --   
K 4.0  --   
  --   
CO2 29  --   
BUN 17  --   
CREA 1.45*  --   
*  --   
CA 8.6  --   
WBC  --  4.3* HGB  --  14.1 HCT  --  40.3 PLT  --  155  
 
 
 []  I have personally reviewed the   []xray  []CT scan Assessment/Plan: 1. Chest pain 2 Elevated troponin  
3 NSTEMI - by enzymes 4 Fever - likely bronchitis - so far negative cultures , on empiric Levaquin ( changed overnight from Zithromax )   
  
PLAN  
- s/p Cath - multivessel disease - / awaits CABG in the am. 
- s/p Levaquin now off it. 
- Continue asa, statin, lopressor, Benicar. DM 2; continue  lantus. 
 
 
 
 
 
___________________________________________________ Total time spent with patient:     minutes []Critical Care time:    minutes Care Plan discussed with: 
  [x]Patient   []Family    []Care Manager    []Nursing   []Consultant/Specialist : 
 
  []  >50% of visit spent in counseling and coordination of care (Discussed []CODE status,  []Care Plan, []D/C Planning) Prophylaxis:  []Lovenox  []Coumadin  []Hep SQ  []SCDs  []H2B/PPI  Heparin drip. Disposition:  []Home w/ Family   []HH PT,OT,RN   []SNF/LTC   []SAH/Rehab 
___________________________________________________ Hospitalist: Vivi Hirsch MD  
 
___________________________________________________ *Medications reviewed: 
Current Facility-Administered Medications Medication Dose Route Frequency  sodium chloride (NS) flush 5-40 mL  5-40 mL IntraVENous Q8H  
 sodium chloride (NS) flush 5-40 mL  5-40 mL IntraVENous PRN  
 mupirocin (BACTROBAN) 2 % ointment   Both Nostrils BID  
  [START ON 2/8/2019] ceFAZolin (ANCEF) 2g IVPB in 50 mL D5W  2 g IntraVENous ON CALL TO OR  
 [START ON 2/8/2019] 0.9% sodium chloride infusion 500 mL  500 mL IntraVENous CONTINUOUS  
 fluticasone (FLONASE) 50 mcg/actuation nasal spray 2 Spray  2 Spray Both Nostrils BID  insulin glargine (LANTUS) injection 5 Units  5 Units SubCUTAneous DAILY  metoprolol tartrate (LOPRESSOR) tablet 50 mg  50 mg Oral Q12H  
 heparin 25,000 units in D5W 250 ml infusion  10.3-25 Units/kg/hr IntraVENous TITRATE  insulin lispro (HUMALOG) injection   SubCUTAneous AC&HS  
 glucose chewable tablet 16 g  4 Tab Oral PRN  
 glucagon (GLUCAGEN) injection 1 mg  1 mg IntraMUSCular PRN  
 dextrose (D50W) injection syrg 12.5-25 g  25-50 mL IntraVENous PRN  
 atorvastatin (LIPITOR) tablet 40 mg  40 mg Oral DAILY  olmesartan (BENICAR) tablet 40 mg  40 mg Oral DAILY  pantoprazole (PROTONIX) tablet 40 mg  40 mg Oral ACB  oxyCODONE-acetaminophen (PERCOCET) 5-325 mg per tablet 1-2 Tab  1-2 Tab Oral Q6H PRN  
 aspirin chewable tablet 81 mg  81 mg Oral DAILY  sodium chloride (NS) flush 5-40 mL  5-40 mL IntraVENous Q8H  
 sodium chloride (NS) flush 5-40 mL  5-40 mL IntraVENous PRN  
 magnesium hydroxide (MILK OF MAGNESIA) 400 mg/5 mL oral suspension 30 mL  30 mL Oral DAILY PRN  
 docusate sodium (COLACE) capsule 100 mg  100 mg Oral BID  acetaminophen (TYLENOL) tablet 650 mg  650 mg Oral Q4H PRN

## 2019-02-07 NOTE — PROGRESS NOTES
1935 bedside turnover given to me by RN Claudia Vazquez. Pt is on the cardiac telemetry monitor in stable condition, all vitals are within normal limits. He was given ice water and a ginger ale. Pt denies chest pain and denies shortness of breath. Bed is in low position with wheels locked and call bell is on the bedside table within reach. 2015 pt walking in the hallway, denies pain and denies needs 2120 assessed pt, physical assessment and discussed his wife having the flu and his upcoming procedure on Friday. Asked him if he would like to watch the movies tonight on open heart surgery or if there was family that he would like to watch the movies with him. Pt said he would prefer to watch them with his family during the day tomorrow. 2200 meds given with a cup of ice water 2300 gave patient an incentive spirometer and showed him how to use it. Educated him on the use of it and how it will assist with prevention of respiratory issues after his procedure on Friday. Informed him to try to use it whenever commercials come on the tv. Educated on heparin with the risk of excessive bleeding, not to use a regular razor and to be careful when ambulating, if he feels weakness of light headed to remain seated and notify staff. 0020 hrly round pt is in bed sleeping on cardiac telemetry monitor, no signs of distress, all vitals are wnl 
0130 hrly round in bed sleeping on monitor with call bell at bedside 
0220 in bed sleeping, added more volume to the pump for his heparin infusion and checked on him, hrly rounded no signs of distress still on cardiac monitor with call bell on the bedside table within reach/ 
0315 
0400 
0530 
0620 
0730 bedside turnover given to RN __ SBAR< MAR, Ed summary given and a chance to ask questions. Pt is on the cardiac telemetry monitor in stable condition, denies chest pain and denies shortness of breath. Bed is in the lowest position with wheels locked and call bell within reach.

## 2019-02-07 NOTE — PROGRESS NOTES
[address] ATTENDING STAFF NOTE Patient resting comfortably in bed. No significant events in past 24 hours. Denies chest pain or SOB. Has been up and ambulating well. Stable VS. Oxygen saturation of 97% on room air. Carotid duplex scan notes no VIVEK stenosis, no comment on left. Ready for CABG in AM tomorrow. He did mention that his wife has come down with the flu, but he did have the flu shot, so we will watch for any flu-like symptoms over the next 24 hours and will postpone surgery if any appear. He understands and agrees.  
 
Barnabas Homans, MD

## 2019-02-07 NOTE — PROGRESS NOTES
1230 Noted pt scheduled amiodarone dosage is 1200 mg/6 tablets. Spoke with Bren GARCIA and verified dosage twice states it is ok to give it as a prophylaxis for tomorrow's surgery. Pt HR 70's NSR,  /79. Will cont to monitor pt

## 2019-02-07 NOTE — PROGRESS NOTES
Patient watched dvds 1&2 and heart hugger dvd. Patient ambulated throughout hallways this shift. No complaints of chest pain. Bactroban used in nares.

## 2019-02-07 NOTE — DIABETES MGMT
Glycemic Control Plan of Care 
 
T2DM with current A1c of 7.3% (02/03/2019). See separate notes, 02/05/2019, for assessment of home diabetes management and education. Home diabetes meds prior to admission: 
Actos 30 mg once daily. Glimepride 1 mg daily every morning Metformin 500 mg twice daily with meals. Bydureon 2 mg SQ every 7 days (Friday). POC BG range on 02/06/2019: 138-224 mg/dL. Added basal lantus insulin 5 units daily. Cont on normal sensitivity dose of correctional lispro insulin. POC BG report on 02/07/2019 at time of review: 168 mg/dL. Sched for cardiac surgery tomorrow, Friday, 2/08/2019. Current Meal Intake: 
Patient Vitals for the past 100 hrs: 
 % Diet Eaten 02/07/19 0856 100 % 02/06/19 1730 100 % 02/06/19 1346 100 % 02/06/19 0917 100 % 02/05/19 1802 100 % 02/05/19 1247 100 % 02/05/19 0851 75 % 02/04/19 1259 0 % Recommendation(s): 
1.) Continue inpatient glycemic monitoring and intervention. Assessment: 
Patient is 72year old with past medical history including type 2 diabetes mellitus, hypertension, and arthritis - was admitted on 02/03/2019 with report of chest pain. Noted: 
NSTEMI. 
CAD. Plan for  CABG Friday, 02/08/2019, 
T2DM with current A1c of 7.3% (02/03/2019). Most recent blood glucose values: 
 
Results for Tracey Arita (MRN [id number]) as of 2/7/2019 10:48 Ref. Range 2/6/2019 08:21 2/6/2019 11:38 2/6/2019 16:46 2/6/2019 20:53 GLUCOSE,FAST - POC Latest Ref Range: 70 - 110 mg/dL 224 (H) 193 (H) 154 (H) 138 (H) Results for Tracey Arita (MRN [id number]) as of 2/7/2019 10:48 Ref. Range 2/7/2019 07:15 GLUCOSE,FAST - POC Latest Ref Range: 70 - 110 mg/dL 168 (H) Current A1C: 7.23% (2/03/2019) which is equivalent to estimated average blood glucose of 163 mg/dL during the past 2-3 months. Current hospital diabetes medications: 
Basal lantus insulin  5 units daily since 02/06/2019. Correctional lispro insulin ACHS. Normal sensitivity dose. Total daily dose insulin requirement previous day: 02/06/2019 Lantus: 5 units Lispro: 8 units TDD insulin: 13 units Home diabetes medications: Patient reported on 02/05/2019:  
Actos 30 mg once daily. Glimepride 1 mg daily every morning Metformin 500 mg twice daily with meals. Bydureon 2 mg SQ every 7 days (Friday). Diet: Cardiac regular; consistent carb 1800kcal; no concentrated sweets. Goals:  Blood glucose will be within target range of  mg/dL by 02/10/2019. Education:  _X__  Refer to Diabetes Education Record: 02/05/2019 
           ___  Education not indicated at this time Jf Yuen RN [facility] Pager: [phone]

## 2019-02-08 ENCOUNTER — APPOINTMENT (OUTPATIENT)
Dept: GENERAL RADIOLOGY | Age: 74
DRG: 234 | End: 2019-02-08
Attending: PHYSICIAN ASSISTANT
Payer: MEDICARE

## 2019-02-08 ENCOUNTER — ANESTHESIA (OUTPATIENT)
Dept: CARDIOTHORACIC SURGERY | Age: 74
DRG: 234 | End: 2019-02-08
Payer: MEDICARE

## 2019-02-08 LAB
ADMINISTERED INITIALS, ADMINIT: NORMAL
ANION GAP SERPL CALC-SCNC: 7 MMOL/L (ref 3–18)
APTT PPP: 30.1 SEC (ref 23–36.4)
ARTERIAL PATENCY WRIST A: ABNORMAL
BASE DEFICIT BLD-SCNC: 1 MMOL/L
BASE DEFICIT BLD-SCNC: 2 MMOL/L
BASE DEFICIT BLD-SCNC: 2 MMOL/L
BASE DEFICIT BLD-SCNC: 3 MMOL/L
BASE EXCESS BLD CALC-SCNC: 0 MMOL/L
BASE EXCESS BLD CALC-SCNC: 1 MMOL/L
BASE EXCESS BLD CALC-SCNC: 2 MMOL/L
BASE EXCESS BLD CALC-SCNC: 4 MMOL/L
BASOPHILS # BLD: 0 K/UL (ref 0–0.06)
BASOPHILS NFR BLD: 0 % (ref 0–3)
BDY SITE: ABNORMAL
BODY TEMPERATURE: 33.1
BODY TEMPERATURE: 34.6
BODY TEMPERATURE: 34.6
BODY TEMPERATURE: 35.4
BODY TEMPERATURE: 36
BODY TEMPERATURE: 36.2
BODY TEMPERATURE: 36.7
BUN SERPL-MCNC: 18 MG/DL (ref 7–18)
BUN/CREAT SERPL: 14 (ref 12–20)
CA-I BLD-MCNC: 0.9 MMOL/L (ref 1.12–1.32)
CA-I BLD-MCNC: 0.95 MMOL/L (ref 1.12–1.32)
CA-I BLD-MCNC: 0.97 MMOL/L (ref 1.12–1.32)
CA-I BLD-MCNC: 0.99 MMOL/L (ref 1.12–1.32)
CA-I BLD-MCNC: 1 MMOL/L (ref 1.12–1.32)
CA-I BLD-MCNC: 1.13 MMOL/L (ref 1.12–1.32)
CA-I BLD-MCNC: 1.14 MMOL/L (ref 1.12–1.32)
CA-I BLD-MCNC: 1.17 MMOL/L (ref 1.12–1.32)
CALCIUM SERPL-MCNC: 7.5 MG/DL (ref 8.5–10.1)
CHLORIDE SERPL-SCNC: 106 MMOL/L (ref 100–108)
CO2 SERPL-SCNC: 24 MMOL/L (ref 21–32)
CREAT SERPL-MCNC: 1.29 MG/DL (ref 0.6–1.3)
D50 ADMINISTERED, D50ADM: 0 ML
D50 ORDER, D50ORD: 0 ML
DIFFERENTIAL METHOD BLD: ABNORMAL
EOSINOPHIL # BLD: 0.3 K/UL (ref 0–0.4)
EOSINOPHIL NFR BLD: 3 % (ref 0–5)
ERYTHROCYTE [DISTWIDTH] IN BLOOD BY AUTOMATED COUNT: 13 % (ref 11.6–14.5)
ERYTHROCYTE [DISTWIDTH] IN BLOOD BY AUTOMATED COUNT: 13.1 % (ref 11.6–14.5)
GAS FLOW.O2 O2 DELIVERY SYS: ABNORMAL L/MIN
GAS FLOW.O2 SETTING OXYMISER: 14 BPM
GAS FLOW.O2 SETTING OXYMISER: 4 L/M
GLUCOSE BLD STRIP.AUTO-MCNC: 115 MG/DL (ref 70–110)
GLUCOSE BLD STRIP.AUTO-MCNC: 121 MG/DL (ref 70–110)
GLUCOSE BLD STRIP.AUTO-MCNC: 123 MG/DL (ref 70–110)
GLUCOSE BLD STRIP.AUTO-MCNC: 126 MG/DL (ref 70–110)
GLUCOSE BLD STRIP.AUTO-MCNC: 127 MG/DL (ref 70–110)
GLUCOSE BLD STRIP.AUTO-MCNC: 130 MG/DL (ref 70–110)
GLUCOSE BLD STRIP.AUTO-MCNC: 143 MG/DL (ref 70–110)
GLUCOSE BLD STRIP.AUTO-MCNC: 143 MG/DL (ref 74–106)
GLUCOSE BLD STRIP.AUTO-MCNC: 144 MG/DL (ref 70–110)
GLUCOSE BLD STRIP.AUTO-MCNC: 145 MG/DL (ref 70–110)
GLUCOSE BLD STRIP.AUTO-MCNC: 147 MG/DL (ref 74–106)
GLUCOSE BLD STRIP.AUTO-MCNC: 163 MG/DL (ref 74–106)
GLUCOSE BLD STRIP.AUTO-MCNC: 181 MG/DL (ref 70–110)
GLUCOSE BLD STRIP.AUTO-MCNC: 190 MG/DL (ref 74–106)
GLUCOSE BLD STRIP.AUTO-MCNC: 206 MG/DL (ref 74–106)
GLUCOSE BLD STRIP.AUTO-MCNC: 228 MG/DL (ref 74–106)
GLUCOSE BLD STRIP.AUTO-MCNC: 230 MG/DL (ref 74–106)
GLUCOSE BLD STRIP.AUTO-MCNC: 266 MG/DL (ref 74–106)
GLUCOSE SERPL-MCNC: 187 MG/DL (ref 74–99)
GLUCOSE, GLC: 115 MG/DL
GLUCOSE, GLC: 121 MG/DL
GLUCOSE, GLC: 123 MG/DL
GLUCOSE, GLC: 126 MG/DL
GLUCOSE, GLC: 127 MG/DL
GLUCOSE, GLC: 130 MG/DL
GLUCOSE, GLC: 143 MG/DL
GLUCOSE, GLC: 144 MG/DL
GLUCOSE, GLC: 181 MG/DL
GLUCOSE, GLC: 190 MG/DL
HCO3 BLD-SCNC: 22.4 MMOL/L (ref 22–26)
HCO3 BLD-SCNC: 23.2 MMOL/L (ref 22–26)
HCO3 BLD-SCNC: 23.2 MMOL/L (ref 22–26)
HCO3 BLD-SCNC: 24.2 MMOL/L (ref 22–26)
HCO3 BLD-SCNC: 25 MMOL/L (ref 22–26)
HCO3 BLD-SCNC: 25.3 MMOL/L (ref 22–26)
HCO3 BLD-SCNC: 25.4 MMOL/L (ref 22–26)
HCO3 BLD-SCNC: 25.6 MMOL/L (ref 22–26)
HCO3 BLD-SCNC: 26.6 MMOL/L (ref 22–26)
HCO3 BLD-SCNC: 28.1 MMOL/L (ref 22–26)
HCT VFR BLD AUTO: 34.3 % (ref 36–48)
HCT VFR BLD AUTO: 38.2 % (ref 36–48)
HCT VFR BLD CALC: 25 % (ref 36–49)
HCT VFR BLD CALC: 26 % (ref 36–49)
HCT VFR BLD CALC: 26 % (ref 36–49)
HCT VFR BLD CALC: 27 % (ref 36–49)
HCT VFR BLD CALC: 27 % (ref 36–49)
HCT VFR BLD CALC: 32 % (ref 36–49)
HCT VFR BLD CALC: 33 % (ref 36–49)
HCT VFR BLD CALC: 37 % (ref 36–49)
HGB BLD-MCNC: 10.9 G/DL (ref 12–16)
HGB BLD-MCNC: 11.2 G/DL (ref 12–16)
HGB BLD-MCNC: 11.9 G/DL (ref 13–16)
HGB BLD-MCNC: 12.6 G/DL (ref 12–16)
HGB BLD-MCNC: 12.8 G/DL (ref 13–16)
HGB BLD-MCNC: 8.5 G/DL (ref 12–16)
HGB BLD-MCNC: 8.8 G/DL (ref 12–16)
HGB BLD-MCNC: 8.8 G/DL (ref 12–16)
HGB BLD-MCNC: 9.2 G/DL (ref 12–16)
HGB BLD-MCNC: 9.2 G/DL (ref 12–16)
HIGH TARGET, HITG: 150 MG/DL
INR PPP: 1.2 (ref 0.8–1.2)
INSPIRATION.DURATION SETTING TIME VENT: 1 SEC
INSULIN ADMINSTERED, INSADM: 1.7 UNITS/HOUR
INSULIN ADMINSTERED, INSADM: 1.8 UNITS/HOUR
INSULIN ADMINSTERED, INSADM: 1.9 UNITS/HOUR
INSULIN ADMINSTERED, INSADM: 2 UNITS/HOUR
INSULIN ADMINSTERED, INSADM: 2 UNITS/HOUR
INSULIN ADMINSTERED, INSADM: 2.1 UNITS/HOUR
INSULIN ADMINSTERED, INSADM: 2.5 UNITS/HOUR
INSULIN ADMINSTERED, INSADM: 2.5 UNITS/HOUR
INSULIN ADMINSTERED, INSADM: 2.6 UNITS/HOUR
INSULIN ADMINSTERED, INSADM: 3.6 UNITS/HOUR
INSULIN ORDER, INSORD: 1.7 UNITS/HOUR
INSULIN ORDER, INSORD: 1.8 UNITS/HOUR
INSULIN ORDER, INSORD: 1.9 UNITS/HOUR
INSULIN ORDER, INSORD: 2 UNITS/HOUR
INSULIN ORDER, INSORD: 2 UNITS/HOUR
INSULIN ORDER, INSORD: 2.1 UNITS/HOUR
INSULIN ORDER, INSORD: 2.5 UNITS/HOUR
INSULIN ORDER, INSORD: 2.5 UNITS/HOUR
INSULIN ORDER, INSORD: 2.6 UNITS/HOUR
INSULIN ORDER, INSORD: 3.6 UNITS/HOUR
LOW TARGET, LOT: 80 MG/DL
LYMPHOCYTES # BLD: 1.9 K/UL (ref 0.8–3.5)
LYMPHOCYTES NFR BLD: 20 % (ref 20–51)
MAGNESIUM SERPL-MCNC: 2.6 MG/DL (ref 1.6–2.6)
MCH RBC QN AUTO: 28.8 PG (ref 24–34)
MCH RBC QN AUTO: 29.3 PG (ref 24–34)
MCHC RBC AUTO-ENTMCNC: 33.5 G/DL (ref 31–37)
MCHC RBC AUTO-ENTMCNC: 34.7 G/DL (ref 31–37)
MCV RBC AUTO: 84.5 FL (ref 74–97)
MCV RBC AUTO: 86 FL (ref 74–97)
METAMYELOCYTES NFR BLD MANUAL: 1 %
MINUTES UNTIL NEXT BG, NBG: 60 MIN
MONOCYTES # BLD: 0.2 K/UL (ref 0–1)
MONOCYTES NFR BLD: 2 % (ref 2–9)
MULTIPLIER, MUL: 0.02
MULTIPLIER, MUL: 0.03
NEUTS BAND NFR BLD MANUAL: 16 % (ref 0–5)
NEUTS SEG # BLD: 7.2 K/UL (ref 1.8–8)
NEUTS SEG NFR BLD: 58 % (ref 42–75)
O2/TOTAL GAS SETTING VFR VENT: 100 %
O2/TOTAL GAS SETTING VFR VENT: 50 %
O2/TOTAL GAS SETTING VFR VENT: 70 %
O2/TOTAL GAS SETTING VFR VENT: 70 %
O2/TOTAL GAS SETTING VFR VENT: 75 %
O2/TOTAL GAS SETTING VFR VENT: 80 %
O2/TOTAL GAS SETTING VFR VENT: 80 %
ORDER INITIALS, ORDINIT: NORMAL
PCO2 BLD: 35.2 MMHG (ref 35–45)
PCO2 BLD: 36.2 MMHG (ref 35–45)
PCO2 BLD: 37.8 MMHG (ref 35–45)
PCO2 BLD: 38.2 MMHG (ref 35–45)
PCO2 BLD: 38.4 MMHG (ref 35–45)
PCO2 BLD: 39.8 MMHG (ref 35–45)
PCO2 BLD: 41.1 MMHG (ref 35–45)
PCO2 BLD: 41.3 MMHG (ref 35–45)
PCO2 BLD: 42.6 MMHG (ref 35–45)
PCO2 BLD: 43.3 MMHG (ref 35–45)
PEEP RESPIRATORY: 5 CMH2O
PEEP RESPIRATORY: 5 CMH2O
PH BLD: 7.36 [PH] (ref 7.35–7.45)
PH BLD: 7.37 [PH] (ref 7.35–7.45)
PH BLD: 7.38 [PH] (ref 7.35–7.45)
PH BLD: 7.38 [PH] (ref 7.35–7.45)
PH BLD: 7.39 [PH] (ref 7.35–7.45)
PH BLD: 7.41 [PH] (ref 7.35–7.45)
PH BLD: 7.41 [PH] (ref 7.35–7.45)
PH BLD: 7.43 [PH] (ref 7.35–7.45)
PH BLD: 7.46 [PH] (ref 7.35–7.45)
PH BLD: 7.46 [PH] (ref 7.35–7.45)
PLATELET # BLD AUTO: 139 K/UL (ref 135–420)
PLATELET # BLD AUTO: 163 K/UL (ref 135–420)
PLATELET COMMENTS,PCOM: ABNORMAL
PMV BLD AUTO: 10.2 FL (ref 9.2–11.8)
PMV BLD AUTO: 10.3 FL (ref 9.2–11.8)
PO2 BLD: 101 MMHG (ref 80–100)
PO2 BLD: 146 MMHG (ref 80–100)
PO2 BLD: 195 MMHG (ref 80–100)
PO2 BLD: 352 MMHG (ref 80–100)
PO2 BLD: 353 MMHG (ref 80–100)
PO2 BLD: 359 MMHG (ref 80–100)
PO2 BLD: 378 MMHG (ref 80–100)
PO2 BLD: 410 MMHG (ref 80–100)
PO2 BLD: 74 MMHG (ref 80–100)
PO2 BLD: 82 MMHG (ref 80–100)
POTASSIUM BLD-SCNC: 3.7 MMOL/L (ref 3.5–5.5)
POTASSIUM BLD-SCNC: 3.9 MMOL/L (ref 3.5–5.5)
POTASSIUM BLD-SCNC: 4 MMOL/L (ref 3.5–5.5)
POTASSIUM BLD-SCNC: 4.4 MMOL/L (ref 3.5–5.5)
POTASSIUM BLD-SCNC: 4.4 MMOL/L (ref 3.5–5.5)
POTASSIUM BLD-SCNC: 4.6 MMOL/L (ref 3.5–5.5)
POTASSIUM BLD-SCNC: 4.7 MMOL/L (ref 3.5–5.5)
POTASSIUM BLD-SCNC: 5.3 MMOL/L (ref 3.5–5.5)
POTASSIUM SERPL-SCNC: 4 MMOL/L (ref 3.5–5.5)
PRESSURE SUPPORT SETTING VENT: 8 CMH2O
PRESSURE SUPPORT SETTING VENT: 8 CMH2O
PROTHROMBIN TIME: 15.2 SEC (ref 11.5–15.2)
RBC # BLD AUTO: 4.06 M/UL (ref 4.7–5.5)
RBC # BLD AUTO: 4.44 M/UL (ref 4.7–5.5)
RBC MORPH BLD: ABNORMAL
SAO2 % BLD: 100 % (ref 92–97)
SAO2 % BLD: 95 % (ref 92–97)
SAO2 % BLD: 96 % (ref 92–97)
SAO2 % BLD: 98 % (ref 92–97)
SAO2 % BLD: 99 % (ref 92–97)
SERVICE CMNT-IMP: ABNORMAL
SODIUM BLD-SCNC: 132 MMOL/L (ref 136–145)
SODIUM BLD-SCNC: 134 MMOL/L (ref 136–145)
SODIUM BLD-SCNC: 136 MMOL/L (ref 136–145)
SODIUM BLD-SCNC: 137 MMOL/L (ref 136–145)
SODIUM BLD-SCNC: 137 MMOL/L (ref 136–145)
SODIUM BLD-SCNC: 138 MMOL/L (ref 136–145)
SODIUM BLD-SCNC: 138 MMOL/L (ref 136–145)
SODIUM BLD-SCNC: 141 MMOL/L (ref 136–145)
SODIUM SERPL-SCNC: 137 MMOL/L (ref 136–145)
SPECIMEN TYPE: ABNORMAL
TOTAL RESP. RATE, ITRR: 14
TOTAL RESP. RATE, ITRR: 15
TOTAL RESP. RATE, ITRR: 17
VENTILATION MODE VENT: ABNORMAL
VENTILATION MODE VENT: ABNORMAL
VT SETTING VENT: 600 ML
WBC # BLD AUTO: 3.9 K/UL (ref 4.6–13.2)
WBC # BLD AUTO: 9.7 K/UL (ref 4.6–13.2)

## 2019-02-08 PROCEDURE — 77030002986 HC SUT PROL J&J -A: Performed by: THORACIC SURGERY (CARDIOTHORACIC VASCULAR SURGERY)

## 2019-02-08 PROCEDURE — 77030022199 HC SYS HARV VESL GTNG -G1: Performed by: THORACIC SURGERY (CARDIOTHORACIC VASCULAR SURGERY)

## 2019-02-08 PROCEDURE — 77030018836 HC SOL IRR NACL ICUM -A: Performed by: THORACIC SURGERY (CARDIOTHORACIC VASCULAR SURGERY)

## 2019-02-08 PROCEDURE — 76060000042 HC ANESTHESIA 5.5 TO 6 HR: Performed by: THORACIC SURGERY (CARDIOTHORACIC VASCULAR SURGERY)

## 2019-02-08 PROCEDURE — 77030013313: Performed by: THORACIC SURGERY (CARDIOTHORACIC VASCULAR SURGERY)

## 2019-02-08 PROCEDURE — 85610 PROTHROMBIN TIME: CPT

## 2019-02-08 PROCEDURE — 76010000117 HC CV SURG 5.5 TO 6 HR: Performed by: THORACIC SURGERY (CARDIOTHORACIC VASCULAR SURGERY)

## 2019-02-08 PROCEDURE — 74011000250 HC RX REV CODE- 250: Performed by: PHYSICIAN ASSISTANT

## 2019-02-08 PROCEDURE — 74011636637 HC RX REV CODE- 636/637

## 2019-02-08 PROCEDURE — 36600 WITHDRAWAL OF ARTERIAL BLOOD: CPT

## 2019-02-08 PROCEDURE — B2151ZZ FLUOROSCOPY OF LEFT HEART USING LOW OSMOLAR CONTRAST: ICD-10-PCS | Performed by: INTERNAL MEDICINE

## 2019-02-08 PROCEDURE — 77030010929 HC CLMP VASC FGRTY EDWD -B: Performed by: THORACIC SURGERY (CARDIOTHORACIC VASCULAR SURGERY)

## 2019-02-08 PROCEDURE — 06BQ4ZZ EXCISION OF LEFT SAPHENOUS VEIN, PERCUTANEOUS ENDOSCOPIC APPROACH: ICD-10-PCS | Performed by: THORACIC SURGERY (CARDIOTHORACIC VASCULAR SURGERY)

## 2019-02-08 PROCEDURE — 021309W BYPASS CORONARY ARTERY, FOUR OR MORE ARTERIES FROM AORTA WITH AUTOLOGOUS VENOUS TISSUE, OPEN APPROACH: ICD-10-PCS | Performed by: THORACIC SURGERY (CARDIOTHORACIC VASCULAR SURGERY)

## 2019-02-08 PROCEDURE — 77030013079 HC BLNKT BAIR HGGR 3M -A: Performed by: ANESTHESIOLOGY

## 2019-02-08 PROCEDURE — 74011000250 HC RX REV CODE- 250

## 2019-02-08 PROCEDURE — 65620000000 HC RM CCU GENERAL

## 2019-02-08 PROCEDURE — 74011000258 HC RX REV CODE- 258

## 2019-02-08 PROCEDURE — 77030008683 HC TU ET CUF COVD -A: Performed by: ANESTHESIOLOGY

## 2019-02-08 PROCEDURE — 74011250636 HC RX REV CODE- 250/636

## 2019-02-08 PROCEDURE — B24BZZ4 ULTRASONOGRAPHY OF HEART WITH AORTA, TRANSESOPHAGEAL: ICD-10-PCS | Performed by: ANESTHESIOLOGY

## 2019-02-08 PROCEDURE — 77030018719 HC DRSG PTCH ANTIMIC J&J -A: Performed by: ANESTHESIOLOGY

## 2019-02-08 PROCEDURE — 82962 GLUCOSE BLOOD TEST: CPT

## 2019-02-08 PROCEDURE — 77030013797 HC KT TRNSDUC PRSSR EDWD -A: Performed by: ANESTHESIOLOGY

## 2019-02-08 PROCEDURE — 77030018390 HC SPNG HEMSTAT2 J&J -B: Performed by: THORACIC SURGERY (CARDIOTHORACIC VASCULAR SURGERY)

## 2019-02-08 PROCEDURE — 82803 BLOOD GASES ANY COMBINATION: CPT

## 2019-02-08 PROCEDURE — 77030026855 HC PNCH AORT MYO AEMC -B: Performed by: THORACIC SURGERY (CARDIOTHORACIC VASCULAR SURGERY)

## 2019-02-08 PROCEDURE — 77030002996 HC SUT SLK J&J -A: Performed by: ANESTHESIOLOGY

## 2019-02-08 PROCEDURE — 74011250636 HC RX REV CODE- 250/636: Performed by: PHYSICIAN ASSISTANT

## 2019-02-08 PROCEDURE — 77030002987 HC SUT PROL J&J -B: Performed by: THORACIC SURGERY (CARDIOTHORACIC VASCULAR SURGERY)

## 2019-02-08 PROCEDURE — 77030012390 HC DRN CHST BTL GTNG -B: Performed by: THORACIC SURGERY (CARDIOTHORACIC VASCULAR SURGERY)

## 2019-02-08 PROCEDURE — 74011250637 HC RX REV CODE- 250/637: Performed by: PHYSICIAN ASSISTANT

## 2019-02-08 PROCEDURE — 77030026918 HC ADMN ST IV BLD BD -A: Performed by: ANESTHESIOLOGY

## 2019-02-08 PROCEDURE — 83735 ASSAY OF MAGNESIUM: CPT

## 2019-02-08 PROCEDURE — 77030020061 HC IV BLD WRMR ADMIN SET 3M -B: Performed by: ANESTHESIOLOGY

## 2019-02-08 PROCEDURE — 77030019896 HC KT ARTERIAL LN TELE -B: Performed by: THORACIC SURGERY (CARDIOTHORACIC VASCULAR SURGERY)

## 2019-02-08 PROCEDURE — 77030002933 HC SUT MCRYL J&J -A: Performed by: THORACIC SURGERY (CARDIOTHORACIC VASCULAR SURGERY)

## 2019-02-08 PROCEDURE — 36415 COLL VENOUS BLD VENIPUNCTURE: CPT

## 2019-02-08 PROCEDURE — 74011250636 HC RX REV CODE- 250/636: Performed by: THORACIC SURGERY (CARDIOTHORACIC VASCULAR SURGERY)

## 2019-02-08 PROCEDURE — 77030016037 HC MANFLD MULTI QUES -B: Performed by: ANESTHESIOLOGY

## 2019-02-08 PROCEDURE — 77030015683 HC ADPT CRDPLG MEDT -B: Performed by: THORACIC SURGERY (CARDIOTHORACIC VASCULAR SURGERY)

## 2019-02-08 PROCEDURE — 77030003033 HC SUT WRE CRD J&J -A: Performed by: THORACIC SURGERY (CARDIOTHORACIC VASCULAR SURGERY)

## 2019-02-08 PROCEDURE — 77030020268 HC MISC GENERAL SUPPLY: Performed by: THORACIC SURGERY (CARDIOTHORACIC VASCULAR SURGERY)

## 2019-02-08 PROCEDURE — 77030002916 HC SUT ETHLN J&J -A: Performed by: THORACIC SURGERY (CARDIOTHORACIC VASCULAR SURGERY)

## 2019-02-08 PROCEDURE — 77030005530 HC CATH URETH FOL40 BARD -B: Performed by: THORACIC SURGERY (CARDIOTHORACIC VASCULAR SURGERY)

## 2019-02-08 PROCEDURE — P9045 ALBUMIN (HUMAN), 5%, 250 ML: HCPCS | Performed by: PHYSICIAN ASSISTANT

## 2019-02-08 PROCEDURE — 77030005401 HC CATH RAD ARRO -A: Performed by: ANESTHESIOLOGY

## 2019-02-08 PROCEDURE — 74011000258 HC RX REV CODE- 258: Performed by: PHYSICIAN ASSISTANT

## 2019-02-08 PROCEDURE — 5A1221Z PERFORMANCE OF CARDIAC OUTPUT, CONTINUOUS: ICD-10-PCS | Performed by: THORACIC SURGERY (CARDIOTHORACIC VASCULAR SURGERY)

## 2019-02-08 PROCEDURE — 74011000250 HC RX REV CODE- 250: Performed by: THORACIC SURGERY (CARDIOTHORACIC VASCULAR SURGERY)

## 2019-02-08 PROCEDURE — 85730 THROMBOPLASTIN TIME PARTIAL: CPT

## 2019-02-08 PROCEDURE — C1713 ANCHOR/SCREW BN/BN,TIS/BN: HCPCS | Performed by: THORACIC SURGERY (CARDIOTHORACIC VASCULAR SURGERY)

## 2019-02-08 PROCEDURE — 77030002912 HC SUT ETHBND J&J -A: Performed by: THORACIC SURGERY (CARDIOTHORACIC VASCULAR SURGERY)

## 2019-02-08 PROCEDURE — 85025 COMPLETE CBC W/AUTO DIFF WBC: CPT

## 2019-02-08 PROCEDURE — 77030002996 HC SUT SLK J&J -A: Performed by: THORACIC SURGERY (CARDIOTHORACIC VASCULAR SURGERY)

## 2019-02-08 PROCEDURE — B2111ZZ FLUOROSCOPY OF MULTIPLE CORONARY ARTERIES USING LOW OSMOLAR CONTRAST: ICD-10-PCS | Performed by: INTERNAL MEDICINE

## 2019-02-08 PROCEDURE — 74011250637 HC RX REV CODE- 250/637

## 2019-02-08 PROCEDURE — 93005 ELECTROCARDIOGRAM TRACING: CPT

## 2019-02-08 PROCEDURE — 85027 COMPLETE CBC AUTOMATED: CPT

## 2019-02-08 PROCEDURE — 02100Z9 BYPASS CORONARY ARTERY, ONE ARTERY FROM LEFT INTERNAL MAMMARY, OPEN APPROACH: ICD-10-PCS | Performed by: THORACIC SURGERY (CARDIOTHORACIC VASCULAR SURGERY)

## 2019-02-08 PROCEDURE — 74011000258 HC RX REV CODE- 258: Performed by: THORACIC SURGERY (CARDIOTHORACIC VASCULAR SURGERY)

## 2019-02-08 PROCEDURE — 80048 BASIC METABOLIC PNL TOTAL CA: CPT

## 2019-02-08 PROCEDURE — 77030008771 HC TU NG SALEM SUMP -A: Performed by: ANESTHESIOLOGY

## 2019-02-08 PROCEDURE — C1751 CATH, INF, PER/CENT/MIDLINE: HCPCS | Performed by: ANESTHESIOLOGY

## 2019-02-08 PROCEDURE — 74011636637 HC RX REV CODE- 636/637: Performed by: PHYSICIAN ASSISTANT

## 2019-02-08 PROCEDURE — 71045 X-RAY EXAM CHEST 1 VIEW: CPT

## 2019-02-08 PROCEDURE — 82947 ASSAY GLUCOSE BLOOD QUANT: CPT

## 2019-02-08 PROCEDURE — 77030002982 HC SUT POLYSRB J&J -A: Performed by: THORACIC SURGERY (CARDIOTHORACIC VASCULAR SURGERY)

## 2019-02-08 PROCEDURE — 77030008500 HC TBNG CONN PRSS BD -A: Performed by: ANESTHESIOLOGY

## 2019-02-08 DEVICE — IMPLANTABLE DEVICE: Type: IMPLANTABLE DEVICE | Site: STERNUM | Status: FUNCTIONAL

## 2019-02-08 DEVICE — IMPLANTABLE DEVICE
Type: IMPLANTABLE DEVICE | Site: STERNUM | Status: FUNCTIONAL
Brand: STERNALOCK® BLU SYSTEM

## 2019-02-08 DEVICE — PLATE BNE 8 H X SHP STERNALOCK BLU PRI CLSR SYS: Type: IMPLANTABLE DEVICE | Site: STERNUM | Status: FUNCTIONAL

## 2019-02-08 RX ORDER — VANCOMYCIN HYDROCHLORIDE 1 G/20ML
INJECTION, POWDER, LYOPHILIZED, FOR SOLUTION INTRAVENOUS
Status: DISCONTINUED
Start: 2019-02-08 | End: 2019-02-08

## 2019-02-08 RX ORDER — AMIODARONE HYDROCHLORIDE 200 MG/1
200 TABLET ORAL 3 TIMES DAILY
Status: DISCONTINUED | OUTPATIENT
Start: 2019-02-09 | End: 2019-02-11

## 2019-02-08 RX ORDER — CHLORHEXIDINE GLUCONATE 1.2 MG/ML
10 RINSE ORAL 2 TIMES DAILY
Status: DISCONTINUED | OUTPATIENT
Start: 2019-02-08 | End: 2019-02-13 | Stop reason: HOSPADM

## 2019-02-08 RX ORDER — PROPOFOL 10 MG/ML
VIAL (ML) INTRAVENOUS
Status: DISCONTINUED | OUTPATIENT
Start: 2019-02-08 | End: 2019-02-08 | Stop reason: HOSPADM

## 2019-02-08 RX ORDER — BISACODYL 5 MG
10 TABLET, DELAYED RELEASE (ENTERIC COATED) ORAL DAILY
Status: DISCONTINUED | OUTPATIENT
Start: 2019-02-09 | End: 2019-02-11 | Stop reason: ALTCHOICE

## 2019-02-08 RX ORDER — ACETAMINOPHEN 650 MG/1
SUPPOSITORY RECTAL
Status: DISCONTINUED
Start: 2019-02-08 | End: 2019-02-08

## 2019-02-08 RX ORDER — SODIUM CHLORIDE 0.9 % (FLUSH) 0.9 %
5-40 SYRINGE (ML) INJECTION EVERY 8 HOURS
Status: DISCONTINUED | OUTPATIENT
Start: 2019-02-08 | End: 2019-02-13 | Stop reason: HOSPADM

## 2019-02-08 RX ORDER — MORPHINE SULFATE 2 MG/ML
2-4 INJECTION, SOLUTION INTRAMUSCULAR; INTRAVENOUS
Status: DISPENSED | OUTPATIENT
Start: 2019-02-08 | End: 2019-02-09

## 2019-02-08 RX ORDER — HYDROCODONE BITARTRATE AND ACETAMINOPHEN 5; 325 MG/1; MG/1
1-2 TABLET ORAL
Status: DISCONTINUED | OUTPATIENT
Start: 2019-02-08 | End: 2019-02-09

## 2019-02-08 RX ORDER — CEFAZOLIN SODIUM 2 G/50ML
2 SOLUTION INTRAVENOUS EVERY 8 HOURS
Status: COMPLETED | OUTPATIENT
Start: 2019-02-08 | End: 2019-02-09

## 2019-02-08 RX ORDER — NALOXONE HYDROCHLORIDE 0.4 MG/ML
0.4 INJECTION, SOLUTION INTRAMUSCULAR; INTRAVENOUS; SUBCUTANEOUS AS NEEDED
Status: DISCONTINUED | OUTPATIENT
Start: 2019-02-08 | End: 2019-02-13 | Stop reason: HOSPADM

## 2019-02-08 RX ORDER — VANCOMYCIN HYDROCHLORIDE 1 G/20ML
INJECTION, POWDER, LYOPHILIZED, FOR SOLUTION INTRAVENOUS AS NEEDED
Status: DISCONTINUED | OUTPATIENT
Start: 2019-02-08 | End: 2019-02-08 | Stop reason: HOSPADM

## 2019-02-08 RX ORDER — ASPIRIN 81 MG/1
81 TABLET ORAL DAILY
Status: DISCONTINUED | OUTPATIENT
Start: 2019-02-09 | End: 2019-02-13 | Stop reason: HOSPADM

## 2019-02-08 RX ORDER — MUPIROCIN 20 MG/G
OINTMENT TOPICAL 2 TIMES DAILY
Status: COMPLETED | OUTPATIENT
Start: 2019-02-08 | End: 2019-02-13

## 2019-02-08 RX ORDER — PAPAVERINE HYDROCHLORIDE 30 MG/ML
INJECTION INTRAMUSCULAR; INTRAVENOUS
Status: DISCONTINUED
Start: 2019-02-08 | End: 2019-02-08

## 2019-02-08 RX ORDER — MANNITOL 20 G/100ML
INJECTION, SOLUTION INTRAVENOUS
Status: DISCONTINUED | OUTPATIENT
Start: 2019-02-08 | End: 2019-02-08 | Stop reason: HOSPADM

## 2019-02-08 RX ORDER — CALCIUM CHLORIDE INJECTION 100 MG/ML
INJECTION, SOLUTION INTRAVENOUS
Status: DISCONTINUED
Start: 2019-02-08 | End: 2019-02-08

## 2019-02-08 RX ORDER — METOPROLOL TARTRATE 25 MG/1
12.5 TABLET, FILM COATED ORAL EVERY 12 HOURS
Status: DISCONTINUED | OUTPATIENT
Start: 2019-02-09 | End: 2019-02-10

## 2019-02-08 RX ORDER — ALBUMIN HUMAN 50 G/1000ML
12.5 SOLUTION INTRAVENOUS
Status: DISCONTINUED | OUTPATIENT
Start: 2019-02-08 | End: 2019-02-13 | Stop reason: HOSPADM

## 2019-02-08 RX ORDER — ENOXAPARIN SODIUM 100 MG/ML
40 INJECTION SUBCUTANEOUS EVERY 24 HOURS
Status: DISCONTINUED | OUTPATIENT
Start: 2019-02-10 | End: 2019-02-13 | Stop reason: HOSPADM

## 2019-02-08 RX ORDER — AMINOCAPROIC ACID 250 MG/ML
INJECTION, SOLUTION INTRAVENOUS AS NEEDED
Status: DISCONTINUED | OUTPATIENT
Start: 2019-02-08 | End: 2019-02-08 | Stop reason: HOSPADM

## 2019-02-08 RX ORDER — BUPIVACAINE HYDROCHLORIDE 5 MG/ML
INJECTION, SOLUTION EPIDURAL; INTRACAUDAL
Status: DISCONTINUED
Start: 2019-02-08 | End: 2019-02-08

## 2019-02-08 RX ORDER — PROPOFOL 10 MG/ML
0-50 VIAL (ML) INTRAVENOUS
Status: DISCONTINUED | OUTPATIENT
Start: 2019-02-08 | End: 2019-02-08

## 2019-02-08 RX ORDER — ROCURONIUM BROMIDE 10 MG/ML
INJECTION, SOLUTION INTRAVENOUS AS NEEDED
Status: DISCONTINUED | OUTPATIENT
Start: 2019-02-08 | End: 2019-02-08 | Stop reason: HOSPADM

## 2019-02-08 RX ORDER — ACETAMINOPHEN 325 MG/1
650 TABLET ORAL
Status: DISCONTINUED | OUTPATIENT
Start: 2019-02-08 | End: 2019-02-13 | Stop reason: HOSPADM

## 2019-02-08 RX ORDER — DEXTROSE 50 % IN WATER (D50W) INTRAVENOUS SYRINGE
25-50 AS NEEDED
Status: DISCONTINUED | OUTPATIENT
Start: 2019-02-08 | End: 2019-02-10

## 2019-02-08 RX ORDER — CEFAZOLIN SODIUM IN 0.9 % NACL 2 G/100 ML
PLASTIC BAG, INJECTION (ML) INTRAVENOUS AS NEEDED
Status: DISCONTINUED | OUTPATIENT
Start: 2019-02-08 | End: 2019-02-08 | Stop reason: HOSPADM

## 2019-02-08 RX ORDER — PAPAVERINE HYDROCHLORIDE 30 MG/ML
INJECTION INTRAMUSCULAR; INTRAVENOUS AS NEEDED
Status: DISCONTINUED | OUTPATIENT
Start: 2019-02-08 | End: 2019-02-08 | Stop reason: HOSPADM

## 2019-02-08 RX ORDER — MIDAZOLAM HYDROCHLORIDE 1 MG/ML
INJECTION, SOLUTION INTRAMUSCULAR; INTRAVENOUS AS NEEDED
Status: DISCONTINUED | OUTPATIENT
Start: 2019-02-08 | End: 2019-02-08 | Stop reason: HOSPADM

## 2019-02-08 RX ORDER — ETOMIDATE 2 MG/ML
INJECTION INTRAVENOUS AS NEEDED
Status: DISCONTINUED | OUTPATIENT
Start: 2019-02-08 | End: 2019-02-08 | Stop reason: HOSPADM

## 2019-02-08 RX ORDER — BUPIVACAINE HYDROCHLORIDE 5 MG/ML
INJECTION, SOLUTION EPIDURAL; INTRACAUDAL AS NEEDED
Status: DISCONTINUED | OUTPATIENT
Start: 2019-02-08 | End: 2019-02-08 | Stop reason: HOSPADM

## 2019-02-08 RX ORDER — PHENYLEPHRINE HCL IN 0.9% NACL 30MG/250ML
10-100 PLASTIC BAG, INJECTION (ML) INTRAVENOUS
Status: DISCONTINUED | OUTPATIENT
Start: 2019-02-08 | End: 2019-02-08 | Stop reason: HOSPADM

## 2019-02-08 RX ORDER — MANNITOL 20 G/100ML
INJECTION, SOLUTION INTRAVENOUS
Status: COMPLETED
Start: 2019-02-08 | End: 2019-02-08

## 2019-02-08 RX ORDER — MAGNESIUM SULFATE 100 %
4 CRYSTALS MISCELLANEOUS AS NEEDED
Status: DISCONTINUED | OUTPATIENT
Start: 2019-02-08 | End: 2019-02-10

## 2019-02-08 RX ORDER — ALBUTEROL SULFATE 0.83 MG/ML
2.5 SOLUTION RESPIRATORY (INHALATION)
Status: DISCONTINUED | OUTPATIENT
Start: 2019-02-08 | End: 2019-02-13 | Stop reason: HOSPADM

## 2019-02-08 RX ORDER — ONDANSETRON 2 MG/ML
4 INJECTION INTRAMUSCULAR; INTRAVENOUS
Status: DISCONTINUED | OUTPATIENT
Start: 2019-02-08 | End: 2019-02-13 | Stop reason: HOSPADM

## 2019-02-08 RX ORDER — SODIUM CHLORIDE 0.9 % (FLUSH) 0.9 %
5-40 SYRINGE (ML) INJECTION AS NEEDED
Status: DISCONTINUED | OUTPATIENT
Start: 2019-02-08 | End: 2019-02-13 | Stop reason: HOSPADM

## 2019-02-08 RX ORDER — SODIUM CHLORIDE 9 MG/ML
10 INJECTION, SOLUTION INTRAVENOUS CONTINUOUS
Status: DISCONTINUED | OUTPATIENT
Start: 2019-02-08 | End: 2019-02-13 | Stop reason: HOSPADM

## 2019-02-08 RX ORDER — FENTANYL CITRATE 50 UG/ML
12.5-25 INJECTION, SOLUTION INTRAMUSCULAR; INTRAVENOUS
Status: DISPENSED | OUTPATIENT
Start: 2019-02-08 | End: 2019-02-09

## 2019-02-08 RX ORDER — POLYETHYLENE GLYCOL 3350 17 G/17G
17 POWDER, FOR SOLUTION ORAL DAILY
Status: DISCONTINUED | OUTPATIENT
Start: 2019-02-09 | End: 2019-02-11 | Stop reason: ALTCHOICE

## 2019-02-08 RX ORDER — HEPARIN SODIUM 1000 [USP'U]/ML
INJECTION, SOLUTION INTRAVENOUS; SUBCUTANEOUS AS NEEDED
Status: DISCONTINUED | OUTPATIENT
Start: 2019-02-08 | End: 2019-02-08 | Stop reason: HOSPADM

## 2019-02-08 RX ORDER — DOCUSATE SODIUM 100 MG/1
100 CAPSULE, LIQUID FILLED ORAL 2 TIMES DAILY
Status: DISCONTINUED | OUTPATIENT
Start: 2019-02-09 | End: 2019-02-13 | Stop reason: HOSPADM

## 2019-02-08 RX ORDER — HEPARIN SODIUM 1000 [USP'U]/ML
INJECTION, SOLUTION INTRAVENOUS; SUBCUTANEOUS
Status: COMPLETED
Start: 2019-02-08 | End: 2019-02-08

## 2019-02-08 RX ORDER — SODIUM CHLORIDE 9 MG/ML
INJECTION INTRAMUSCULAR; INTRAVENOUS; SUBCUTANEOUS
Status: DISCONTINUED
Start: 2019-02-08 | End: 2019-02-08

## 2019-02-08 RX ORDER — PROTAMINE SULFATE 10 MG/ML
INJECTION, SOLUTION INTRAVENOUS AS NEEDED
Status: DISCONTINUED | OUTPATIENT
Start: 2019-02-08 | End: 2019-02-08 | Stop reason: HOSPADM

## 2019-02-08 RX ORDER — NITROGLYCERIN 40 MG/100ML
0-200 INJECTION INTRAVENOUS
Status: DISCONTINUED | OUTPATIENT
Start: 2019-02-08 | End: 2019-02-09

## 2019-02-08 RX ORDER — FENTANYL CITRATE 50 UG/ML
INJECTION, SOLUTION INTRAMUSCULAR; INTRAVENOUS AS NEEDED
Status: DISCONTINUED | OUTPATIENT
Start: 2019-02-08 | End: 2019-02-08 | Stop reason: HOSPADM

## 2019-02-08 RX ORDER — OXYCODONE HYDROCHLORIDE 5 MG/1
5-10 TABLET ORAL
Status: DISPENSED | OUTPATIENT
Start: 2019-02-08 | End: 2019-02-09

## 2019-02-08 RX ADMIN — FAMOTIDINE 20 MG: 10 INJECTION INTRAVENOUS at 15:26

## 2019-02-08 RX ADMIN — SODIUM CHLORIDE 10 ML/HR: 900 INJECTION, SOLUTION INTRAVENOUS at 15:15

## 2019-02-08 RX ADMIN — FENTANYL CITRATE 25 MCG: 50 INJECTION, SOLUTION INTRAMUSCULAR; INTRAVENOUS at 16:31

## 2019-02-08 RX ADMIN — CEFAZOLIN SODIUM 2 G: 2 SOLUTION INTRAVENOUS at 19:56

## 2019-02-08 RX ADMIN — AMINOCAPROIC ACID 5 G: 250 INJECTION, SOLUTION INTRAVENOUS at 08:21

## 2019-02-08 RX ADMIN — METOPROLOL TARTRATE 50 MG: 50 TABLET ORAL at 07:09

## 2019-02-08 RX ADMIN — Medication 10 ML: at 06:00

## 2019-02-08 RX ADMIN — Medication 2 G: at 12:50

## 2019-02-08 RX ADMIN — FENTANYL CITRATE 50 MCG: 50 INJECTION, SOLUTION INTRAMUSCULAR; INTRAVENOUS at 09:15

## 2019-02-08 RX ADMIN — FENTANYL CITRATE 150 MCG: 50 INJECTION, SOLUTION INTRAMUSCULAR; INTRAVENOUS at 08:30

## 2019-02-08 RX ADMIN — PHENYLEPHRINE HYDROCHLORIDE 30 MCG/MIN: 10 INJECTION INTRAVENOUS at 14:45

## 2019-02-08 RX ADMIN — ALBUMIN (HUMAN) 12.5 G: 12.5 INJECTION, SOLUTION INTRAVENOUS at 14:49

## 2019-02-08 RX ADMIN — FENTANYL CITRATE 50 MCG: 50 INJECTION, SOLUTION INTRAMUSCULAR; INTRAVENOUS at 09:00

## 2019-02-08 RX ADMIN — MIDAZOLAM HYDROCHLORIDE 2 MG: 1 INJECTION, SOLUTION INTRAMUSCULAR; INTRAVENOUS at 09:00

## 2019-02-08 RX ADMIN — FENTANYL CITRATE 12.5 MCG: 50 INJECTION, SOLUTION INTRAMUSCULAR; INTRAVENOUS at 15:49

## 2019-02-08 RX ADMIN — FENTANYL CITRATE 100 MCG: 50 INJECTION, SOLUTION INTRAMUSCULAR; INTRAVENOUS at 11:45

## 2019-02-08 RX ADMIN — FENTANYL CITRATE 50 MCG: 50 INJECTION, SOLUTION INTRAMUSCULAR; INTRAVENOUS at 10:00

## 2019-02-08 RX ADMIN — CHLORHEXIDINE GLUCONATE 10 ML: 1.2 RINSE ORAL at 18:51

## 2019-02-08 RX ADMIN — FENTANYL CITRATE 100 MCG: 50 INJECTION, SOLUTION INTRAMUSCULAR; INTRAVENOUS at 08:10

## 2019-02-08 RX ADMIN — MANNITOL: 20 INJECTION, SOLUTION INTRAVENOUS at 08:41

## 2019-02-08 RX ADMIN — ACETAMINOPHEN 650 MG: 325 TABLET ORAL at 17:15

## 2019-02-08 RX ADMIN — HEPARIN SODIUM 30000 UNITS: 1000 INJECTION, SOLUTION INTRAVENOUS; SUBCUTANEOUS at 09:45

## 2019-02-08 RX ADMIN — Medication 2 G: at 08:50

## 2019-02-08 RX ADMIN — MIDAZOLAM HYDROCHLORIDE 2 MG: 1 INJECTION, SOLUTION INTRAMUSCULAR; INTRAVENOUS at 08:04

## 2019-02-08 RX ADMIN — ROCURONIUM BROMIDE 30 MG: 10 INJECTION, SOLUTION INTRAVENOUS at 10:13

## 2019-02-08 RX ADMIN — PROTAMINE SULFATE 250 MG: 10 INJECTION, SOLUTION INTRAVENOUS at 12:24

## 2019-02-08 RX ADMIN — FENTANYL CITRATE 100 MCG: 50 INJECTION, SOLUTION INTRAMUSCULAR; INTRAVENOUS at 09:30

## 2019-02-08 RX ADMIN — NITROGLYCERIN 50 MCG/MIN: 40 INJECTION INTRAVENOUS at 13:45

## 2019-02-08 RX ADMIN — ROCURONIUM BROMIDE 50 MG: 10 INJECTION, SOLUTION INTRAVENOUS at 08:10

## 2019-02-08 RX ADMIN — Medication 50 MCG/KG/MIN: at 10:35

## 2019-02-08 RX ADMIN — ETOMIDATE 20 MG: 2 INJECTION INTRAVENOUS at 08:10

## 2019-02-08 RX ADMIN — FENTANYL CITRATE 12.5 MCG: 50 INJECTION, SOLUTION INTRAMUSCULAR; INTRAVENOUS at 16:08

## 2019-02-08 RX ADMIN — MORPHINE SULFATE 2 MG: 2 INJECTION, SOLUTION INTRAMUSCULAR; INTRAVENOUS at 16:59

## 2019-02-08 RX ADMIN — Medication 10 ML: at 15:27

## 2019-02-08 RX ADMIN — PROPOFOL 25 MCG/KG/MIN: 10 INJECTION, EMULSION INTRAVENOUS at 13:35

## 2019-02-08 RX ADMIN — ROCURONIUM BROMIDE 50 MG: 10 INJECTION, SOLUTION INTRAVENOUS at 09:11

## 2019-02-08 RX ADMIN — FENTANYL CITRATE 25 MCG: 50 INJECTION, SOLUTION INTRAMUSCULAR; INTRAVENOUS at 17:55

## 2019-02-08 RX ADMIN — ROCURONIUM BROMIDE 20 MG: 10 INJECTION, SOLUTION INTRAVENOUS at 11:30

## 2019-02-08 RX ADMIN — MORPHINE SULFATE 2 MG: 2 INJECTION, SOLUTION INTRAMUSCULAR; INTRAVENOUS at 15:25

## 2019-02-08 RX ADMIN — FENTANYL CITRATE 100 MCG: 50 INJECTION, SOLUTION INTRAMUSCULAR; INTRAVENOUS at 12:47

## 2019-02-08 RX ADMIN — SODIUM CHLORIDE 2.6 UNITS/HR: 900 INJECTION, SOLUTION INTRAVENOUS at 13:45

## 2019-02-08 RX ADMIN — FENTANYL CITRATE 50 MCG: 50 INJECTION, SOLUTION INTRAMUSCULAR; INTRAVENOUS at 10:45

## 2019-02-08 RX ADMIN — MIDAZOLAM HYDROCHLORIDE 2 MG: 1 INJECTION, SOLUTION INTRAMUSCULAR; INTRAVENOUS at 10:59

## 2019-02-08 RX ADMIN — MUPIROCIN: 20 OINTMENT TOPICAL at 18:51

## 2019-02-08 RX ADMIN — MIDAZOLAM HYDROCHLORIDE 2 MG: 1 INJECTION, SOLUTION INTRAMUSCULAR; INTRAVENOUS at 10:14

## 2019-02-08 RX ADMIN — HYDROCODONE BITARTRATE AND ACETAMINOPHEN 2 TABLET: 5; 325 TABLET ORAL at 22:08

## 2019-02-08 RX ADMIN — SODIUM CHLORIDE 2 UNITS/HR: 900 INJECTION, SOLUTION INTRAVENOUS at 19:52

## 2019-02-08 RX ADMIN — OXYCODONE HYDROCHLORIDE 10 MG: 5 TABLET ORAL at 17:15

## 2019-02-08 NOTE — OP NOTES
CARDIAC SURGERY OPERATIVE NOTE    2/8/2019    PREOPERATIVE DIAGNOSIS:  Coronary artery disease    POSTOPERATIVE DIAGNOSIS:  Coronary artery disease    PROCEDURE:    1. Coronary artery bypass grafting x 5, with the left internal mammary artery to the distal left anterior descending coronary artery, and a sequential saphenous vein graft to the 1st diagonal branch of the left anterior descending coronary artery and to the 1st obtuse marginal branch of the left circumflex coronary artery, and another sequential saphenous vein graft to the distal right coronary artery and to the posterolateral branch of the left circumflex coronary artery  2. Endoscopic vein harvesting   3. Epiaortic scanning   4. Sternal closure utilizing [employer] system      ATTENDING SURGEON:  Geraldine Villarreal MD       ASSISTANT: Beck Neves, PAC, [facility]. (There was no qualified resident available to assist with this operation.)      ANESTHESIA:  General with endotracheal tube. ANESTHESIOLOGIST: Sidney Leonard MD    PERFUSIONIST: Chris Goldmann, CCP     TOTAL BYPASS TIME: 106 minutes    TOTAL CROSS-CLAMP TIME: 75 minutes    ESTIMATED BLOOD LOSS: not applicable (cardiopulmonary bypass)    IMPLANTS: none     SPECIMENS REMOVED: none    INDICATIONS:  Nery Cornejo is a 72 y.o. male who presents with coronary artery disease and a non-ST-elevation myocardial infarction. Cardiac risk factors include family history, dyslipidemia, diabetes mellitus, hypertension. The ejection fraction by echocardiography was 60%. Coronary arteriography was noted to reveal the following significant atheromatous plaque stenoses in the native coronary arteries:   left main 60%  mid LAD 70%  mid RCA 50% occluded  posterior descending branch of the right coronary artery 90%. He is taken for surgical coronary revascularization. FINDINGS: The sternum was of good quality and was not osteoporotic. The ascending aorta was not calcified.   The left ventricle was not hypertrophied and was not dilated. The left anterior descending was 1.4 mm in size and of poor quality, the 1st diagonal was 1.3 mm in size and of good quality, the 1st obtuse marginal was 1.3 mm in size and of fair quality, and the posterolateral branch was 1.2 mm in size and of poor quality, and the distal right coronary artery was 1.5 mm in size and of fair quality. Flows through the grafts appeared adequate. Transesophageal echocardiography at the beginning of the operation showed good left ventricular function, and at the conclusion of the operation showed no new wall motion abnormalities. PROCEDURE:  He was taken to the operating room and was placed on the table in supine position, and after being placed under general anesthesia, was intubated without difficulty. Appropriate monitoring lines were then placed, including a radial arterial line, a pulmonary artery catheter, a Zaidi catheter, and a transesophegeal echo probe. He was then prepped and draped in sterile fashion from neck to toes. A full Time Out was performed and the Cardiac Surgery Operative Checklist was then reviewed, which included confirming the patient's identity and planned operation, and also that the preoperative antibiotics were appropriate and had been delivered in appropriate timely fashion, and that beta blockers were not contraindicated and were administered within the past 24 hours. A median sternotomy incision was then performed with a sternal saw while greater saphenous vein was harvested from the left leg endoscopically by TONI Sanders. The vein appeared to be of good quality and of medium caliber and was placed in a blood-heparin bath. Side branches were ligated with 4-0 Silk suture.   The saphenectomy incisions were closed in layered fashion with absorbable suture after spraying the wounds with platelet gel, the wounds were dressed with sterile gauze, and the sites were wrapped snuggly but not tightly with an Ace wrap. After dividing the thymic remnant, the left internal mammary artery was then exposed by a Rultract retractor and then harvested using skeletonized technique and was divided distally after systemic heparinization. The LIMA appeared to be of good quality. It was sprayed with a papaverine solution and then placed in a sponge soaked with the same solution. The pericardium was then cradled. Epiaortic echocardiography was then undertaken using a probe passed onto the field in a sterile sheath to scan the entire ascending aorta, revealing no thickened intraluminal atheromatous disease. He was then cannulated for cardiopulmonary bypass using a single cannula in the distal ascending aorta through double purse-string sutures, and a single two-staged cannula was placed into the right atrium through a single purse-string suture, all of which were secured by tourniquets. An antegrade cardioplegia cannula was placed into the ascending aorta and was secured by a tourniquet. After confirming that the ACT was greater than 400 seconds, and following retrograde autologous priming of the cardiopulmonary bypass machine, the patient was placed on cardiopulmonary bypass and cooled to 32 degrees Celsius. A retrograde cardioplegia cannula was placed into the coronary sinus via the right atrium and was secured by a tourniquet. Potential coronary targets were then identified. A cross clamp was then placed on the distal ascending aorta, and 500 cc of warm blood potassium cardioplegia was infused into the aortic root to obtain cardiac standstill, followed by 500 cc of cold blood potassium cardioplegia in retrograde fashion. Thereafter, approximately every 10 minutes, additional boluses of cold blood potassium cardioplegia were administered in retrograde fashion. As the bypass grafts were completed, they too were perfused with each subsequent dose of cardioplegia.   A dose of warm cardioplegia was administered in retrograde fashion just prior to removal of the cross clamp. The first anastomosis was constructed to the posterolateral branch of the LCX with a vein graft in end-to-side fashion, using 7-0 Prolene running suture, with patency confirmed with a 1.5 mm probe proximally and distally prior to securing the suture line. The proximal portion of  this vessel appeared heavily diseased, and as it was not well visualized on the cath, I was concerned that it could hinder blood flow to the remainder of the circumflex proximally, so I decided to also at this point bypass the first obtuse marginal branch. The next anastomosis was constructed to the distal RCA with the same vein graft in side-to-side fashion, using 7-0 Prolene running suture, with patency confirmed with a 1.5 mm probe proximally and distally prior to securing the suture line. This created a sequential vein graft. The next anastomosis was performed with another vein graft to the 1st obtuse marginal in end-to-side fashion, using 7-0 Prolene running suture, with patency confirmed with a 1.5 mm probe proximally and distally prior to securing the suture line. The next anastomosis was performed with the same vein graft to the 1st diagonal in side-to-side fashion, using 7-0 Prolene running suture, with patency confirmed with a 1.5 mm probe proximally and distally prior to securing the suture line. This created a sequential vein graft. The left internal mammary artery was then tunneled through an opening created in the left lateral pericardium (with care not to impinge on the phrenic nerve while creating the opening) and was anastomosed end-to-side to the distal left anterior descending coronary artery, using 8-0 Prolene running suture, with patency confirmed with a 1.5 mm probe proximally and distally prior to securing the suture line.          With the patient being warmed and the aortic cross clamp still in place, 2 proximal anastomoses were created on the ascending aorta with the vein grafts in end-to-side fashion at 4-mm punch hole sites using 5-0 Prolene running suture and  were marked with ligaclips. Flows through the grafts appeared adequate by palpation and milking. After a total time of 75 minutes, the aortic cross clamp was removed. Normal sinus rhythm returned and remained. The retrograde cardioplegia cannula was removed, and epicardial pacing wires were placed on the right atrium and left ventricle and were secured to the skin with interrupted sutures. Drainage tubes were positioned in the mediastinum (straight 28 Fr.) and left pleural space (right-angled 28 Fr.), were secured to the skin with interrupted sutures, and were then attached to a Pleurevac drainage system. After allowing adequate time for reperfusion, he was weaned successfully from cardiopulmonary bypass after a total time of 106 minutes using low dose pressor support and with atrial pacing. There were no ischemic EKG changes. The antegrade cardioplegia cannula was removed as were the cardiopulmonary bypass cannulas, and Protamine was administered. With adequate hemostasis present, the chest was then closed by approximating the pericardium with interrupted silk sutures, then using 4 parasternal wires and 3 Sternalok plates after spraying the edges with platelet-rich plasma. The sternal wound was irrigated with sterile saline, and 60 cc of Marcaine was infiltrated into the parasternal tissues for postoperative pain control. Wound closure was then completed with by running a heavy nylon suture in the subcutaneous layer, then running absorbable suture in the superficial subcutaneous layer, and a running absorbable suture in the subcuticular layer. The procedure was then completed after application of dry sterile dressings to all wounds.      He tolerated the procedure well and was transported to the intensive care unit in stable condition. Needle and sponge counts were correct following the procedure. There were no complications. I was present in the operating room from the time the patient arrived until the patient left the room and performed the entire procedure as dictated above.       Aurelia Bahena MD

## 2019-02-08 NOTE — PROGRESS NOTES
[facility] - Progress Note Admit Date: 2/3/2019 Assessment:  
 
Hospital Problems  Date Reviewed: 2/6/2019 Codes Class Noted POA * (Principal) NSTEMI (non-ST [facility]) [facility] ICD-10-CM: I21.4 ICD-9-CM: 410.70  2/6/2019 Yes Chest pain ICD-10-CM: R07.9 ICD-9-CM: 786.50  2/3/2019 Unknown  
   
  
 
 
 
 
-CAD s/p CABG X5 (LIMA to LAD, SVG to Diag1/OM1, SVG to dRCA/PLB) 2/8/2019.  
-NSTEMI- with peak troponin at 9.  
-Normal EF 60% by echo this admission. 
-Acute bronchitis- on abx -HTN- elevated on examination but says he has been stable at home. 
-DM Type II on oral meds 
-Dyslipidemia- on statin 
-Renal insult 
  
-No primary cardiologist, Dr. Rox Morales PMD 
 
Plan:  
 
Evaluated s/p CABG. Currently intubated. Hemodynamics stable on nitro drip. Continue routine post operative care. Subjective: Intubated Objective:  
  
Patient Vitals for the past 8 hrs: 
 Temp Pulse Resp BP SpO2  
02/08/19 1352  93 17    
02/08/19 1347   14    
02/08/19 1345  94 17 (!) 136/92 98 % 02/08/19 1340  84 14  98 % 02/08/19 1336 97.9 °F (36.6 °C) 84 12 135/77 99 % 02/08/19 1335  95 15  98 % 02/08/19 0709    164/80   
02/08/19 0708 98.4 °F (36.9 °C) 69 20 164/84 95 % Patient Vitals for the past 96 hrs: 
 Weight  
02/08/19 0708 91.1 kg (200 lb 14.4 oz) 02/06/19 0614 94.7 kg (208 lb 12.8 oz) 02/05/19 0554 93.8 kg (206 lb 12.8 oz) 02/04/19 2100 96.6 kg (213 lb) Intake/Output Summary (Last 24 hours) at 2/8/2019 1359 Last data filed at 2/8/2019 1335 Gross per 24 hour Intake 2990 ml Output 2825 ml Net 165 ml Physical Exam: 
General:  No distress Neck:  no JVD Lungs:  clear to auscultation bilaterally Heart:  regular rate and rhythm Abdomen:  abdomen is soft Extremities:  no edema Data Review:  
 
Labs: Results:  
   
Chemistry Recent Labs 02/07/19 
1825 02/05/19 
1619 * 178*  139  
K 3.8 4.0  
 105 CO2 27 29 BUN 23* 17  
CREA 1.49* 1.45* CA 8.4* 8.6 AGAP 7 5 BUCR 15 12 CBC w/Diff Recent Labs 02/08/19 
0543 WBC 3.9*  
RBC 4.44* HGB 12.8* HCT 38.2  Cardiac Enzymes No results found for: CPK, CK, CKMMB, CKMB, RCK3, CKMBT, CKNDX, CKND1, CANDI, TROPT, TROIQ, GERMAINE, TROPT, TNIPOC, BNP, BNPP Coagulation Recent Labs 02/06/19 
1362 APTT 81.1* Lipid Panel Lab Results Component Value Date/Time Cholesterol, total 100 02/04/2019 02:15 AM  
 HDL Cholesterol 50 02/04/2019 02:15 AM  
 LDL, calculated 33.8 02/04/2019 02:15 AM  
 VLDL, calculated 16.2 02/04/2019 02:15 AM  
 Triglyceride 81 02/04/2019 02:15 AM  
 CHOL/HDL Ratio 2.0 02/04/2019 02:15 AM  
  
BNP No results found for: BNP, BNPP, XBNPT Liver Enzymes No results for input(s): TP, ALB, TBIL, AP, SGOT, GPT in the last 72 hours. No lab exists for component: DBIL Digoxin Thyroid Studies Lab Results Component Value Date/Time TSH 0.45 02/03/2019 03:57 PM  
    
 
Signed By: JOSE Ramirez February 8, 2019

## 2019-02-08 NOTE — ANESTHESIA POSTPROCEDURE EVALUATION
Procedure(s): CORONARY ARTERY BYPASS GRAFT (CABG) TIMES FIVE, LEFT ENDOSCOPIC SAPHENOUS VEIN GRAFT HARVEST AND LEFT INTERNAL MAMMARY HARVEST/STERNALOK 
ESOPHAGEAL TRANS ECHOCARDIOGRAM. Anesthesia Post Evaluation Multimodal analgesia: multimodal analgesia used between 6 hours prior to anesthesia start to PACU discharge Patient location during evaluation: ICU Patient participation: complete - patient participated Level of consciousness: awake and obtunded/minimal responses Pain management: adequate Airway patency: patent Anesthetic complications: no 
Cardiovascular status: stable Respiratory status: ETT and ventilator Hydration status: acceptable Post anesthesia nausea and vomiting:  none Visit Vitals /77 Pulse 84 Temp 36.6 °C (97.9 °F) Resp 14 Ht 6' (1.829 m) Wt 91.1 kg (200 lb 14.4 oz) SpO2 99% BMI 27.25 kg/m²

## 2019-02-08 NOTE — ANESTHESIA PREPROCEDURE EVALUATION
Anesthetic History No history of anesthetic complications Review of Systems / Medical History Patient summary reviewed and pertinent labs reviewed Pulmonary Within defined limits Neuro/Psych Within defined limits Cardiovascular Hypertension Past MI, CAD and hyperlipidemia Exercise tolerance: <4 METS Comments: EF 55% GI/Hepatic/Renal 
Within defined limits Endo/Other Diabetes: type 2 Arthritis Other Findings Physical Exam 
 
Airway Mallampati: II 
TM Distance: > 6 cm Neck ROM: normal range of motion Mouth opening: Normal 
 
 Cardiovascular Regular rate and rhythm,  S1 and S2 normal,  no murmur, click, rub, or gallop Dental 
No notable dental hx Pulmonary Breath sounds clear to auscultation Abdominal 
GI exam deferred Other Findings Anesthetic Plan ASA: 4 Anesthesia type: general 
 
Monitoring Plan: Arterial line, BIS, CVP, Olmitz-Wojciech and SOFI Post-op pain plan if not by surgeon: IV PCA Induction: Intravenous Anesthetic plan and risks discussed with: Patient

## 2019-02-08 NOTE — PROGRESS NOTES
[facility] Pt ready for OR this am. Feels well, no s/s of flu. Lamar Zuniga wedding band given to wife, Dragan Villalba.   
 
Marti Davila PA-C CVTS 
02/08/19, 7:44 AM

## 2019-02-08 NOTE — PROGRESS NOTES
Pt arrived from OR at 1335. Extubated at [phone]. C/o 8/10 sternal incision site pain. Gave IV morphine. Received 1 albumin for SBP less than 90 and CVP 6. SBP increased to 120. Chest tube output within normal limits. Good urine output. Wife and family updated on POC.

## 2019-02-08 NOTE — PROGRESS NOTES
CVT PRE-PROCEDURE CHECKLIST 1. Procedure: CABG 2. Allergies: Allergies Allergen Reactions  Augmentin [Amoxicillin-Pot Clavulanate] Other (comments) Turns his tongue black 3. Procedure consent signed by patient: Lew Red 4. Procedure consent signed by surgeon:  
 
NO, to be signed prior to surgery 5. Anesthesia consent signed by patient and anesthesiologist:  
 
No, to be signed prior to surgery 6. Blood consent signed: Yes A. Type and cross match done (date): 02/07/2019 
B. Blood band number: [id number] 
C. Number of units available: 2 
D. Blood transfusion history faxed to blood bank: Yes 
 
7. History and physical in [facility] within last 30 days or with update: Yes 
 
8. 2 sheets of patient labels on daily chart: Yes 
 
9. Cardiac cath report in [facility]: Yes 10. Carotid study report available in [facility] if ordered (within last 6 months): Yes 11. CXR report available in [facility] (within last 30 days): YES 12. ABG's completed and in [facility] if ordered: Not applicable 13. Patient ID bracelet and allergy bracelet on patient and readable: Yes 13. Preop labs as ordered and available in [facility]: Yes 14. NPO since midnight: 02/08/2019  0000 15. ECG available in [facility] (within 30 days of surgery): Yes 16. Vital signs obtained within 1 hour of procedure (for CABG's include both arms): Yes 17. Clipped per orders at: 0400 02/08/2019 
 
18. Bathed with hibiclens at: 0430 02/08/2019 19. All jewelry, glasses, contact lenses, dentures, hearing aides removed: Yes 20. IV access: #1 #20g Left forearm #2 #20g Right forearm 21. DP/ PT pulses marked bilaterally: Yes 22. Anticoagulation stop time: date 02/08/2019; time 0400 
 
23. Last beta blocker dose: date 02/08/2019; time 0709 24. Blood Glucose within last 60 mins (diabetics): YES 
 
25. UA within 30 days: YES 
 
26.  Heart Hugger Size: Large DUAL RN CHECK OFF: Kirsten Pfeiffer RN and Tracee Barry RN

## 2019-02-08 NOTE — PROGRESS NOTES
Hospitalist Progress Note NAME:  Odilia Montanez. :   1945 MRN:   [id number] Date/Time:  2019 Subjective: Chief Complaint:   
Pt is getting his CABG today. No issues noted. Review of Systems: Y  N       Y  N 
[] []  Fever/chills                                               [] []  Chest Pain 
[] []  Cough                                                       [] []  Diarrhea  
[] []  Sputum                                                     [] []  Constipation 
[] []  SOB/SIMONS                                                [] []  Nausea/Vomit 
[] []  Abd Pain                                                    [] []  Tolerating PT 
[] []  Dysuria                                                      [] []  Tolerating Diet 
 
 []Unable to obtain  ROS due to  []mental status change  []sedated   []intubated Past Med History and Social history reviewed. No changes. [x]Current Medication list and allergies reviewed* Objective:  
Vitals: 
Visit Vitals /80 (BP 1 Location: Right arm) Pulse 69 Temp 98.4 °F (36.9 °C) Resp 20 Ht 6' (1.829 m) Wt 91.1 kg (200 lb 14.4 oz) SpO2 95% BMI 27.25 kg/m² Temp (24hrs), Av.4 °F (36.9 °C), Min:97.9 °F (36.6 °C), Max:98.8 °F (37.1 °C) Last 24hr Input/Output: 
 
Intake/Output Summary (Last 24 hours) at 2019 1034 Last data filed at 2019 0945 Gross per 24 hour Intake 980 ml Output 2000 ml Net -1020 ml  
  
 
 
 []Telemetry Reviewed     []NSR []PAC/PVCs   []Afib  []Paced   []NSVT []Zaidi []NGT  []Intubated on vent Lab Data Reviewed: No components found for: [place] Recent Labs 19 
0543 19 
1825 19 
1619 NA  --  137 139 K  --  3.8 4.0  
CL  --  103 105 CO2  --  27 29 BUN  --  23* 17  
CREA  --  1.49* 1.45* GLU  --  218* 178* CA  --  8.4* 8.6 WBC 3.9*  --   --   
HGB 12.8*  --   --   
HCT 38.2  --   --   
  --   --   
 
 
 []  I have personally reviewed the   []xray  []CT scan Assessment/Plan: 1. Chest pain 2 Elevated troponin  
3 NSTEMI - by enzymes 4 Fever - likely bronchitis - so far negative cultures , on empiric Levaquin ( changed overnight from Zithromax )   
  
PLAN   
- s/p Cath - multivessel disease - / getting CABG today. - s/p Levaquin now off it. 
- Continue asa, statin, lopressor, Benicar. DM 2; continue  Lantus. Stable. I will transfer pt to CT surgery service; discussed with CT surgery PA; call if needed. ___________________________________________________ Total time spent with patient:     minutes []Critical Care time:    minutes Care Plan discussed with: 
  []Patient   []Family    []Care Manager    []Nursing   [x]Consultant/Specialist : 
 
  []  >50% of visit spent in counseling and coordination of care (Discussed []CODE status,  []Care Plan, []D/C Planning) Prophylaxis:  []Lovenox  []Coumadin  []Hep SQ  []SCDs  []H2B/PPI Disposition:  []Home w/ Family   []HH PT,OT,RN   []SNF/LTC   []SAH/Rehab 
___________________________________________________ Hospitalist: Patrica Loving MD  
 
___________________________________________________ *Medications reviewed: 
Current Facility-Administered Medications Medication Dose Route Frequency  EPINEPHrine (ADRENALIN) 4 mg in 0.9% sodium chloride 250 mL infusion  1-10 mcg/min IntraVENous TITRATE  PHENYLephrine (PF)(MILTON-SYNEPHRINE) 30 mg in 0.9% sodium chloride 250 mL infusion   mcg/min IntraVENous TITRATE  aminocaproic acid (AMICAR) 10 g in 0.9% sodium chloride 500 mL infusion  1 g/hr IntraVENous CONTINUOUS  
 acetaminophen (TYLENOL) 650 mg suppository  vancomycin (VANCOCIN) 1,000 mg injection  acetaminophen (TYLENOL) 325 mg suppository  papaverine 30 mg/mL injection  bupivacaine (PF) (MARCAINE) 0.5 % (5 mg/mL) injection  sodium chloride 0.9% injection  thrombin (bovine) (THROMBIN-I) 5,000 unit topical solution  insulin regular (Novolin,Humulin) premix infusion  calcium chloride 100 mg/mL (10 %) injection  thrombin (bovine) (THROMBIN-I) 5,000 unit topical solution  cardioplegia (WARM INDUCTION 4:1) with 40 mEq KCL solution solution  cardioplegia (maintenance 4:1) with 36 mEq KCL solution solution  cardioplegia (REPERFUSATE 4:1) with 15 mEq KCL solution solution  electrolyte-r (NORMOSOL R) infusion  bupivacaine (PF) (MARCAINE) 0.5 % (5 mg/mL) injection    PRN  
 vancomycin (VANCOCIN) injection    PRN  
 sodium chloride 0.9 % bolus infusion    CONTINUOUS  papaverine injection    PRN  thrombin (bovine) (THROMBIN-I) 5,000 unit topical solution    PRN  
 sodium chloride (NS) flush 5-40 mL  5-40 mL IntraVENous Q8H  
 sodium chloride (NS) flush 5-40 mL  5-40 mL IntraVENous PRN  
 mupirocin (BACTROBAN) 2 % ointment   Both Nostrils BID  ceFAZolin (ANCEF) 2g IVPB in 50 mL D5W  2 g IntraVENous ON CALL TO OR  
 0.9% sodium chloride infusion 500 mL  500 mL IntraVENous CONTINUOUS  
 fluticasone (FLONASE) 50 mcg/actuation nasal spray 2 Spray  2 Spray Both Nostrils BID  insulin glargine (LANTUS) injection 5 Units  5 Units SubCUTAneous DAILY  metoprolol tartrate (LOPRESSOR) tablet 50 mg  50 mg Oral Q12H  
 heparin 25,000 units in D5W 250 ml infusion  10.3-25 Units/kg/hr IntraVENous TITRATE  insulin lispro (HUMALOG) injection   SubCUTAneous AC&HS  
 glucose chewable tablet 16 g  4 Tab Oral PRN  
 glucagon (GLUCAGEN) injection 1 mg  1 mg IntraMUSCular PRN  
 dextrose (D50W) injection syrg 12.5-25 g  25-50 mL IntraVENous PRN  
 atorvastatin (LIPITOR) tablet 40 mg  40 mg Oral DAILY  olmesartan (BENICAR) tablet 40 mg  40 mg Oral DAILY  pantoprazole (PROTONIX) tablet 40 mg  40 mg Oral ACB  oxyCODONE-acetaminophen (PERCOCET) 5-325 mg per tablet 1-2 Tab  1-2 Tab Oral Q6H PRN  
  aspirin chewable tablet 81 mg  81 mg Oral DAILY  sodium chloride (NS) flush 5-40 mL  5-40 mL IntraVENous Q8H  
 sodium chloride (NS) flush 5-40 mL  5-40 mL IntraVENous PRN  
 magnesium hydroxide (MILK OF MAGNESIA) 400 mg/5 mL oral suspension 30 mL  30 mL Oral DAILY PRN  
 docusate sodium (COLACE) capsule 100 mg  100 mg Oral BID  acetaminophen (TYLENOL) tablet 650 mg  650 mg Oral Q4H PRN Facility-Administered Medications Ordered in Other Encounters Medication Dose Route Frequency  ceFAZolin (ANCEF) 2g in 100 ml 0.9% NS IVPB   IntraVENous PRN  
 midazolam (VERSED) injection   IntraVENous PRN  
 fentaNYL citrate (PF) injection   IntraVENous PRN  
 etomidate (AMIDATE) 2 mg/mL injection   IntraVENous PRN  
 rocuronium (ZEMURON) injection   IntraVENous PRN  
 aminocaproic acid (AMICAR) injection   IntraVENous PRN  
 aminocaproic acid (AMICAR) 10 g in 0.9% sodium chloride 500 mL infusion   IntraVENous CONTINUOUS  
 mannitol (OSMITROL) 20 % infusion   IntraVENous CONTINUOUS  
 heparin (porcine) 1,000 unit/mL injection   IntraVENous PRN

## 2019-02-09 ENCOUNTER — APPOINTMENT (OUTPATIENT)
Dept: GENERAL RADIOLOGY | Age: 74
DRG: 234 | End: 2019-02-09
Attending: PHYSICIAN ASSISTANT
Payer: MEDICARE

## 2019-02-09 LAB
ADMINISTERED INITIALS, ADMINIT: NORMAL
ANION GAP SERPL CALC-SCNC: 7 MMOL/L (ref 3–18)
ARTERIAL PATENCY WRIST A: ABNORMAL
ATRIAL RATE: 91 BPM
BACTERIA SPEC CULT: NORMAL
BACTERIA SPEC CULT: NORMAL
BASE DEFICIT BLD-SCNC: 3 MMOL/L
BDY SITE: ABNORMAL
BODY TEMPERATURE: 100.8
BUN SERPL-MCNC: 16 MG/DL (ref 7–18)
BUN/CREAT SERPL: 15 (ref 12–20)
CALCIUM SERPL-MCNC: 7.6 MG/DL (ref 8.5–10.1)
CALCULATED P AXIS, ECG09: 46 DEGREES
CALCULATED R AXIS, ECG10: -59 DEGREES
CALCULATED T AXIS, ECG11: -18 DEGREES
CHLORIDE SERPL-SCNC: 109 MMOL/L (ref 100–108)
CO2 SERPL-SCNC: 24 MMOL/L (ref 21–32)
CREAT SERPL-MCNC: 1.05 MG/DL (ref 0.6–1.3)
D50 ADMINISTERED, D50ADM: 0 ML
D50 ORDER, D50ORD: 0 ML
DIAGNOSIS, 93000: NORMAL
ERYTHROCYTE [DISTWIDTH] IN BLOOD BY AUTOMATED COUNT: 13.4 % (ref 11.6–14.5)
GAS FLOW.O2 O2 DELIVERY SYS: ABNORMAL L/MIN
GAS FLOW.O2 SETTING OXYMISER: 2 L/M
GLUCOSE BLD STRIP.AUTO-MCNC: 102 MG/DL (ref 70–110)
GLUCOSE BLD STRIP.AUTO-MCNC: 113 MG/DL (ref 70–110)
GLUCOSE BLD STRIP.AUTO-MCNC: 116 MG/DL (ref 70–110)
GLUCOSE BLD STRIP.AUTO-MCNC: 117 MG/DL (ref 70–110)
GLUCOSE BLD STRIP.AUTO-MCNC: 119 MG/DL (ref 70–110)
GLUCOSE BLD STRIP.AUTO-MCNC: 119 MG/DL (ref 70–110)
GLUCOSE BLD STRIP.AUTO-MCNC: 124 MG/DL (ref 70–110)
GLUCOSE BLD STRIP.AUTO-MCNC: 128 MG/DL (ref 70–110)
GLUCOSE BLD STRIP.AUTO-MCNC: 130 MG/DL (ref 70–110)
GLUCOSE BLD STRIP.AUTO-MCNC: 130 MG/DL (ref 70–110)
GLUCOSE BLD STRIP.AUTO-MCNC: 135 MG/DL (ref 70–110)
GLUCOSE BLD STRIP.AUTO-MCNC: 140 MG/DL (ref 70–110)
GLUCOSE BLD STRIP.AUTO-MCNC: 145 MG/DL (ref 70–110)
GLUCOSE BLD STRIP.AUTO-MCNC: 145 MG/DL (ref 70–110)
GLUCOSE BLD STRIP.AUTO-MCNC: 158 MG/DL (ref 70–110)
GLUCOSE BLD STRIP.AUTO-MCNC: 162 MG/DL (ref 70–110)
GLUCOSE BLD STRIP.AUTO-MCNC: 162 MG/DL (ref 70–110)
GLUCOSE BLD STRIP.AUTO-MCNC: 72 MG/DL (ref 70–110)
GLUCOSE BLD STRIP.AUTO-MCNC: 77 MG/DL (ref 70–110)
GLUCOSE BLD STRIP.AUTO-MCNC: 84 MG/DL (ref 70–110)
GLUCOSE BLD STRIP.AUTO-MCNC: 92 MG/DL (ref 70–110)
GLUCOSE SERPL-MCNC: 138 MG/DL (ref 74–99)
GLUCOSE, GLC: 102 MG/DL
GLUCOSE, GLC: 113 MG/DL
GLUCOSE, GLC: 116 MG/DL
GLUCOSE, GLC: 117 MG/DL
GLUCOSE, GLC: 119 MG/DL
GLUCOSE, GLC: 119 MG/DL
GLUCOSE, GLC: 124 MG/DL
GLUCOSE, GLC: 128 MG/DL
GLUCOSE, GLC: 130 MG/DL
GLUCOSE, GLC: 130 MG/DL
GLUCOSE, GLC: 135 MG/DL
GLUCOSE, GLC: 140 MG/DL
GLUCOSE, GLC: 145 MG/DL
GLUCOSE, GLC: 145 MG/DL
GLUCOSE, GLC: 158 MG/DL
GLUCOSE, GLC: 162 MG/DL
GLUCOSE, GLC: 162 MG/DL
GLUCOSE, GLC: 72 MG/DL
GLUCOSE, GLC: 77 MG/DL
GLUCOSE, GLC: 84 MG/DL
GLUCOSE, GLC: 92 MG/DL
HCO3 BLD-SCNC: 21.6 MMOL/L (ref 22–26)
HCT VFR BLD AUTO: 34 % (ref 36–48)
HGB BLD-MCNC: 11.6 G/DL (ref 13–16)
HIGH TARGET, HITG: 150 MG/DL
INSULIN ADMINSTERED, INSADM: 0.5 UNITS/HOUR
INSULIN ADMINSTERED, INSADM: 0.8 UNITS/HOUR
INSULIN ADMINSTERED, INSADM: 1.4 UNITS/HOUR
INSULIN ADMINSTERED, INSADM: 1.6 UNITS/HOUR
INSULIN ADMINSTERED, INSADM: 1.7 UNITS/HOUR
INSULIN ADMINSTERED, INSADM: 1.7 UNITS/HOUR
INSULIN ADMINSTERED, INSADM: 1.8 UNITS/HOUR
INSULIN ADMINSTERED, INSADM: 1.9 UNITS/HOUR
INSULIN ADMINSTERED, INSADM: 2 UNITS/HOUR
INSULIN ADMINSTERED, INSADM: 2.1 UNITS/HOUR
INSULIN ADMINSTERED, INSADM: 3 UNITS/HOUR
INSULIN ADMINSTERED, INSADM: 3.2 UNITS/HOUR
INSULIN ADMINSTERED, INSADM: 3.4 UNITS/HOUR
INSULIN ADMINSTERED, INSADM: 3.8 UNITS/HOUR
INSULIN ADMINSTERED, INSADM: 3.9 UNITS/HOUR
INSULIN ADMINSTERED, INSADM: 4 UNITS/HOUR
INSULIN ADMINSTERED, INSADM: 5.1 UNITS/HOUR
INSULIN ADMINSTERED, INSADM: 5.1 UNITS/HOUR
INSULIN ADMINSTERED, INSADM: 6.1 UNITS/HOUR
INSULIN ORDER, INSORD: 0.5 UNITS/HOUR
INSULIN ORDER, INSORD: 0.8 UNITS/HOUR
INSULIN ORDER, INSORD: 1.4 UNITS/HOUR
INSULIN ORDER, INSORD: 1.6 UNITS/HOUR
INSULIN ORDER, INSORD: 1.7 UNITS/HOUR
INSULIN ORDER, INSORD: 1.7 UNITS/HOUR
INSULIN ORDER, INSORD: 1.8 UNITS/HOUR
INSULIN ORDER, INSORD: 1.9 UNITS/HOUR
INSULIN ORDER, INSORD: 2 UNITS/HOUR
INSULIN ORDER, INSORD: 2.1 UNITS/HOUR
INSULIN ORDER, INSORD: 3 UNITS/HOUR
INSULIN ORDER, INSORD: 3.2 UNITS/HOUR
INSULIN ORDER, INSORD: 3.4 UNITS/HOUR
INSULIN ORDER, INSORD: 3.8 UNITS/HOUR
INSULIN ORDER, INSORD: 3.9 UNITS/HOUR
INSULIN ORDER, INSORD: 4 UNITS/HOUR
INSULIN ORDER, INSORD: 5.1 UNITS/HOUR
INSULIN ORDER, INSORD: 5.1 UNITS/HOUR
INSULIN ORDER, INSORD: 6.1 UNITS/HOUR
LOW TARGET, LOT: 80 MG/DL
MAGNESIUM SERPL-MCNC: 2.2 MG/DL (ref 1.6–2.6)
MCH RBC QN AUTO: 29.2 PG (ref 24–34)
MCHC RBC AUTO-ENTMCNC: 34.1 G/DL (ref 31–37)
MCV RBC AUTO: 85.6 FL (ref 74–97)
MINUTES UNTIL NEXT BG, NBG: 60 MIN
MULTIPLIER, MUL: 0.03
MULTIPLIER, MUL: 0.04
MULTIPLIER, MUL: 0.05
MULTIPLIER, MUL: 0.06
ORDER INITIALS, ORDINIT: NORMAL
P-R INTERVAL, ECG05: 198 MS
PCO2 BLD: 36.9 MMHG (ref 35–45)
PH BLD: 7.38 [PH] (ref 7.35–7.45)
PLATELET # BLD AUTO: 138 K/UL (ref 135–420)
PMV BLD AUTO: 10.5 FL (ref 9.2–11.8)
PO2 BLD: 97 MMHG (ref 80–100)
POTASSIUM SERPL-SCNC: 4.1 MMOL/L (ref 3.5–5.5)
Q-T INTERVAL, ECG07: 418 MS
QRS DURATION, ECG06: 144 MS
QTC CALCULATION (BEZET), ECG08: 514 MS
RBC # BLD AUTO: 3.97 M/UL (ref 4.7–5.5)
SAO2 % BLD: 97 % (ref 92–97)
SERVICE CMNT-IMP: ABNORMAL
SERVICE CMNT-IMP: NORMAL
SERVICE CMNT-IMP: NORMAL
SODIUM SERPL-SCNC: 140 MMOL/L (ref 136–145)
SPECIMEN TYPE: ABNORMAL
TOTAL RESP. RATE, ITRR: 22
VENTRICULAR RATE, ECG03: 91 BPM
WBC # BLD AUTO: 6.8 K/UL (ref 4.6–13.2)

## 2019-02-09 PROCEDURE — 74011250637 HC RX REV CODE- 250/637: Performed by: INTERNAL MEDICINE

## 2019-02-09 PROCEDURE — 77030037878 HC DRSG MEPILEX >48IN BORD MOLN -B

## 2019-02-09 PROCEDURE — 82962 GLUCOSE BLOOD TEST: CPT

## 2019-02-09 PROCEDURE — 82803 BLOOD GASES ANY COMBINATION: CPT

## 2019-02-09 PROCEDURE — 83735 ASSAY OF MAGNESIUM: CPT

## 2019-02-09 PROCEDURE — 74011636637 HC RX REV CODE- 636/637: Performed by: PHYSICIAN ASSISTANT

## 2019-02-09 PROCEDURE — 74011000250 HC RX REV CODE- 250

## 2019-02-09 PROCEDURE — 74011250637 HC RX REV CODE- 250/637: Performed by: PHYSICIAN ASSISTANT

## 2019-02-09 PROCEDURE — 93005 ELECTROCARDIOGRAM TRACING: CPT

## 2019-02-09 PROCEDURE — 36600 WITHDRAWAL OF ARTERIAL BLOOD: CPT

## 2019-02-09 PROCEDURE — 80048 BASIC METABOLIC PNL TOTAL CA: CPT

## 2019-02-09 PROCEDURE — 77030018719 HC DRSG PTCH ANTIMIC J&J -A

## 2019-02-09 PROCEDURE — 97161 PT EVAL LOW COMPLEX 20 MIN: CPT

## 2019-02-09 PROCEDURE — 71045 X-RAY EXAM CHEST 1 VIEW: CPT

## 2019-02-09 PROCEDURE — 65620000000 HC RM CCU GENERAL

## 2019-02-09 PROCEDURE — 85027 COMPLETE CBC AUTOMATED: CPT

## 2019-02-09 PROCEDURE — 77030011256 HC DRSG MEPILEX <16IN NO BORD MOLN -A

## 2019-02-09 PROCEDURE — 97116 GAIT TRAINING THERAPY: CPT

## 2019-02-09 PROCEDURE — 74011250636 HC RX REV CODE- 250/636: Performed by: PHYSICIAN ASSISTANT

## 2019-02-09 RX ORDER — METOPROLOL TARTRATE 5 MG/5ML
INJECTION INTRAVENOUS
Status: COMPLETED
Start: 2019-02-09 | End: 2019-02-09

## 2019-02-09 RX ORDER — HYDROCODONE BITARTRATE AND ACETAMINOPHEN 5; 325 MG/1; MG/1
1-2 TABLET ORAL
Status: DISCONTINUED | OUTPATIENT
Start: 2019-02-09 | End: 2019-02-13 | Stop reason: HOSPADM

## 2019-02-09 RX ORDER — INSULIN GLARGINE 100 [IU]/ML
50 INJECTION, SOLUTION SUBCUTANEOUS ONCE
Status: COMPLETED | OUTPATIENT
Start: 2019-02-09 | End: 2019-02-09

## 2019-02-09 RX ORDER — METOPROLOL TARTRATE 5 MG/5ML
5 INJECTION INTRAVENOUS ONCE
Status: COMPLETED | OUTPATIENT
Start: 2019-02-09 | End: 2019-02-09

## 2019-02-09 RX ADMIN — METOPROLOL TARTRATE 5 MG: 5 INJECTION, SOLUTION INTRAVENOUS at 19:43

## 2019-02-09 RX ADMIN — HYDROCODONE BITARTRATE AND ACETAMINOPHEN 2 TABLET: 5; 325 TABLET ORAL at 10:44

## 2019-02-09 RX ADMIN — ATORVASTATIN CALCIUM 40 MG: 40 TABLET, FILM COATED ORAL at 08:38

## 2019-02-09 RX ADMIN — METOPROLOL TARTRATE 5 MG: 5 INJECTION INTRAVENOUS at 19:22

## 2019-02-09 RX ADMIN — CHLORHEXIDINE GLUCONATE 10 ML: 1.2 RINSE ORAL at 08:39

## 2019-02-09 RX ADMIN — HYDROCODONE BITARTRATE AND ACETAMINOPHEN 2 TABLET: 5; 325 TABLET ORAL at 04:22

## 2019-02-09 RX ADMIN — POLYETHYLENE GLYCOL 3350 17 G: 17 POWDER, FOR SOLUTION ORAL at 08:37

## 2019-02-09 RX ADMIN — ASPIRIN 81 MG: 81 TABLET, COATED ORAL at 08:39

## 2019-02-09 RX ADMIN — DOCUSATE SODIUM 100 MG: 100 CAPSULE, LIQUID FILLED ORAL at 18:36

## 2019-02-09 RX ADMIN — PANTOPRAZOLE SODIUM 40 MG: 40 TABLET, DELAYED RELEASE ORAL at 08:00

## 2019-02-09 RX ADMIN — Medication 10 ML: at 21:41

## 2019-02-09 RX ADMIN — INSULIN GLARGINE 50 UNITS: 100 INJECTION, SOLUTION SUBCUTANEOUS at 09:36

## 2019-02-09 RX ADMIN — FENTANYL CITRATE 25 MCG: 50 INJECTION, SOLUTION INTRAMUSCULAR; INTRAVENOUS at 03:13

## 2019-02-09 RX ADMIN — CEFAZOLIN SODIUM 2 G: 2 SOLUTION INTRAVENOUS at 07:05

## 2019-02-09 RX ADMIN — CEFAZOLIN SODIUM 2 G: 2 SOLUTION INTRAVENOUS at 14:25

## 2019-02-09 RX ADMIN — FLUTICASONE PROPIONATE 2 SPRAY: 50 SPRAY, METERED NASAL at 11:00

## 2019-02-09 RX ADMIN — METOPROLOL TARTRATE 12.5 MG: 25 TABLET ORAL at 21:40

## 2019-02-09 RX ADMIN — FENTANYL CITRATE 25 MCG: 50 INJECTION, SOLUTION INTRAMUSCULAR; INTRAVENOUS at 08:56

## 2019-02-09 RX ADMIN — CHLORHEXIDINE GLUCONATE 10 ML: 1.2 RINSE ORAL at 17:08

## 2019-02-09 RX ADMIN — AMIODARONE HYDROCHLORIDE 200 MG: 200 TABLET ORAL at 17:07

## 2019-02-09 RX ADMIN — DOCUSATE SODIUM 100 MG: 100 CAPSULE, LIQUID FILLED ORAL at 08:39

## 2019-02-09 RX ADMIN — HYDROCODONE BITARTRATE AND ACETAMINOPHEN 1 TABLET: 5; 325 TABLET ORAL at 17:07

## 2019-02-09 RX ADMIN — MUPIROCIN: 20 OINTMENT TOPICAL at 19:44

## 2019-02-09 RX ADMIN — AMIODARONE HYDROCHLORIDE 200 MG: 200 TABLET ORAL at 21:40

## 2019-02-09 RX ADMIN — METOPROLOL TARTRATE 5 MG: 5 INJECTION, SOLUTION INTRAVENOUS at 19:22

## 2019-02-09 RX ADMIN — METOPROLOL TARTRATE 5 MG: 5 INJECTION INTRAVENOUS at 19:43

## 2019-02-09 RX ADMIN — AMIODARONE HYDROCHLORIDE 200 MG: 200 TABLET ORAL at 08:39

## 2019-02-09 RX ADMIN — BISACODYL 10 MG: 5 TABLET, COATED ORAL at 08:38

## 2019-02-09 RX ADMIN — MUPIROCIN: 20 OINTMENT TOPICAL at 08:40

## 2019-02-09 RX ADMIN — METOPROLOL TARTRATE 12.5 MG: 25 TABLET ORAL at 08:39

## 2019-02-09 NOTE — PROGRESS NOTES
CARDIAC SURGERY PROGRESS NOTE 
 
2019, 10:16 AM 
  
Post Operative Day # 1 Chart reviewed. Interval History/Events of Past 24 hours:  
Extubated yesterday afternoon on 2L NC. No vasoactive meds. Subjective: 
Patient seen and examined on rounds today. Pain level: controlled currently Objective: 
Vital signs:  
Visit Vitals /87 Pulse 90 Temp 98.9 °F (37.2 °C) Resp 21 Ht 6' (1.829 m) Wt 97 kg (213 lb 14.4 oz) SpO2 97% BMI 29.01 kg/m² Temp (24hrs), Av.1 °F (37.3 °C), Min:97.9 °F (36.6 °C), Max:100.8 °F (38.2 °C) Admission Weight: Last Weight Weight: 97.5 kg (215 lb) Weight: 97 kg (213 lb 14.4 oz) Last 3 Recorded Weights in this Encounter 19 6405 19 0708 19 0600 Weight: 94.7 kg (208 lb 12.8 oz) 91.1 kg (200 lb 14.4 oz) 97 kg (213 lb 14.4 oz) Telemetry: Apaced Physical Examination:    
General:  Alert, oriented Lungs: Clear to ascultation without rales, wheezes or rhonchi. Chest: Dressings clean and dry. Sternum stable. Heart: regular rate and rhythm, No murmur. Abdomen: Soft and non-tender without masses. Bowel sounds present. Extremities: Warm and well perfused. Edema absent. Incisions: clean and dry Neuro: No deficit. Beta-blocker: Yes ASA: Yes  
Statin: Yes ACE-I: No 
Diuretic: No  
 
SUP Prophylaxis: Pepcid DVT Prophylaxis:  
pneumatic compression boots: Yes Compression stockings:  Yes Enoxaparin:  No 
 
Chest tubes:  420ml since OR Pacing wires:  present DME needs:  tbd Labs: 
Lab Results Component Value Date/Time WBC 6.8 2019 04:00 AM  
 HCT 34.0 (L) 2019 04:00 AM  
  2019 04:00 AM  
  
Lab Results Component Value Date/Time   2019 04:00 AM  
 K 4.1 2019 04:00 AM  
  (H) 2019 04:00 AM  
 CO2 24 2019 04:00 AM  
  (H) 2019 04:00 AM  
 BUN 16 2019 04:00 AM  
 CREA 1.05 2019 04:00 AM  
 CREA 1.29 02/08/2019 01:40 PM  
 CREA 1.49 (H) 02/07/2019 06:25 PM  
 
Lab Results Component Value Date/Time HBA1C 7.0 (H) 02/03/2019 01:42 AM  
 
pH (POC) Date Value Ref Range Status 02/09/2019 7. 381 7.35 - 7.45   Final  
 
pCO2 (POC) Date Value Ref Range Status 02/09/2019 36.9 35.0 - 45.0 MMHG Final  
 
pO2 (POC) Date Value Ref Range Status 02/09/2019 97 80 - 100 MMHG Final  
 
HCO3 (POC) Date Value Ref Range Status 02/09/2019 21.6 (L) 22 - 26 MMOL/L Final  
 
sO2 (POC) Date Value Ref Range Status 02/09/2019 97 92 - 97 % Final  
 
Base excess (POC) Date Value Ref Range Status 02/08/2019 0 mmol/L Final  
 
    
EKG: NSR inc RBBB 
 
CXR: t/l ok, no PTX or effusion Assessment: 
CAD S/P  CABG x 4 Respiratory parameters stable Cardiac status stable Plans: 1. Resume BB, ASA, statin 2. Lantus 3.  d/c jessee hernandez 4. wean oxygen 5. Increase PO pain meds to q4h Heart Hugger use encouraged to mitigate pain and will also assist with deep breathing and incentive spirometry goals. Possible discharge 3 days. Courtney Newman PA-C CVTS 
02/09/19, 10:16 AM 
 
 
 
ATTENDING SURGEON NOTE 
 
POD # 1 I saw and evaluated Evelyn Rutledge. on rounds today. He is resting comfortably in bed and is feeling well. He was extubated last night without difficulty and is not short of breath presently. Hemodynamically stable overnight, on no hemodynamic support. His incisional pain is under good control. He did sleep well last night. VS stable. Lungs are clear to auscultation bilaterally, without wheezes and without rhonchi. The heart rate and rhythm were regular with occasional extra beats. Pericardial rub mild. The abdomen was soft, with bowel sounds not present. The sternal wound dressing was dry and intact. Urine output adequate. His oxygen saturation was 96% on 2 liters of oxygen via nasal cannula. Hct 34 K+ 4.1 
creat 1 
 
 The CXR images, which I personally reviewed, showed clear lungs bilaterally, without a pneumothorax. The EKG demonstrates sinus rhythm with no ischemia. He is progressing well. Plans for today include: 
Ambulate at bedside and in simpson. Advance oral intake as tolerated. Wean off oxygen. Remove chest tubes and Zaidi catheter (done). Patient verbalizes understanding and agreement with the plan. I spoke with his spouse yesterday to provide an update on his condition.  
 
Fahad Mccracken MD

## 2019-02-09 NOTE — PROGRESS NOTES
NUTRITION Nutrition Consult: General Nutrition Management & Supplements RECOMMENDATIONS / PLAN:  
 
- Add diabetic restriction to diet. - Follow up to provide diet education prior to discharge. - Continue RD inpatient monitoring and evaluation. NUTRITION INTERVENTIONS & DIAGNOSIS:  
 
[x] Meals/snacks: modify composition 
[x] Nutrition counseling/education: cardiac, diabetic diet education planned Nutrition Diagnosis: Nutrition related knowledge deficit related to hx of CAD and DM as evidenced by pt in need of cardiac, diabetic diet education. ASSESSMENT:  
 
S/p CABG x 4 yesterday, tolerating clears and advanced to cardiac diet- hx of diabetes noted. Having chest tubes removed at time of visit. Average po intake adequate to meet patients estimated nutritional needs:   [] Yes     [] No   [x] Unable to determine at this time Diet: DIET CARDIAC Regular Food Allergies: NKFA Current Appetite:   [] Good     [x] Fair     [] Poor     [] Other: 
Appetite/meal intake prior to admission:   [] Good     [] Fair     [] Poor     [x] Other: unknown Feeding Limitations:  [] Swallowing difficulty    [] Chewing difficulty    [] Other: 
Current Meal Intake:  
Patient Vitals for the past 100 hrs: 
 % Diet Eaten 02/07/19 1728 100 % 02/07/19 1247 100 % 02/07/19 0856 100 % 02/06/19 1730 100 % 02/06/19 1346 100 % 02/06/19 0917 100 % 02/05/19 1802 100 % 02/05/19 1247 100 % BM: 2/6 Skin Integrity: surgical incisions to sternum and leg Edema:   [x] No     [] Yes Pertinent Medications: Reviewed Recent Labs 02/09/19 
0400 02/08/19 
1340 02/07/19 
1825  137 137  
K 4.1 4.0 3.8 * 106 103 CO2 24 24 27 * 187* 218* BUN 16 18 23* CREA 1.05 1.29 1.49* CA 7.6* 7.5* 8.4* MG 2.2 2.6  -- Intake/Output Summary (Last 24 hours) at 2/9/2019 1529 Last data filed at 2/9/2019 1300 Gross per 24 hour Intake 1698.92 ml Output 1695 ml Net 3.92 ml Anthropometrics: 
Ht Readings from Last 1 Encounters:  
02/04/19 6' (1.829 m) Last 3 Recorded Weights in this Encounter 02/06/19 8773 02/08/19 0708 02/09/19 0600 Weight: 94.7 kg (208 lb 12.8 oz) 91.1 kg (200 lb 14.4 oz) 97 kg (213 lb 14.4 oz) Body mass index is 29.01 kg/m². Weight History: weight gain per chart hx review Weight Metrics 2/9/2019 12/17/2018 11/25/2015 11/12/2015 11/9/2015 10/11/2015 9/9/2015 Weight 213 lb 14.4 oz 210 lb 189 lb 189 lb 190 lb 190 lb 190 lb BMI 29.01 kg/m2 29.29 kg/m2 26.37 kg/m2 26.37 kg/m2 25.76 kg/m2 25.76 kg/m2 26.51 kg/m2 Admitting Diagnosis: Chest pain Chest pain Chest pain Pertinent PMHx: DM, HTN Education Needs:        [] None identified  [x] Identified - Not appropriate at this time  []  Identified and addressed - refer to education log Learning Limitations:   [x] None identified  [] Identified Cultural, Temple & ethnic food preferences:  [x] None identified    [] Identified and addressed ESTIMATED NUTRITION NEEDS:  
 
Calories: 7478-9394 kcal (MSJx1.2-1.3) based on  [x] Actual BW 95 kg   [] IBW Protein:  gm (0.8-1.2 gm/kg) based on  [x] Actual BW      [] IBW Fluid: 1 mL/kcal 
  
MONITORING & EVALUATION:  
 
Nutrition Goal(s): 1. Po intake of meals will meet >75% of patient estimated nutritional needs within the next 7 days. Outcome:  [] Met/Ongoing    []  Not Met    [x] New/Initial Goal  
 
Monitoring:   [x] Food and beverage intake   [x] Diet order   [x] Nutrition-focused physical findings   [x] Treatment/therapy   [] Weight   [] Enteral nutrition intake Previous Recommendations (for follow-up assessments only):     []   Implemented       []   Not Implemented (RD to address)      [] No Longer Appropriate     [] No Recommendation Made Discharge Planning: cardiac, diabetic diet [x] Participated in care planning, discharge planning, & interdisciplinary rounds as appropriate Rajat Norton RD, [facility] Pager: [phone]

## 2019-02-09 NOTE — ROUTINE PROCESS
1945 SBAR received from off going shift, initial assessment completed. States pan is much improved. [facility] updated on status, 11beat run Didatuans PA aware 
0700 SBAR to oncoming shift, med x3 for pain , destiny d/ozzie, insulin drip remains on, up to chair manjeet well. pcxray labs and ekg completed.

## 2019-02-09 NOTE — PROGRESS NOTES
[facility]  -  Progress Note Patient: Ken Santo. MRN: [id number]  SSN: [id number] YOB: 1945  Age: 72 y.o. Sex: male Admit Date: 2/3/2019 Assessment:  
 
Hospital Problems  Date Reviewed: 2/6/2019 Codes Class Noted POA * (Principal) [facility]STEM[facility] (non-ST elevated [facility]) [facility] ICD-10-CM: I21.4 ICD-9-CM: 410.70  2/6/2019 Yes Chest pain ICD-10-CM: R07.9 ICD-9-CM: 786.50  2/3/2019 Unknown  
   
  
  
-CAD s/p CABG X5 (LIMA to LAD, SVG to Diag1/OM1, SVG to dRCA/PLB) 2/8/2019.  
-NSTEMI- with peak troponin at 9.  
-Normal EF 60% by echo this admission. 
-Acute bronchitis- on abx -HTN- elevated on examination but says he has been stable at home. 
-DM Type II on oral meds 
-Dyslipidemia- on statin 
-Renal insult 
  
-No primary cardiologist, Dr. Lam Levo PMD 
 
Plan: 1. Continue aspirin, low dose betablocker, Lipitor. 2. Continue routine post op amiodarone. 3. Good early post op course. Subjective: No new complaints except for routine post op musculoskeletal chest pain. Objective:  
  
Patient Vitals for the past 8 hrs: 
 Temp Pulse Resp BP SpO2  
02/09/19 0700  (!) 106 20 118/74 95 % 02/09/19 0600  (!) 101 16 127/69 96 % 02/09/19 0500  90 18 125/74 95 % 02/09/19 0400 (!) 100.8 °F (38.2 °C) (!) 103 17 123/58 95 % 02/09/19 0300  98 23 134/82 96 % 02/09/19 0200  90 15 131/75 96 % 02/09/19 0100  90 15 115/74 97 % 02/09/19 0001 99.5 °F (37.5 °C) 90 14 110/76 97 % Patient Vitals for the past 96 hrs: 
 Weight  
02/09/19 0600 97 kg (213 lb 14.4 oz) 02/08/19 0708 91.1 kg (200 lb 14.4 oz) 02/06/19 0614 94.7 kg (208 lb 12.8 oz) Intake/Output Summary (Last 24 hours) at 2/9/2019 4513 Last data filed at 2/9/2019 4087 Gross per 24 hour Intake 3725.33 ml Output 3180 ml Net 545.33 ml Physical Exam: 
General:  alert, cooperative, no distress, appears stated age Neck:  no JVD Lungs: Few bibasilar rales otherwise clear to auscultation bilaterally Heart:  regular rate and rhythm, S1, S2 normal, no murmur, click, rub or gallop Abdomen:  abdomen is soft without significant tenderness, masses, organomegaly or guarding Extremities:  extremities normal, atraumatic, no cyanosis or edema. Left leg wrapped in ACE bandage due to vein harvesting. Data Review:  
 
Labs: Results:  
   
Chemistry Recent Labs 02/09/19 
0400 02/08/19 
1340 02/07/19 
1825 * 187* 218*  137 137  
K 4.1 4.0 3.8 * 106 103 CO2 24 24 27 BUN 16 18 23* CREA 1.05 1.29 1.49* CA 7.6* 7.5* 8.4* MG 2.2 2.6  --   
AGAP 7 7 7 BUCR 15 14 15 CBC w/Diff Recent Labs 02/09/19 
0400 02/08/19 
1340 02/08/19 
0543 WBC 6.8 9.7 3.9*  
RBC 3.97* 4.06* 4.44* HGB 11.6* 11.9* 12.8* HCT 34.0* 34.3* 38.2  139 163 GRANS  --  58  --   
LYMPH  --  20  --   
EOS  --  3  --   
  
Cardiac Enzymes No results found for: CPK, CK, CKMMB, CKMB, RCK3, CKMBT, CKNDX, CKND1, CANDI, TROPT, TROIQ, GERMAINE, TROPT, TNIPOC, BNP, BNPP Coagulation Recent Labs 02/08/19 
1340 PTP 15.2 INR 1.2 APTT 30.1 Lipid Panel Lab Results Component Value Date/Time Cholesterol, total 100 02/04/2019 02:15 AM  
 HDL Cholesterol 50 02/04/2019 02:15 AM  
 LDL, calculated 33.8 02/04/2019 02:15 AM  
 VLDL, calculated 16.2 02/04/2019 02:15 AM  
 Triglyceride 81 02/04/2019 02:15 AM  
 CHOL/HDL Ratio 2.0 02/04/2019 02:15 AM  
  
BNP No results found for: BNP, BNPP, XBNPT Liver Enzymes No results for input(s): TP, ALB, TBIL, AP, SGOT, GPT in the last 72 hours. No lab exists for component: DBIL Digoxin Thyroid Studies Lab Results Component Value Date/Time TSH 0.45 02/03/2019 03:57 PM  
    
 
Socorro Dougherty DO February 9, 2019

## 2019-02-10 LAB
ADMINISTERED INITIALS, ADMINIT: NORMAL
ADMINISTERED INITIALS, ADMINIT: NORMAL
ATRIAL RATE: 97 BPM
BACTERIA SPEC CULT: NORMAL
BACTERIA SPEC CULT: NORMAL
CALCULATED P AXIS, ECG09: 43 DEGREES
CALCULATED R AXIS, ECG10: -49 DEGREES
CALCULATED T AXIS, ECG11: 23 DEGREES
D50 ADMINISTERED, D50ADM: 0 ML
D50 ADMINISTERED, D50ADM: 0 ML
D50 ORDER, D50ORD: 0 ML
D50 ORDER, D50ORD: 0 ML
DIAGNOSIS, 93000: NORMAL
GLUCOSE BLD STRIP.AUTO-MCNC: 120 MG/DL (ref 70–110)
GLUCOSE BLD STRIP.AUTO-MCNC: 153 MG/DL (ref 70–110)
GLUCOSE BLD STRIP.AUTO-MCNC: 191 MG/DL (ref 70–110)
GLUCOSE BLD STRIP.AUTO-MCNC: 234 MG/DL (ref 70–110)
GLUCOSE BLD STRIP.AUTO-MCNC: 83 MG/DL (ref 70–110)
GLUCOSE BLD STRIP.AUTO-MCNC: 94 MG/DL (ref 70–110)
GLUCOSE, GLC: 82 MG/DL
GLUCOSE, GLC: 94 MG/DL
HIGH TARGET, HITG: 150 MG/DL
HIGH TARGET, HITG: 150 MG/DL
INSULIN ADMINSTERED, INSADM: 0.8 UNITS/HOUR
INSULIN ADMINSTERED, INSADM: 1.3 UNITS/HOUR
INSULIN ORDER, INSORD: 0.8 UNITS/HOUR
INSULIN ORDER, INSORD: 1.3 UNITS/HOUR
LOW TARGET, LOT: 80 MG/DL
LOW TARGET, LOT: 80 MG/DL
MINUTES UNTIL NEXT BG, NBG: 60 MIN
MINUTES UNTIL NEXT BG, NBG: 60 MIN
MULTIPLIER, MUL: 0.04
MULTIPLIER, MUL: 0.04
ORDER INITIALS, ORDINIT: NORMAL
ORDER INITIALS, ORDINIT: NORMAL
P-R INTERVAL, ECG05: 142 MS
Q-T INTERVAL, ECG07: 370 MS
QRS DURATION, ECG06: 104 MS
QTC CALCULATION (BEZET), ECG08: 469 MS
SERVICE CMNT-IMP: NORMAL
SERVICE CMNT-IMP: NORMAL
VENTRICULAR RATE, ECG03: 97 BPM

## 2019-02-10 PROCEDURE — 82962 GLUCOSE BLOOD TEST: CPT

## 2019-02-10 PROCEDURE — 74011250637 HC RX REV CODE- 250/637: Performed by: PHYSICIAN ASSISTANT

## 2019-02-10 PROCEDURE — 93005 ELECTROCARDIOGRAM TRACING: CPT

## 2019-02-10 PROCEDURE — 97116 GAIT TRAINING THERAPY: CPT

## 2019-02-10 PROCEDURE — 97110 THERAPEUTIC EXERCISES: CPT

## 2019-02-10 PROCEDURE — 65620000000 HC RM CCU GENERAL

## 2019-02-10 PROCEDURE — 74011250637 HC RX REV CODE- 250/637: Performed by: INTERNAL MEDICINE

## 2019-02-10 PROCEDURE — 74011250636 HC RX REV CODE- 250/636: Performed by: PHYSICIAN ASSISTANT

## 2019-02-10 PROCEDURE — 74011000250 HC RX REV CODE- 250: Performed by: PHYSICIAN ASSISTANT

## 2019-02-10 PROCEDURE — 74011250637 HC RX REV CODE- 250/637: Performed by: THORACIC SURGERY (CARDIOTHORACIC VASCULAR SURGERY)

## 2019-02-10 PROCEDURE — 74011636637 HC RX REV CODE- 636/637: Performed by: PHYSICIAN ASSISTANT

## 2019-02-10 RX ORDER — INSULIN LISPRO 100 [IU]/ML
INJECTION, SOLUTION INTRAVENOUS; SUBCUTANEOUS
Status: DISCONTINUED | OUTPATIENT
Start: 2019-02-10 | End: 2019-02-13 | Stop reason: HOSPADM

## 2019-02-10 RX ORDER — INSULIN GLARGINE 100 [IU]/ML
10 INJECTION, SOLUTION SUBCUTANEOUS
Status: DISCONTINUED | OUTPATIENT
Start: 2019-02-10 | End: 2019-02-11

## 2019-02-10 RX ORDER — ALBUTEROL SULFATE 90 UG/1
2 AEROSOL, METERED RESPIRATORY (INHALATION)
COMMUNITY
Start: 2018-10-16 | End: 2021-02-05

## 2019-02-10 RX ORDER — BISMUTH SUBSALICYLATE 262 MG
1 TABLET,CHEWABLE ORAL DAILY
COMMUNITY

## 2019-02-10 RX ORDER — POTASSIUM CHLORIDE 1.5 G/1.77G
20 POWDER, FOR SOLUTION ORAL DAILY
Status: DISCONTINUED | OUTPATIENT
Start: 2019-02-10 | End: 2019-02-11

## 2019-02-10 RX ORDER — METOPROLOL TARTRATE 25 MG/1
25 TABLET, FILM COATED ORAL EVERY 12 HOURS
Status: DISCONTINUED | OUTPATIENT
Start: 2019-02-10 | End: 2019-02-13 | Stop reason: HOSPADM

## 2019-02-10 RX ORDER — MAGNESIUM SULFATE 100 %
4 CRYSTALS MISCELLANEOUS AS NEEDED
Status: DISCONTINUED | OUTPATIENT
Start: 2019-02-10 | End: 2019-02-13 | Stop reason: HOSPADM

## 2019-02-10 RX ORDER — DEXTROSE 50 % IN WATER (D50W) INTRAVENOUS SYRINGE
25-50 AS NEEDED
Status: DISCONTINUED | OUTPATIENT
Start: 2019-02-10 | End: 2019-02-13 | Stop reason: HOSPADM

## 2019-02-10 RX ORDER — FUROSEMIDE 40 MG/1
40 TABLET ORAL DAILY
Status: DISCONTINUED | OUTPATIENT
Start: 2019-02-10 | End: 2019-02-13 | Stop reason: HOSPADM

## 2019-02-10 RX ORDER — METOPROLOL TARTRATE 5 MG/5ML
5 INJECTION INTRAVENOUS AS NEEDED
Status: DISCONTINUED | OUTPATIENT
Start: 2019-02-10 | End: 2019-02-13 | Stop reason: HOSPADM

## 2019-02-10 RX ORDER — FLUTICASONE PROPIONATE 50 MCG
1 SPRAY, SUSPENSION (ML) NASAL 2 TIMES DAILY
COMMUNITY
Start: 2019-01-21

## 2019-02-10 RX ADMIN — INSULIN LISPRO 6 UNITS: 100 INJECTION, SOLUTION INTRAVENOUS; SUBCUTANEOUS at 21:40

## 2019-02-10 RX ADMIN — METOPROLOL TARTRATE 5 MG: 1 INJECTION, SOLUTION INTRAVENOUS at 01:41

## 2019-02-10 RX ADMIN — ATORVASTATIN CALCIUM 40 MG: 40 TABLET, FILM COATED ORAL at 08:49

## 2019-02-10 RX ADMIN — METOPROLOL TARTRATE 25 MG: 25 TABLET ORAL at 21:40

## 2019-02-10 RX ADMIN — BISACODYL 10 MG: 5 TABLET, COATED ORAL at 08:49

## 2019-02-10 RX ADMIN — POLYETHYLENE GLYCOL 3350 17 G: 17 POWDER, FOR SOLUTION ORAL at 08:49

## 2019-02-10 RX ADMIN — AMIODARONE HYDROCHLORIDE 200 MG: 200 TABLET ORAL at 21:40

## 2019-02-10 RX ADMIN — METOPROLOL TARTRATE 5 MG: 1 INJECTION, SOLUTION INTRAVENOUS at 16:37

## 2019-02-10 RX ADMIN — DOCUSATE SODIUM 100 MG: 100 CAPSULE, LIQUID FILLED ORAL at 18:17

## 2019-02-10 RX ADMIN — Medication 10 ML: at 07:30

## 2019-02-10 RX ADMIN — Medication 10 ML: at 22:00

## 2019-02-10 RX ADMIN — METOPROLOL TARTRATE 5 MG: 1 INJECTION, SOLUTION INTRAVENOUS at 00:51

## 2019-02-10 RX ADMIN — ENOXAPARIN SODIUM 40 MG: 40 INJECTION SUBCUTANEOUS at 18:17

## 2019-02-10 RX ADMIN — METOPROLOL TARTRATE 25 MG: 25 TABLET ORAL at 08:49

## 2019-02-10 RX ADMIN — INSULIN LISPRO 3 UNITS: 100 INJECTION, SOLUTION INTRAVENOUS; SUBCUTANEOUS at 16:23

## 2019-02-10 RX ADMIN — CHLORHEXIDINE GLUCONATE 10 ML: 1.2 RINSE ORAL at 18:17

## 2019-02-10 RX ADMIN — ASPIRIN 81 MG: 81 TABLET, COATED ORAL at 08:49

## 2019-02-10 RX ADMIN — CHLORHEXIDINE GLUCONATE 10 ML: 1.2 RINSE ORAL at 08:50

## 2019-02-10 RX ADMIN — FLUTICASONE PROPIONATE 2 SPRAY: 50 SPRAY, METERED NASAL at 18:17

## 2019-02-10 RX ADMIN — POTASSIUM CHLORIDE 20 MEQ: 1.5 POWDER, FOR SOLUTION ORAL at 11:46

## 2019-02-10 RX ADMIN — FUROSEMIDE 40 MG: 40 TABLET ORAL at 11:46

## 2019-02-10 RX ADMIN — AMIODARONE HYDROCHLORIDE 200 MG: 200 TABLET ORAL at 08:49

## 2019-02-10 RX ADMIN — Medication 10 ML: at 13:20

## 2019-02-10 RX ADMIN — INSULIN GLARGINE 10 UNITS: 100 INJECTION, SOLUTION SUBCUTANEOUS at 21:40

## 2019-02-10 RX ADMIN — DOCUSATE SODIUM 100 MG: 100 CAPSULE, LIQUID FILLED ORAL at 08:49

## 2019-02-10 RX ADMIN — MUPIROCIN: 20 OINTMENT TOPICAL at 18:18

## 2019-02-10 RX ADMIN — FLUTICASONE PROPIONATE 2 SPRAY: 50 SPRAY, METERED NASAL at 08:50

## 2019-02-10 RX ADMIN — AMIODARONE HYDROCHLORIDE 200 MG: 200 TABLET ORAL at 16:23

## 2019-02-10 RX ADMIN — PANTOPRAZOLE SODIUM 40 MG: 40 TABLET, DELAYED RELEASE ORAL at 07:58

## 2019-02-10 RX ADMIN — MUPIROCIN: 20 OINTMENT TOPICAL at 08:50

## 2019-02-10 NOTE — PROGRESS NOTES
Pt recently returned from a walk in hallway, returned to bed. Pt stated that he \"felt the best I have felt since my surgery\". Shortly thereafter pts' monitor alarmed, pt noted to be in an SVT rhythm with a ventricular response rate of 155bpm. EKG obtained. Pt was asymptomatic, /68 in NSR with O2 saturation of 94% on 3LPM nasal cannula and in SVT /77 with similiar O2 saturation on 3LPM NC. Juan Pablo Amin contacted and ordered Lopressor 5mg IV now with a repeat if HR persisted. Initial dose of Lopressor without change in HR or rhythm. Second Lopressor 5mg IV with eventual conversion to NSR with frequent PAC's. EK obtained after pt converted to NSR, JOSE Zhou notified of pts' conversion to NSR without further orders.

## 2019-02-10 NOTE — PROGRESS NOTES
Problem: Mobility Impaired (Adult and Pediatric) Goal: *Acute Goals and Plan of Care (Insert Text) Physical Therapy Goals Initiated 2/9/2019 and to be accomplished within 7 day(s) 1. Patient will move from supine to sit and sit to supine , scoot up and down and roll side to side in bed with modified independence. 2.  Patient will transfer from bed to chair and chair to bed with modified independence using the least restrictive device. 3.  Patient will perform sit to stand with modified independence. 4.  Patient will ambulate with modified independence for >250 feet with the least restrictive device. 5.  Patient will ascend/descend 4 stairs with 1 handrail(s) with supervision/set-up. Outcome: Progressing Towards Goal 
physical Therapy TREATMENT Patient: Bard Ndiaye (77 y.o. male) Date: 2/10/2019 Diagnosis: Chest pain Chest pain Chest pain NSTEMI (non-ST elevated myocardial infarction) ([facility] Procedure(s) (LRB): 
CORONARY ARTERY BYPASS GRAFT (CABG) TIMES FIVE, LEFT ENDOSCOPIC SAPHENOUS VEIN GRAFT HARVEST AND LEFT INTERNAL MAMMARY HARVEST/STERNALOK (N/A) ESOPHAGEAL TRANS ECHOCARDIOGRAM (N/A) 2 Days Post-Op Precautions:    
Chart, physical therapy assessment, plan of care and goals were reviewed. OBJECTIVE/ ASSESSMENT: 
Nursing cleared pt to participate with PT. Patient found seated in recliner chair willing to work with PT. Pt was able to recall 50% of sternal precautions this tx. Pt stood from recliner chair with VC's to grab heart hugger. Pt then ambulated into hallway on 2L of portable 02 for 350ft. Pt was given VC's for activity pacing and breathing techniques throughout gait. Pt's HR ranged from 105-114BPM throughout gait but is able to come back down to 90's at rest. Pt returned to recliner chair in room and required VC's for safety to grab heart hugger and not to pull back on unlocked rollator. Pt then performed seated therex (see below). Pt was positioned to comfort in recliner chair and was left seated with all needs in reach, scd's donned, wife present, and nursing notified. Education: 
[]         Bed mobility [x]         Transfers 
[x]         Ambulation / gait [x]         Assistive device management 
[]         Stairs [x]         Body mechanics [x]         Position change  
[x]         Therapeutic exercise [x]         Activity pacing / energy conservation 
[x]         Other: Sternal precautions Progression toward goals: 
[x]      Improving appropriately and progressing toward goals 
[]      Improving slowly and progressing toward goals 
[]      Not making progress toward goals and plan of care will be adjusted PLAN: 
Patient continues to benefit from skilled intervention to address the above impairments. Continue treatment per established plan of care. Discharge Recommendations:  Home Health Further Equipment Recommendations for Discharge: rollator SUBJECTIVE:  
Patient stated I just feel Bertell Simone OBJECTIVE DATA SUMMARY:  
Functional Mobility Training: 
Transfers: 
Sit to Stand: Stand-by assistance Stand to Sit: Stand-by assistance Balance: 
Sitting: Intact Standing: Impaired; With support Standing - Static: Good Standing - Dynamic : FairAmbulation/Gait Training: 
Distance (ft): 350 Feet (ft) Assistive Device: Walker, rolling Ambulation - Level of Assistance: Stand-by assistance Gait Abnormalities: Decreased step clearance Speed/Anahy: Pace decreased (<100 feet/min) Step Length: Right shortened;Left shortened Interventions: Verbal cues; Safety awareness training Therapeutic Exercises:  
 
 
EXERCISE Sets Reps Active Active Assist  
Passive Self- assited ROM Comments Ankle Pumps 1 10 [x] [] [] [] Quad Sets   [] [] [] [] Glut Sets   [] [] [] [] Short Arc Quads   [] [] [] [] Heel Slides   [] [] [] [] Straight Leg Raises   [] [] [] [] Hip Abd   [] [] [] [] Long Arc Quads 1 10 [] [] [] [] Seated Marching   [] [] [] [] Seated Knee Flexion   [] [] [] []   
Standing Marching   [] [] [] []   
 
Pain: 
Pre tx pain:  0/10Post tx pain: 0/10Activity Tolerance:  
fair Please refer to the flowsheet for vital signs taken during this treatment. After treatment:  
[x] Patient left in no apparent distress sitting up in chair 
[] Patient left in no apparent distress in bed 
[x] Call bell left within reach [x] Nursing notified 
[x] Wife present 
[] Bed alarm activated 
[x] SCDs applied 
[] Ice applied Adaline Olszewski, PTA Time Calculation: 24 mins

## 2019-02-10 NOTE — PROGRESS NOTES
Neuro intact. Denies pain this shift. TMAX 99.7. Pt in ST at beginning of shift which converted to NSR after lopressor 5mg x2 (see day shift note). At around 0032, pt sustaining -150s again. Curt Man. PA notified and orders given for 5mg IV lopressor PRN x4 doses. Administered at 0051 x1 and 0141 x1 for sustained -150s--conversion to NSR 80-90s after each dose. Pt asymptomatic during these events with BP WNL. Order also given to increase PO metoprolol to 25 mg. RIJ cordis d/c, 2nd IV placed. Remained on 3L overnight, encouraged to use ICS Qhr when awake. Progressed to 1000 on ICS this shift. No GI issues, per pt he is passing flatus now. No BM this shift.  WNL, 600cc total this shift ACE wraps taken down and harvest site CARRIE. Midsternal dsg changed. Incision well approximated, no signs of infection. Insulin drip d/c at  0157.

## 2019-02-10 NOTE — PROGRESS NOTES
[facility]  -  Progress Note Patient: Camille Chapman. MRN: [id number]  SSN: [id number] YOB: 1945  Age: 72 y.o. Sex: male Admit Date: 2/3/2019 Assessment:  
 
Hospital Problems  Date Reviewed: 2/6/2019 Codes Class Noted POA * (Principal) [facility] ICD-10-CM: I21.4 ICD-9-CM: 410.70  2/6/2019 Yes Chest pain ICD-10-CM: R07.9 ICD-9-CM: 786.50  2/3/2019 Unknown  
   
  
  
-CAD s/p CABG X5 (LIMA to LAD, SVG to Diag1/OM1, SVG to dRCA/PLB) 2/8/2019.  
-NSTEMI- with peak troponin at 9.  
-Normal EF 60% by echo this admission. 
-Acute bronchitis- on abx -HTN- elevated on examination but says he has been stable at home. 
-DM Type II on oral meds 
-Dyslipidemia- on statin 
-Renal insult 
  
-No primary cardiologist, Dr. Deleon Friends PMD 
 
 
 
Plan: 1. Continue aspirin and statin. 2. Agree with increase in Lopressor to 25 mg twice daily. 3. Continue amiodarone orally per routine post op. 4. Good post op course thus far. Subjective: No new complaints. He did have atrial flutter with 2 to 1 block during the night breaking with IV Lopressor. Objective:  
  
Patient Vitals for the past 8 hrs: 
 Temp Pulse Resp BP SpO2  
02/10/19 0849  95  128/70   
02/10/19 0730 99.5 °F (37.5 °C) 89 20 126/67 95 % 02/10/19 0700  87 22 126/67 97 % 02/10/19 0630  89 20 125/71 92 % 02/10/19 0500  91 20 120/70 96 % 02/10/19 0400 98.9 °F (37.2 °C) 89 22 122/64 95 % 02/10/19 0329  92 18 118/52 97 % 02/10/19 0200  82 20 105/62 97 % 02/10/19 0141  (!) 151    Patient Vitals for the past 96 hrs: 
 Weight  
02/10/19 0630 97.4 kg (214 lb 12.8 oz) 02/09/19 0600 97 kg (213 lb 14.4 oz) 02/08/19 0708 91.1 kg (200 lb 14.4 oz) Intake/Output Summary (Last 24 hours) at 2/10/2019 8253 Last data filed at 2/10/2019 6351 Gross per 24 hour Intake 1438.01 ml Output 1360 ml Net 78.01 ml Physical Exam: 
General:  alert, cooperative, no distress, appears stated age Neck:  no JVD Lungs:  clear to auscultation bilaterally Heart:  regular rate and rhythm, S1, S2 normal, no murmur, click, rub or gallop Abdomen:  abdomen is soft without significant tenderness, masses, organomegaly or guarding Extremities:  extremities normal, atraumatic, no cyanosis or edema Data Review:  
 
Labs: Results:  
   
Chemistry Recent Labs 02/09/19 
0400 02/08/19 
1340 02/07/19 
1825 * 187* 218*  137 137  
K 4.1 4.0 3.8 * 106 103 CO2 24 24 27 BUN 16 18 23* CREA 1.05 1.29 1.49* CA 7.6* 7.5* 8.4* MG 2.2 2.6  --   
AGAP 7 7 7 BUCR 15 14 15 CBC w/Diff Recent Labs 02/09/19 
0400 02/08/19 
1340 02/08/19 
0543 WBC 6.8 9.7 3.9*  
RBC 3.97* 4.06* 4.44* HGB 11.6* 11.9* 12.8* HCT 34.0* 34.3* 38.2  139 163 GRANS  --  58  --   
LYMPH  --  20  --   
EOS  --  3  --   
  
Cardiac Enzymes No results found for: CPK, CK, CKMMB, CKMB, RCK3, CKMBT, CKNDX, CKND1, CANDI, TROPT, TROIQ, GERMAINE, TROPT, TNIPOC, BNP, BNPP Coagulation Recent Labs 02/08/19 
1340 PTP 15.2 INR 1.2 APTT 30.1 Lipid Panel Lab Results Component Value Date/Time Cholesterol, total 100 02/04/2019 02:15 AM  
 HDL Cholesterol 50 02/04/2019 02:15 AM  
 LDL, calculated 33.8 02/04/2019 02:15 AM  
 VLDL, calculated 16.2 02/04/2019 02:15 AM  
 Triglyceride 81 02/04/2019 02:15 AM  
 CHOL/HDL Ratio 2.0 02/04/2019 02:15 AM  
  
BNP No results found for: BNP, BNPP, XBNPT Liver Enzymes No results for input(s): TP, ALB, TBIL, AP, SGOT, GPT in the last 72 hours. No lab exists for component: DBIL Digoxin Thyroid Studies Lab Results Component Value Date/Time TSH 0.45 02/03/2019 03:57 PM  
    
 
Meena Salmon DO February 10, 2019

## 2019-02-10 NOTE — PROGRESS NOTES
Problem: Falls - Risk of 
Goal: *Absence of Falls Document Mary Kate Patel Fall Risk and appropriate interventions in the flowsheet. Fall Risk Interventions: 
Mobility Interventions: Bed/chair exit alarm, Communicate number of staff needed for ambulation/transfer, Patient to call before getting OOB Medication Interventions: Evaluate medications/consider consulting pharmacy, Patient to call before getting OOB, Teach patient to arise slowly History of Falls Interventions: Bed/chair exit alarm, Door open when patient unattended, Room close to nurse's station Comments: Pt will remain free from falls by calling for assistance before ambulating.

## 2019-02-10 NOTE — PROGRESS NOTES
@ 4058-1681 Pt using ICS, started coughing and Heart rate increased to 140's SVT sustained. PRN Lopressor given per order, HR decreased to 110's briefly. VSS stable throughout event, no lightheadedness, dizziness or heart palpations felt per patient report. HR  Back to 140-150's EKG performed, upon transmittal HR back to SR. JOSE Mcintosh called and updated on patient event. @ 1600 Reassessment no changes noted. @ 1320 Pt ambulated in hallway. @ 1212 Rounding, no needs, will continue to monitor. @ 1142 Reassessment, no changes noted, Lunch tray given. @ [address] at beside, RN 
 
@ 26 Dr. Roxy Parks at bedside, Plan walk more, wean 02, needs BM. @ 1013 Wife at the bedside, updated, no family concerns. Will continue to monitor. @ 0930 Rounding, pt with no needs, will continue to monitor. @ 1102 Dr. Beata Neal at beside and updated, no new orders or interventions. Will continue too monitor. @ 0848 Rounding, medication per MAR. Pt ate 60% of breakfast tray. No needs, no pain. Call bell placed back within easy reach, will continue to monitor. @ 7950-7780 Assessment completed, pt with no needs or no pain, medication per MAR. O2 titrated down to 2L, tolerating at this time SATs. s 94%. Call bell placed back within easy reach, will continue to monitor, breakfast tray given. Loni Magana 02/10/19 @ 0700 Beside verbal report rcv'd from OLEG Colon, RN. Chest rise and fall equally on 3L SAT's 96 %. No pain or needs assessment to follow.

## 2019-02-10 NOTE — PROGRESS NOTES
ATTENDING SURGEON NOTE 
 
POD # 2 I saw and evaluated Jake Mccarthy. on rounds today. I discussed my assessment and the plans with the PA on our service. He is resting comfortably in a chair and is feeling well. His incisional pain is under good control. He did not sleep well last night. He is hungry but is not eating well. Bowels are not moving yet. He is ambulating a bit better. He had an episode of ST vs Aflutter last night, short-lived. He is up to 1000 cc on the bedside incentive spirometer. VS stable. Lungs are clear to auscultation bilaterally, without wheezes and without rhonchi. The heart rate and rhythm were irregular with many PACs. Abdomen was soft, with bowel sounds present. Sternal wound was clean and dry with no evidence of infection. His oxygen saturation was 94% on 2 liters of oxygen via nasal cannula. EKG with NSR, bifascicular block, He is progressing along. Needs BM. Rhythm issues (ST though with bifascicular block) prevent increasing his beta-blockade much, although with A and V wires in there is aback-up. Plans for today include: 
Advance oral intake. Wean off oxygen. Start diuresis. Ambulate at bedside and in simpson. Keep in epicardial wires. He verbalizes understanding and agreement with the plan. I spoke with his spouse today to provide an update on his condition.  
 
Isidro Esteban MD

## 2019-02-10 NOTE — PROGRESS NOTES
CARDIAC SURGERY PROGRESS NOTE 
 
2/10/2019, 11:02 AM 
  
Post Operative Day # 2 Chart reviewed. Interval History/Events of Past 24 hours:  
Brief episodes of ST vs a. Flutter yest > IV Lopressor given and PO Lopressor increased. Subjective: 
Patient seen and examined on rounds today. Pain level: controlled Ambulating: some Sleeping: poorly Eating:  well Sternal pain: no 
 
BM: No 
 
Objective: 
Vital signs:  
Visit Vitals /68 Pulse 86 Temp 99.5 °F (37.5 °C) Resp 19 Ht 6' (1.829 m) Wt 97.4 kg (214 lb 12.8 oz) SpO2 94% BMI 29.13 kg/m² Temp (24hrs), Av.2 °F (37.3 °C), Min:98.5 °F (36.9 °C), Max:99.7 °F (37.6 °C) Admission Weight: Last Weight Weight: 97.5 kg (215 lb) Weight: 97.4 kg (214 lb 12.8 oz) Last 3 Recorded Weights in this Encounter 19 0708 19 0600 02/10/19 0630 Weight: 91.1 kg (200 lb 14.4 oz) 97 kg (213 lb 14.4 oz) 97.4 kg (214 lb 12.8 oz) Telemetry: NSR 84 Physical Examination:    
General:  Alert, oriented Lungs: Clear to ascultation without rales, wheezes or rhonchi. Chest: Dressings clean and dry. Sternum stable. Heart: regular rate and rhythm, No murmur. Abdomen: Soft and non-tender without masses. Bowel sounds present. Extremities: Warm and well perfused. Edema absent. Incisions: clean and dry Neuro: No deficit. Beta-blocker: Yes ASA: Yes  
Statin: Yes ACE-I: No 
Diuretic: No  
 
SUP Prophylaxis: Pepcid DVT Prophylaxis:  
pneumatic compression boots: Yes Compression stockings:  Yes Enoxaparin:  Yes Chest tubes:  not present Pacing wires:  present DME needs:  tbd Labs: 
Lab Results Component Value Date/Time WBC 6.8 2019 04:00 AM  
 HCT 34.0 (L) 2019 04:00 AM  
  2019 04:00 AM  
  
Lab Results Component Value Date/Time   2019 04:00 AM  
 K 4.1 2019 04:00 AM  
  (H) 2019 04:00 AM  
 CO2 24 2019 04:00 AM  
  (H) 02/09/2019 04:00 AM  
 BUN 16 02/09/2019 04:00 AM  
 CREA 1.05 02/09/2019 04:00 AM  
 CREA 1.29 02/08/2019 01:40 PM  
 CREA 1.49 (H) 02/07/2019 06:25 PM  
 
Lab Results Component Value Date/Time HBA1C 7.0 (H) 02/03/2019 01:42 AM  
 
 
Assessment: 
CAD S/P  CABG x 4 -  Cont BB, ASA, statin Respiratory parameters stable Cardiac status stable DM2 A1c 7.0 on SSI Plans: 
1. Follow for Lantus needs 2.  wean oxygen 3. Cont cathartics Heart Hugger use encouraged to mitigate pain and will also assist with deep breathing and incentive spirometry goals. Possible discharge 2 days.  
 
Elroy Santo PA-C CVTS 
02/10/19, 11:02 AM

## 2019-02-11 ENCOUNTER — HOME HEALTH ADMISSION (OUTPATIENT)
Dept: HOME HEALTH SERVICES | Facility: HOME HEALTH | Age: 74
End: 2019-02-11
Payer: MEDICARE

## 2019-02-11 PROBLEM — Z95.1 S/P CABG X 5: Status: ACTIVE | Noted: 2019-02-11

## 2019-02-11 LAB
ANION GAP SERPL CALC-SCNC: 8 MMOL/L (ref 3–18)
ATRIAL RATE: 150 BPM
ATRIAL RATE: 155 BPM
ATRIAL RATE: 286 BPM
ATRIAL RATE: 77 BPM
BASOPHILS # BLD: 0 K/UL (ref 0–0.1)
BASOPHILS NFR BLD: 0 % (ref 0–2)
BUN SERPL-MCNC: 19 MG/DL (ref 7–18)
BUN/CREAT SERPL: 18 (ref 12–20)
CALCIUM SERPL-MCNC: 8 MG/DL (ref 8.5–10.1)
CALCULATED P AXIS, ECG09: -128 DEGREES
CALCULATED P AXIS, ECG09: 39 DEGREES
CALCULATED R AXIS, ECG10: -43 DEGREES
CALCULATED R AXIS, ECG10: -47 DEGREES
CALCULATED R AXIS, ECG10: -54 DEGREES
CALCULATED R AXIS, ECG10: -54 DEGREES
CALCULATED T AXIS, ECG11: 16 DEGREES
CALCULATED T AXIS, ECG11: 26 DEGREES
CALCULATED T AXIS, ECG11: 32 DEGREES
CALCULATED T AXIS, ECG11: 6 DEGREES
CHLORIDE SERPL-SCNC: 104 MMOL/L (ref 100–108)
CO2 SERPL-SCNC: 25 MMOL/L (ref 21–32)
CREAT SERPL-MCNC: 1.06 MG/DL (ref 0.6–1.3)
DIAGNOSIS, 93000: NORMAL
DIFFERENTIAL METHOD BLD: ABNORMAL
EOSINOPHIL # BLD: 0 K/UL (ref 0–0.4)
EOSINOPHIL NFR BLD: 0 % (ref 0–5)
ERYTHROCYTE [DISTWIDTH] IN BLOOD BY AUTOMATED COUNT: 13.1 % (ref 11.6–14.5)
GLUCOSE BLD STRIP.AUTO-MCNC: 150 MG/DL (ref 70–110)
GLUCOSE BLD STRIP.AUTO-MCNC: 171 MG/DL (ref 70–110)
GLUCOSE BLD STRIP.AUTO-MCNC: 222 MG/DL (ref 70–110)
GLUCOSE BLD STRIP.AUTO-MCNC: 268 MG/DL (ref 70–110)
GLUCOSE SERPL-MCNC: 153 MG/DL (ref 74–99)
HCT VFR BLD AUTO: 34.1 % (ref 36–48)
HGB BLD-MCNC: 11.6 G/DL (ref 13–16)
LYMPHOCYTES # BLD: 1.5 K/UL (ref 0.9–3.6)
LYMPHOCYTES NFR BLD: 17 % (ref 21–52)
MCH RBC QN AUTO: 29.1 PG (ref 24–34)
MCHC RBC AUTO-ENTMCNC: 34 G/DL (ref 31–37)
MCV RBC AUTO: 85.7 FL (ref 74–97)
MONOCYTES # BLD: 0.8 K/UL (ref 0.05–1.2)
MONOCYTES NFR BLD: 10 % (ref 3–10)
NEUTS SEG # BLD: 6.2 K/UL (ref 1.8–8)
NEUTS SEG NFR BLD: 73 % (ref 40–73)
P-R INTERVAL, ECG05: 104 MS
P-R INTERVAL, ECG05: 128 MS
P-R INTERVAL, ECG05: 144 MS
PLATELET # BLD AUTO: 173 K/UL (ref 135–420)
PMV BLD AUTO: 10.6 FL (ref 9.2–11.8)
POTASSIUM SERPL-SCNC: 4.2 MMOL/L (ref 3.5–5.5)
Q-T INTERVAL, ECG07: 296 MS
Q-T INTERVAL, ECG07: 296 MS
Q-T INTERVAL, ECG07: 318 MS
Q-T INTERVAL, ECG07: 394 MS
QRS DURATION, ECG06: 100 MS
QRS DURATION, ECG06: 104 MS
QRS DURATION, ECG06: 106 MS
QRS DURATION, ECG06: 126 MS
QTC CALCULATION (BEZET), ECG08: 445 MS
QTC CALCULATION (BEZET), ECG08: 456 MS
QTC CALCULATION (BEZET), ECG08: 467 MS
QTC CALCULATION (BEZET), ECG08: 475 MS
RBC # BLD AUTO: 3.98 M/UL (ref 4.7–5.5)
SODIUM SERPL-SCNC: 137 MMOL/L (ref 136–145)
VENTRICULAR RATE, ECG03: 124 BPM
VENTRICULAR RATE, ECG03: 150 BPM
VENTRICULAR RATE, ECG03: 155 BPM
VENTRICULAR RATE, ECG03: 77 BPM
WBC # BLD AUTO: 8.6 K/UL (ref 4.6–13.2)

## 2019-02-11 PROCEDURE — 74011250637 HC RX REV CODE- 250/637: Performed by: THORACIC SURGERY (CARDIOTHORACIC VASCULAR SURGERY)

## 2019-02-11 PROCEDURE — 80048 BASIC METABOLIC PNL TOTAL CA: CPT

## 2019-02-11 PROCEDURE — 74011250637 HC RX REV CODE- 250/637: Performed by: PHYSICIAN ASSISTANT

## 2019-02-11 PROCEDURE — 94762 N-INVAS EAR/PLS OXIMTRY CONT: CPT

## 2019-02-11 PROCEDURE — 97165 OT EVAL LOW COMPLEX 30 MIN: CPT

## 2019-02-11 PROCEDURE — 74011636637 HC RX REV CODE- 636/637: Performed by: PHYSICIAN ASSISTANT

## 2019-02-11 PROCEDURE — 87070 CULTURE OTHR SPECIMN AEROBIC: CPT

## 2019-02-11 PROCEDURE — 97535 SELF CARE MNGMENT TRAINING: CPT

## 2019-02-11 PROCEDURE — 74011250637 HC RX REV CODE- 250/637: Performed by: INTERNAL MEDICINE

## 2019-02-11 PROCEDURE — 65620000000 HC RM CCU GENERAL

## 2019-02-11 PROCEDURE — 85025 COMPLETE CBC W/AUTO DIFF WBC: CPT

## 2019-02-11 PROCEDURE — 74011250636 HC RX REV CODE- 250/636: Performed by: PHYSICIAN ASSISTANT

## 2019-02-11 PROCEDURE — 82962 GLUCOSE BLOOD TEST: CPT

## 2019-02-11 PROCEDURE — 36415 COLL VENOUS BLD VENIPUNCTURE: CPT

## 2019-02-11 RX ORDER — AMIODARONE HYDROCHLORIDE 200 MG/1
200 TABLET ORAL ONCE
Status: COMPLETED | OUTPATIENT
Start: 2019-02-11 | End: 2019-02-11

## 2019-02-11 RX ORDER — AMIODARONE HYDROCHLORIDE 200 MG/1
400 TABLET ORAL 3 TIMES DAILY
Status: DISCONTINUED | OUTPATIENT
Start: 2019-02-11 | End: 2019-02-13 | Stop reason: HOSPADM

## 2019-02-11 RX ORDER — POTASSIUM CHLORIDE 20 MEQ/1
20 TABLET, EXTENDED RELEASE ORAL DAILY
Status: DISCONTINUED | OUTPATIENT
Start: 2019-02-11 | End: 2019-02-13 | Stop reason: HOSPADM

## 2019-02-11 RX ORDER — INSULIN GLARGINE 100 [IU]/ML
20 INJECTION, SOLUTION SUBCUTANEOUS
Status: DISCONTINUED | OUTPATIENT
Start: 2019-02-11 | End: 2019-02-13 | Stop reason: HOSPADM

## 2019-02-11 RX ADMIN — PANTOPRAZOLE SODIUM 40 MG: 40 TABLET, DELAYED RELEASE ORAL at 08:12

## 2019-02-11 RX ADMIN — DOCUSATE SODIUM 100 MG: 100 CAPSULE, LIQUID FILLED ORAL at 08:13

## 2019-02-11 RX ADMIN — MUPIROCIN: 20 OINTMENT TOPICAL at 08:35

## 2019-02-11 RX ADMIN — Medication 10 ML: at 15:49

## 2019-02-11 RX ADMIN — ASPIRIN 81 MG: 81 TABLET, COATED ORAL at 08:12

## 2019-02-11 RX ADMIN — CHLORHEXIDINE GLUCONATE 10 ML: 1.2 RINSE ORAL at 08:35

## 2019-02-11 RX ADMIN — FLUTICASONE PROPIONATE 2 SPRAY: 50 SPRAY, METERED NASAL at 08:37

## 2019-02-11 RX ADMIN — AMIODARONE HYDROCHLORIDE 400 MG: 200 TABLET ORAL at 21:12

## 2019-02-11 RX ADMIN — CHLORHEXIDINE GLUCONATE 10 ML: 1.2 RINSE ORAL at 17:42

## 2019-02-11 RX ADMIN — POLYETHYLENE GLYCOL 3350 17 G: 17 POWDER, FOR SOLUTION ORAL at 08:12

## 2019-02-11 RX ADMIN — INSULIN LISPRO 3 UNITS: 100 INJECTION, SOLUTION INTRAVENOUS; SUBCUTANEOUS at 15:49

## 2019-02-11 RX ADMIN — FLUTICASONE PROPIONATE 2 SPRAY: 50 SPRAY, METERED NASAL at 17:42

## 2019-02-11 RX ADMIN — AMIODARONE HYDROCHLORIDE 400 MG: 200 TABLET ORAL at 15:48

## 2019-02-11 RX ADMIN — INSULIN LISPRO 6 UNITS: 100 INJECTION, SOLUTION INTRAVENOUS; SUBCUTANEOUS at 12:00

## 2019-02-11 RX ADMIN — INSULIN GLARGINE 20 UNITS: 100 INJECTION, SOLUTION SUBCUTANEOUS at 21:12

## 2019-02-11 RX ADMIN — POTASSIUM CHLORIDE 20 MEQ: 20 TABLET, EXTENDED RELEASE ORAL at 08:37

## 2019-02-11 RX ADMIN — AMIODARONE HYDROCHLORIDE 200 MG: 200 TABLET ORAL at 10:20

## 2019-02-11 RX ADMIN — INSULIN LISPRO 3 UNITS: 100 INJECTION, SOLUTION INTRAVENOUS; SUBCUTANEOUS at 08:13

## 2019-02-11 RX ADMIN — INSULIN LISPRO 9 UNITS: 100 INJECTION, SOLUTION INTRAVENOUS; SUBCUTANEOUS at 21:12

## 2019-02-11 RX ADMIN — AMIODARONE HYDROCHLORIDE 200 MG: 200 TABLET ORAL at 08:12

## 2019-02-11 RX ADMIN — ENOXAPARIN SODIUM 40 MG: 40 INJECTION SUBCUTANEOUS at 17:41

## 2019-02-11 RX ADMIN — ATORVASTATIN CALCIUM 40 MG: 40 TABLET, FILM COATED ORAL at 08:12

## 2019-02-11 RX ADMIN — MUPIROCIN: 20 OINTMENT TOPICAL at 17:42

## 2019-02-11 RX ADMIN — METOPROLOL TARTRATE 25 MG: 25 TABLET ORAL at 08:12

## 2019-02-11 RX ADMIN — METOPROLOL TARTRATE 25 MG: 25 TABLET ORAL at 21:11

## 2019-02-11 RX ADMIN — Medication 10 ML: at 21:15

## 2019-02-11 RX ADMIN — FUROSEMIDE 40 MG: 40 TABLET ORAL at 08:35

## 2019-02-11 NOTE — PROGRESS NOTES
Problem: Nutrition Deficit Goal: *Optimize nutritional status Outcome: Progressing Towards Goal 
Pt will increase intake for healing daily, 2/10/19 pt ate 10-30% of meals.  
 
Comments: Pt will increase intake for healing daily, 2/10/19 pt ate 10-30% of meals.

## 2019-02-11 NOTE — PROGRESS NOTES
Neuro intact. Denies pain this shift. TMAX 99.6. 
  
NSR 70-90s this shift, no events of ST requiring lopressor. Epicardial PM still present.   
  
O2 titrated off overnight, encouraged to use ICS Qhr when awake. Progressed to 1375 on ICS this shift. 
  
No GI issues, per pt he is passing flatus now. No BM this shift. 
  
 WNL, 1200cc total this shift Midsternal dressing CDI

## 2019-02-11 NOTE — NURSE NAVIGATOR
Transitional Care Team: Initial [facility] Note Date of Assessment: 02/11/19 Time of Assessment:  3:53 PM 
Hospital Nurse Navigator: Abi Rivers MSN, RN, [facility] Edward Jean is a 72 y.o. male [facility] with Chest pain Chest pain Chest pain on  2/3/2019. Primary Diagnosis NSTEMI (non-ST elevated myocardial infarction) ([address] Coronary artery bypass grafting x 5 (see operative note). Assessment and Plan as below: 1. He needs a cardiologist. List of [facility] cardiologist was left with the patient. 2. Diet: he states that he follows a low sodium and low carb diet. He's diabetic; he reports that he has a glucometer at home. He has had a few glucose readings in the 200's as inpatient. 3. Social: he lives with his wife. He reports being independent prior to admission. He also reports having a good support system. This Nurse Navigator accessed chart to offer support and placement in the [address] [place] [address] Team bridges the gaps in care and education surrounding discharge from the acute care facility. The objective is to empower the patient and family in taking a proactive role in the task of preventing readmission within the first thirty days after discharge from the acute care setting. The team is also involved in the efforts to reduce readmission to the acute care setting after stabilization and discharge from the acute care environment either to the skilled nursing facilities or community. EDUCATION / INTERVENTIONS OFFERED:   
 
 Patient education focused on readmission zones as described as: The Red Zone: High risk for readmission, days 1-21 The Yellow Zone: Moderate risk for readmission, days 22-29 The Green Zone: Lower risk for readmission, days 30 and after 1. Advance Care Planning:  not on file; unable to discuss. Patient in CVT ICU.  
 
BARRIERS IDENTIFIED: 
 
 Nery Julian. expressed the following barriers to his discharge:  
lack of knowledge about disease PLAN: 
A written individual care plan including education materials, a calendar individualized with their follow up appointments with primary care providers, specialist, and telephone numbers of healthcare personnel that are available for them to ask questions during the recovery phase. Pt will be discharging to home with [facility]. The TC Team will follow patient from a distance while inpatient as well as be available for further transition disposition as needed. The [facility] TEAM will continue to offer support during the 30- 90 day discharge from acute care setting. Notified Ambulatory [facility] Nurse Navigator, Syed Luque RN. Past Medical History:  
Diagnosis Date  Arthritis  Diabetes ([facility] [address]  Hypertension Advance Care Planning 2/5/2019 Confirm Advance Directive None  
 
Ehsan HICKS, RN, [facility] Care Management [phone]

## 2019-02-11 NOTE — PROGRESS NOTES
conducted a Follow up consultation and Spiritual Assessment for Manasa Morris, who is a 72 y.o.,male. The  provided the following Interventions: 
Continued the relationship of care and support. Listened empathically. Offered assurance of  prayer on patients behalf. Chart reviewed. The following outcomes were achieved: 
Patient expressed gratitude for 's visit. Assessment: 
There are no further spiritual or Catholic issues which require Spiritual Care Services interventions at this time. Plan: 
Chaplains will continue to follow and will provide pastoral care on an as needed/requested basis.  recommends bedside caregivers page  on duty if patient shows signs of acute spiritual or emotional distress. Chaplain Jeanine Seo Spiritual Care  
[phone]

## 2019-02-11 NOTE — PROGRESS NOTES
CARDIAC SURGERY PROGRESS NOTE 
 
2019, 10:00 AM 
  
Post Operative Day # 3 Chart reviewed. Interval History/Events of Past 24 hours:  
Intermittent sinus arrythmias - ST, afib, aflutter. Low greade temp Tm 99.8. Lasix/K started yesterday. Labs ok. Weaned off oxygen. Subjective: 
Patient seen and examined on rounds today. - c/o productive cough. Concerned that URI wasn't cleared. rec'd azithromycin and levaquin this admission. Pain level: controlled Ambulating: well Sleeping: well Eating:  well Sternal pain: NO  
 
BM: Yes Objective: 
Vital signs:  
Visit Vitals /60 Pulse 89 Temp 99.3 °F (37.4 °C) Resp 24 Ht 6' (1.829 m) Wt 97.4 kg (214 lb 12.8 oz) SpO2 92% BMI 29.13 kg/m² Temp (24hrs), Av.2 °F (37.3 °C), Min:98.8 °F (37.1 °C), Max:99.8 °F (37.7 °C) Admission Weight: Last Weight Weight: 97.5 kg (215 lb) Weight: 97.4 kg (214 lb 12.8 oz) Last 3 Recorded Weights in this Encounter 19 0708 19 0600 02/10/19 0630 Weight: 91.1 kg (200 lb 14.4 oz) 97 kg (213 lb 14.4 oz) 97.4 kg (214 lb 12.8 oz) Telemetry: NSR Physical Examination:    
General:  Alert, oriented Lungs: Clear to ascultation without rales, wheezes or rhonchi. Chest: Dressings clean and dry. Sternum stable. Heart: regular rate and rhythm, No murmur. Abdomen: Soft and non-tender without masses. Bowel sounds present. Extremities: Warm and well perfused. Edema mild. Incisions: clean and dry Neuro: No deficit. Beta-blocker: Yes ASA: Yes  
Statin: Yes ACE-I: No 
Diuretic: Yes  
 
SUP Prophylaxis: Pepcid DVT Prophylaxis:  
pneumatic compression boots: Yes Compression stockings:  Yes Enoxaparin:  Yes 
 
DME needs:  tbd Labs: 
Lab Results Component Value Date/Time WBC 8.6 2019 06:09 AM  
 HCT 34.1 (L) 2019 06:09 AM  
  2019 06:09 AM  
  
Lab Results Component Value Date/Time   2019 06:09 AM  
 K 4.2 02/11/2019 06:09 AM  
  02/11/2019 06:09 AM  
 CO2 25 02/11/2019 06:09 AM  
  (H) 02/11/2019 06:09 AM  
 BUN 19 (H) 02/11/2019 06:09 AM  
 CREA 1.06 02/11/2019 06:09 AM  
 CREA 1.05 02/09/2019 04:00 AM  
 CREA 1.29 02/08/2019 01:40 PM  
 
Lab Results Component Value Date/Time HBA1C 7.0 (H) 02/03/2019 01:42 AM  
 
Assessment: 
CAD S/P  CABG x 4 -  BB, ASA, statin Respiratory parameters stable Cardiac status stable DM2 Plans: 
1. Send sputum culture. +/-  ABX 2. Increase Amiodarone to 400 mg PO TID 3. D/c PW - done without difficulty. Heart Hugger use encouraged to mitigate pain and will also assist with deep breathing and incentive spirometry goals. Possible discharge 1 days. JOSE Rodriguez-C CVTS 
02/11/19, 10:00 AM 
 
 
 
ATTENDING SURGEON NOTE 
 
POD # 3 I saw and evaluated Nery Julian. on rounds today and have discussed the assessment and plans as outlined above with the PA. He feels well, in good spirits. Incisional pain is described as mild. He is ambulating well. He is sleeping OK. His appetite is good and he is moving his bowels yet. Productive cough. VS stable. Afebrile. Lungs were clear to auscultation bilaterally. The heart rate and rhythm were regular. Abdomen was soft with good bowel sounds. The sternal wound dressing was dry and intact. His oxygen saturation was 92% on room air. Hct 34; WBC 8.6K 
 
K+ 4.2 ; creat 1.0 His postoperative course is progressing along. The plans for today include: 
Advancing oral intake. Ambulate in simpson. Remove epicardial wires (done). Check sputum gram stain and start antibiotics if fevers occur. Patient understands and agrees with the plan. I also spoke with his spouse today to provide an update on his condition.  
 
Geraldine Villarreal MD

## 2019-02-11 NOTE — PROGRESS NOTES
[facility]  -  Progress Note Patient: Nayan Osborne. MRN: [id number]  SSN: [id number] YOB: 1945  Age: 72 y.o. Sex: male Admit Date: 2/3/2019 Assessment:  
 
Hospital Problems  Date Reviewed: 2/6/2019 Codes Class Noted POA  
 S/P CABG x 5 ICD-10-CM: Z95.1 ICD-9-CM: V45.81  2/11/2019 Unknown * (Principal) NSTEMI (non-ST elevated myocardial infarction) ([facility] [address] ICD-10-CM: I21.4 ICD-9-CM: 410.70  2/6/2019 Yes Chest pain ICD-10-CM: R07.9 ICD-9-CM: 786.50  2/3/2019 Unknown  
   
  
 
  
-CAD s/p CABG X5 (LIMA to LAD, SVG to Diag1/OM1, SVG to dRCA/PLB) 2/8/2019.  
-NSTEMI- with peak troponin at 9.  
-Post-operative atrial fibrillation/flutter. On BB and amiodarone with rates in the 80's. 
-Normal EF 60% by echo this admission. 
-Acute bronchitis- on abx -HTN- elevated on examination but says he has been stable at home. 
-DM Type II on oral meds 
-Dyslipidemia- on statin 
-Renal insult 
  
-No primary cardiologist, Dr. Rangel Postal PMD 
 
 
Plan:  
 
Stable and doing well post-op from CABG. Continue with ASA, amiodarone at higher dose, statin, Lasix, BB. Continue with increasing activity as tolerated Hopefully home soon Subjective:  
 
Patient sitting up in chair and only complains of soreness to the chest wall. No other symptoms. Telemetry has mostly NSR but last 24 hours with a-fib/flutter. Objective:  
  
Patient Vitals for the past 8 hrs: 
 Temp Pulse Resp BP SpO2  
02/11/19 0900  89 24 116/60 92 % 02/11/19 0800  95 21  92 % 02/11/19 0730 99.3 °F (37.4 °C)      
02/11/19 0700  91 10  92 % 02/11/19 0600  80 17 123/75 93 % 02/11/19 0500  85 17 117/71 92 % 02/11/19 0400 99.2 °F (37.3 °C) 86 17 121/74 94 % 02/11/19 0300  86 19 122/73 92 % 02/11/19 0226  85 19 125/76 96 % Patient Vitals for the past 96 hrs: 
 Weight  
02/10/19 0630 97.4 kg (214 lb 12.8 oz) 02/09/19 0600 97 kg (213 lb 14.4 oz) 02/08/19 0708 91.1 kg (200 lb 14.4 oz) Intake/Output Summary (Last 24 hours) at 2/11/2019 1004 Last data filed at 2/11/2019 0800 Gross per 24 hour Intake 930 ml Output 2050 ml Net -1120 ml Physical Exam: 
General:  alert, cooperative, no distress, appears stated age Neck:  nontender, no JVD Lungs:  clear to auscultation bilaterally Heart:  regular rate and rhythm, S1, S2 normal, no murmur, click, rub or gallop Abdomen:  abdomen is soft without significant tenderness, masses, organomegaly or guarding Extremities:  extremities normal, atraumatic, no cyanosis or edema Data Review:  
 
Labs: Results:  
   
Chemistry Recent Labs  
  02/11/19 
0609 02/09/19 
0400 02/08/19 
1340 * 138* 187*  140 137  
K 4.2 4.1 4.0  
 109* 106 CO2 25 24 24 BUN 19* 16 18 CREA 1.06 1.05 1.29  
CA 8.0* 7.6* 7.5* MG  --  2.2 2.6 AGAP 8 7 7 BUCR 18 15 14 CBC w/Diff Recent Labs  
  02/11/19 
0609 02/09/19 
0400 02/08/19 
1340 WBC 8.6 6.8 9.7  
RBC 3.98* 3.97* 4.06* HGB 11.6* 11.6* 11.9*  
HCT 34.1* 34.0* 34.3*  
 138 139 GRANS 73  --  58  
LYMPH 17*  --  20  
EOS 0  --  3 Cardiac Enzymes No results found for: CPK, CK, CKMMB, CKMB, RCK3, CKMBT, CKNDX, CKND1, CANDI, TROPT, TROIQ, GERMAINE, TROPT, TNIPOC, BNP, BNPP Coagulation Recent Labs 02/08/19 
1340 PTP 15.2 INR 1.2 APTT 30.1 Lipid Panel Lab Results Component Value Date/Time Cholesterol, total 100 02/04/2019 02:15 AM  
 HDL Cholesterol 50 02/04/2019 02:15 AM  
 LDL, calculated 33.8 02/04/2019 02:15 AM  
 VLDL, calculated 16.2 02/04/2019 02:15 AM  
 Triglyceride 81 02/04/2019 02:15 AM  
 CHOL/HDL Ratio 2.0 02/04/2019 02:15 AM  
  
BNP No results found for: BNP, BNPP, XBNPT Liver Enzymes No results for input(s): TP, ALB, TBIL, AP, SGOT, GPT in the last 72 hours. No lab exists for component: DBIL Digoxin Thyroid Studies Lab Results Component Value Date/Time TSH 0.45 02/03/2019 03:57 PM

## 2019-02-11 NOTE — PROGRESS NOTES
Problem: Self Care Deficits Care Plan (Adult) Goal: *Acute Goals and Plan of Care (Insert Text) Outcome: Resolved/Met Date Met: 02/11/19 Occupational Therapy EVALUATION/discharge Patient: Shelly Sorto (77 y.o. male) Date: 2/11/2019 Primary Diagnosis: Chest pain Chest pain Chest pain Procedure(s) (LRB): 
CORONARY ARTERY BYPASS GRAFT (CABG) TIMES FIVE, LEFT ENDOSCOPIC SAPHENOUS VEIN GRAFT HARVEST AND LEFT INTERNAL MAMMARY HARVEST/STERNALOK (N/A) ESOPHAGEAL TRANS ECHOCARDIOGRAM (N/A) 3 Days Post-Op Precautions: Sternal    
 
ASSESSMENT AND RECOMMENDATIONS: 
Pt cleared to participate in OT eval by RN. Upon entering room, pt seated in recliner, alert, and agreeable to therapy. Based on the objective data described below, the patient presents he is able to perform all basic self-care/ADLs with supervision/standby assist. He required no AD during toilet transfers and was able to perform LB dressing tasks with supervision. Pt required 1 verbal cue to use heart hugger before sit>stand. Will defer to PT for functional balance and mobility tasks. Educated pt on the role of OT, evaluation process, sternal precautions, relating precautions to self-care tasks, energy conservation/pursed lip breathing, and upper body dressing techniques with pt demonstrating good understanding. The pt has a supportive wife at home to assist him PRN. Skilled occupational therapy is not indicated at this time. Discharge Recommendations: Home Health Further Equipment Recommendations for Discharge: N/A; Pt has all the recommended equipment for pt to safely return home. Barriers to Learning/Limitations: None Compensate with: visual, verbal, tactile, kinesthetic cues/model COMPLEXITY Eval Complexity: History: MEDIUM Complexity : Expanded review of history including physical, cognitive and psychosocial  history ;  Examination: LOW Complexity : 1-3 performance deficits relating to physical, cognitive , or psychosocial skils that result in activity limitations and / or participation restrictions ; Decision Making:LOW Complexity : No comorbidities that affect functional and no verbal or physical assistance needed to complete eval tasks  Assessment: Low Complexity SUBJECTIVE:  
Patient stated I feel much better now that you came OBJECTIVE DATA SUMMARY:  
 
Past Medical History:  
Diagnosis Date  Arthritis  Diabetes ([address]  Hypertension Past Surgical History:  
Procedure Laterality Date  HX HEENT    
 tumor removed from neck  HX ORTHOPAEDIC    
 elbow  LAP,CHOLECYSTECTOMY  11/12/15 Dr. Esha Mccartney  NEUROLOGICAL PROCEDURE UNLISTED    
 back surgery x2 Prior Level of Function/Home Situation: Pt reports he lives with his wife in a [facility] and was (I) with ADLs PTA. Home Situation Home Environment: Private residence One/Two Story Residence: One story Living Alone: No 
Support Systems: Congregation / matt community, Family member(s), Friends \ neighbors Patient Expects to be Discharged to[de-identified] Private residence Current DME Used/Available at Home: Rolling walker, cane, shower chair Tub or Shower Type: Shower(Pt has both tub/shower combo and walk-in shower but uses walk-in shower the most) [x]     Right hand dominant   []     Left hand dominantCognitive/Behavioral Status: 
Neurologic State: Alert Orientation Level: Oriented X4 Cognition: Appropriate decision making; Follows commands Safety/Judgement: Awareness of environment; Fall prevention Skin: Visible skin appeared intact Edema: None noted Vision/Perceptual:   
Acuity: Within Defined Limits(with glasses) Corrective Lenses: Reading glasses Coordination: 
Coordination: Within functional limits(BUEs) Fine Motor Skills-Upper: Left Intact; Right Intact Gross Motor Skills-Upper: Left Intact; Right Intact Balance: 
Sitting: Intact Standing: Intact; Without support Standing - Static: Good Standing - Dynamic : Fair(+) Strength: 
Strength: Generally decreased, functional(BUEs) Tone & Sensation: 
Tone: Normal(BUEs) Sensation: Intact(BUEs) Range of Motion: 
AROM: Generally decreased, functional(BUE; 0-90* to follow sternal precautions) Functional Mobility and Transfers for ADLs: 
Bed Mobility: 
Pt already seated in recliner upon arrival.Transfers: 
Sit to Stand: Supervision Toilet Transfer : Supervision Bathroom Mobility: Supervision/set up ADL Assessment: 
Feeding: Independent Oral Facial Hygiene/Grooming: Supervision(Standing at sink level) Bathing: Supervision Upper Body Dressing: Stand-by assistance; Supervision Lower Body Dressing: Supervision Toileting: Supervision ADL Intervention: Toileting tasks and transfers at toilet level with supervision. 1 verbal cue to use heart hugger before sit>stand. Lower Body Dressing Assistance Dressing Assistance: Supervision/set up Leg Crossed Method Used: Yes Position Performed: Seated in chair Educated pt on how to properly don/doff shirt for UB dressing tasks. Cognitive Retraining Safety/Judgement: Awareness of environment; Fall prevention Pain: 
Pt reports 0/10 pain or discomfort prior to treatment.   
Pt reports 0/10 pain or discomfort post treatment. Activity Tolerance:  
Good Please refer to the flowsheet for vital signs taken during this treatment. After treatment:  
[x]  Patient left in no apparent distress sitting up in chair 
[]  Patient left in no apparent distress in bed 
[x]  Call bell left within reach [x]  Nursing notified 
[]  Caregiver present 
[]  Bed alarm activated COMMUNICATION/EDUCATION:  
Communication/Collaboration: 
[x]      Home safety education was provided and the patient/caregiver indicated understanding. [x]      Patient/family have participated as able and agree with findings and recommendations. []      Patient is unable to participate in plan of care at this time.  
 
[facility] OT 
 Time Calculation: 23 mins

## 2019-02-11 NOTE — PROGRESS NOTES
@ 1900 Bedside and Verbal shift change report given to Albina Lee RN (oncoming nurse) by HATTIE Hathaway RN (offgoing nurse). Report given with SBAR, Kardex, Intake/Output and Recent Results. @ 1800 PT with uneventful shift, all needs met, all medication given per MAR. Pt with no current needs, will continue to monitor. @ [phone], pt ambulated in hallway, tolerated well. Still with poor appetite 40% dinner eaten. No needs or pain at this time, medication per [facility] Call elizabeth placed back within easy reach, will continue to monitor. @ 1630 Rounding, pt with no needs, will continue to monitor. @ [phone] Rounding, Medication per MAR. Dr. Alcides Schneider also present at bedside. PLAN d/c in the next few days. Discharge video  to be played for family member and patient at this time. Pt assisted to chair, call bell placed within easy reach, no other needs noted, will continue to monitor. 2/11/19 @ 1430 Assumed care of patient. Report rcv'd from [facility]. Pt currently in bed resting quietly,eyes open and close spontaneously. Chest rise and fall equally via room air. VSS, no pain or needs. Assessment completed, call bell placed back within easy reach, will continue to monitor.

## 2019-02-11 NOTE — PROGRESS NOTES
Bedside and Verbal shift change report given to OLEG Parkinson (oncoming nurse) by Kvng Hathaway RN (offgoing nurse). Report given with SBAR, Kardex, Intake/Output and Recent Results.

## 2019-02-11 NOTE — DIABETES MGMT
Glycemic Control Plan of Care 
 
T2DM with current A1c of 7.0% (02/03/2019). See separate notes, 02/05/2019, for assessment of home diabetes management and education. Home diabetes meds prior to admission: 
Actos 30 mg once daily. Glimepride 1 mg daily every morning Metformin 500 mg twice daily with meals. Bydureon 2 mg SQ every 7 days (Friday). POC BG range on 02/10/2019:  mg/dL. POC BG report on 02/11/2019 at time of review: 171, 222 mg/dL. Patient on 10 units of lantus insulin daily and very resistant dose of correctional lispro insulin at time of review. Seen patient this afternoon and reviewed his pre adm diabetes meds obtained from patient on 2/05/2019 and no change when I spoke to him today. Recommendation(s): 
1.) Consider giving an addition of 10 units of lantus insulin today since BG still above target range. Discussed with JOSE Ware. 
2.) Resume pre adm diabetes meds at discharge: Actos, Glimepiride, Metformin, and Bydureon every 7 days (Friday). Assessment: 
Patient is 72year old with past medical history including type 2 diabetes mellitus, hypertension, and arthritis - was admitted on 02/03/2019 with report of chest pain. Noted: 
NSTEMI. 
CAD. S/P CABG Friday, 02/08/2019, 
T2DM with current A1c of 7.0% (02/03/2019). Most recent blood glucose values: 
 
Results for [employer] (MRN [id number]) as of 2/11/2019 13:37 Ref. Range 2/10/2019 00:50 2/10/2019 01:48 2/10/2019 07:52 2/10/2019 11:45 2/10/2019 16:02 2/10/2019 21:05 GLUCOSE,FAST - POC Latest Ref Range: 70 - 110 mg/dL 83 94 120 (H) 191 (H) 153 (H) 234 (H) Results for [employer] (MRN [id number]) as of 2/11/2019 13:37 Ref. Range 2/11/2019 08:00 2/11/2019 11:52 GLUCOSE,FAST - POC Latest Ref Range: 70 - 110 mg/dL 171 (H) 222 (H) Current A1C: 7.23% (2/03/2019) which is equivalent to estimated average blood glucose of 163 mg/dL during the past 2-3 months. Current hospital diabetes medications: 
Basal lantus insulin 10 units daily. Correctional lispro insulin ACHS. Very resistant dose. Total daily dose insulin requirement previous day: 02/10/2019 Lantus: 10 units Lispro: 9 units TDD insulin: 19 units Home diabetes medications: Patient reported on 02/05/2019:  
Actos 30 mg once daily. Glimepride 1 mg daily every morning Metformin 500 mg twice daily with meals. Bydureon 2 mg SQ every 7 days (Friday). Diet: Cardiac regular; consistent carb 2200kcal; no concentrated sweets. Goals:  Blood glucose will be within target range of  mg/dL by 02/14/2019. Education:  _X__  Refer to Diabetes Education Record: 02/05/2019 
           ___  Education not indicated at this time Howard Barboza RN [facility] Pager: [phone]

## 2019-02-11 NOTE — PROGRESS NOTES
NUTRITION Nutrition Consult: General Nutrition Management & Supplements RECOMMENDATIONS / PLAN:  
 
- Continue current nutrition interventions. - Continue RD inpatient monitoring and evaluation. NUTRITION INTERVENTIONS & DIAGNOSIS:  
 
[x] Meals/snacks: modify composition 
[x] Nutrition counseling/education: cardiac, diabetic diet education 2/11/19 Nutrition Diagnosis: Nutrition related knowledge deficit related to hx of CAD and DM as evidenced by pt in need of cardiac, diabetic diet education. ASSESSMENT:  
 
2/11: Tolerating diet, BM today, decreased appetite and fair meal intake- not interested in supplements. Receptive to diet education. 2/9: S/p CABG x 4 yesterday, tolerating clears and advanced to cardiac diet- hx of diabetes noted. Having chest tubes removed at time of visit. Average po intake adequate to meet patients estimated nutritional needs:   [] Yes     [] No   [x] Unable to determine at this time Diet: DIET CARDIAC Regular; Consistent Carb 2200kcal     
Food Allergies: NKFA Current Appetite:   [] Good     [x] Fair     [] Poor     [] Other: 
Appetite/meal intake prior to admission:   [x] Good     [] Fair     [] Poor     [] Other:  
Feeding Limitations:  [] Swallowing difficulty    [] Chewing difficulty    [] Other: 
Current Meal Intake:  
Patient Vitals for the past 100 hrs: 
 % Diet Eaten 02/11/19 0800 75 % 02/10/19 1732 30 % 02/10/19 1600 0 % 02/10/19 1526 0 % 02/10/19 1320 0 % 02/10/19 1212 10 % 02/10/19 1142 0 %  
02/10/19 0930 0 % 02/10/19 0849 60 % 02/10/19 0730 0 % 02/07/19 1728 100 % 02/07/19 1247 100 % 02/07/19 0856 100 % BM: 2/11 Skin Integrity: surgical incisions to sternum and leg Edema:   [x] No     [] Yes Pertinent Medications: Reviewed Recent Labs  
  02/11/19 
0609 02/09/19 
0400 02/08/19 
1340  140 137  
K 4.2 4.1 4.0  
 109* 106 CO2 25 24 24 * 138* 187* BUN 19* 16 18 CREA 1.06 1.05 1.29  
 CA 8.0* 7.6* 7.5* MG  --  2.2 2.6 Intake/Output Summary (Last 24 hours) at 2/11/2019 1254 Last data filed at 2/11/2019 1100 Gross per 24 hour Intake 810 ml Output 2350 ml Net -1540 ml Anthropometrics: 
Ht Readings from Last 1 Encounters:  
02/04/19 6' (1.829 m) Last 3 Recorded Weights in this Encounter 02/08/19 0708 02/09/19 0600 02/10/19 0630 Weight: 91.1 kg (200 lb 14.4 oz) 97 kg (213 lb 14.4 oz) 97.4 kg (214 lb 12.8 oz) Body mass index is 29.13 kg/m². Weight History: weight gain per chart hx review; pt denies change in weight PTA- usual weight is 215 lb Weight Metrics 2/10/2019 12/17/2018 11/25/2015 11/12/2015 11/9/2015 10/11/2015 9/9/2015 Weight 214 lb 12.8 oz 210 lb 189 lb 189 lb 190 lb 190 lb 190 lb BMI 29.13 kg/m2 29.29 kg/m2 26.37 kg/m2 26.37 kg/m2 25.76 kg/m2 25.76 kg/m2 26.51 kg/m2 Admitting Diagnosis: Chest pain Chest pain Chest pain Pertinent PMHx: DM, HTN Education Needs:        [] None identified  [] Identified - Not appropriate at this time  [x]  Identified and addressed - refer to education log Learning Limitations:   [x] None identified  [] Identified Cultural, Christian & ethnic food preferences:  [x] None identified    [] Identified and addressed ESTIMATED NUTRITION NEEDS:  
 
Calories: 2794-7541 kcal (MSJx1.2-1.3) based on  [x] Actual BW 95 kg   [] IBW Protein:  gm (0.8-1.2 gm/kg) based on  [x] Actual BW      [] IBW Fluid: 1 mL/kcal 
  
MONITORING & EVALUATION:  
 
Nutrition Goal(s): 1. Po intake of meals will meet >75% of patient estimated nutritional needs within the next 7 days. Outcome:  [] Met/Ongoing    [x]  Not Met    [] New/Initial Goal  
2. Patient will increase knowledge of appropriate food choices on a cardiac, diabetic diet within 7 days.   Outcome:  [] Met    []  Not Met    [x] New/Initial Goal  
 
Monitoring:   [x] Food and beverage intake   [x] Diet order   [x] Nutrition-focused physical findings   [x] Treatment/therapy   [] Weight   [] Enteral nutrition intake Previous Recommendations (for follow-up assessments only):     [x]   Implemented       []   Not Implemented (RD to address)      [] No Longer Appropriate     [] No Recommendation Made Discharge Planning: cardiac, diabetic diet [x] Participated in care planning, discharge planning, & interdisciplinary rounds as appropriate Clarisa Cruz RD, [facility] Pager: [phone]

## 2019-02-12 LAB
ABO + RH BLD: NORMAL
BLD PROD TYP BPU: NORMAL
BLD PROD TYP BPU: NORMAL
BLOOD GROUP ANTIBODIES SERPL: NORMAL
BPU ID: NORMAL
BPU ID: NORMAL
CROSSMATCH RESULT,%XM: NORMAL
CROSSMATCH RESULT,%XM: NORMAL
GLUCOSE BLD STRIP.AUTO-MCNC: 147 MG/DL (ref 70–110)
GLUCOSE BLD STRIP.AUTO-MCNC: 162 MG/DL (ref 70–110)
GLUCOSE BLD STRIP.AUTO-MCNC: 167 MG/DL (ref 70–110)
GLUCOSE BLD STRIP.AUTO-MCNC: 194 MG/DL (ref 70–110)
SPECIMEN EXP DATE BLD: NORMAL
STATUS OF UNIT,%ST: NORMAL
STATUS OF UNIT,%ST: NORMAL
UNIT DIVISION, %UDIV: 0
UNIT DIVISION, %UDIV: 0

## 2019-02-12 PROCEDURE — 82962 GLUCOSE BLOOD TEST: CPT

## 2019-02-12 PROCEDURE — 74011250637 HC RX REV CODE- 250/637: Performed by: INTERNAL MEDICINE

## 2019-02-12 PROCEDURE — 65620000000 HC RM CCU GENERAL

## 2019-02-12 PROCEDURE — 74011250636 HC RX REV CODE- 250/636: Performed by: PHYSICIAN ASSISTANT

## 2019-02-12 PROCEDURE — 74011250637 HC RX REV CODE- 250/637: Performed by: THORACIC SURGERY (CARDIOTHORACIC VASCULAR SURGERY)

## 2019-02-12 PROCEDURE — 97116 GAIT TRAINING THERAPY: CPT

## 2019-02-12 PROCEDURE — 74011250637 HC RX REV CODE- 250/637: Performed by: PHYSICIAN ASSISTANT

## 2019-02-12 PROCEDURE — 74011636637 HC RX REV CODE- 636/637: Performed by: PHYSICIAN ASSISTANT

## 2019-02-12 RX ORDER — GUAIFENESIN 600 MG/1
1200 TABLET, EXTENDED RELEASE ORAL EVERY 12 HOURS
Status: DISCONTINUED | OUTPATIENT
Start: 2019-02-12 | End: 2019-02-13 | Stop reason: HOSPADM

## 2019-02-12 RX ORDER — HYDROCODONE BITARTRATE AND ACETAMINOPHEN 5; 325 MG/1; MG/1
1 TABLET ORAL
Qty: 28 TAB | Refills: 0 | Status: SHIPPED | OUTPATIENT
Start: 2019-02-12 | End: 2019-03-13

## 2019-02-12 RX ORDER — DOCUSATE SODIUM 100 MG/1
100 CAPSULE, LIQUID FILLED ORAL
Qty: 14 CAP | Refills: 0 | Status: SHIPPED | OUTPATIENT
Start: 2019-02-12 | End: 2019-02-19

## 2019-02-12 RX ADMIN — INSULIN LISPRO 3 UNITS: 100 INJECTION, SOLUTION INTRAVENOUS; SUBCUTANEOUS at 16:08

## 2019-02-12 RX ADMIN — Medication 10 ML: at 14:00

## 2019-02-12 RX ADMIN — AMIODARONE HYDROCHLORIDE 400 MG: 200 TABLET ORAL at 21:50

## 2019-02-12 RX ADMIN — CHLORHEXIDINE GLUCONATE 10 ML: 1.2 RINSE ORAL at 09:02

## 2019-02-12 RX ADMIN — Medication 10 ML: at 22:00

## 2019-02-12 RX ADMIN — ENOXAPARIN SODIUM 40 MG: 40 INJECTION SUBCUTANEOUS at 18:24

## 2019-02-12 RX ADMIN — ATORVASTATIN CALCIUM 40 MG: 40 TABLET, FILM COATED ORAL at 08:45

## 2019-02-12 RX ADMIN — DOCUSATE SODIUM 100 MG: 100 CAPSULE, LIQUID FILLED ORAL at 08:45

## 2019-02-12 RX ADMIN — FLUTICASONE PROPIONATE 2 SPRAY: 50 SPRAY, METERED NASAL at 09:02

## 2019-02-12 RX ADMIN — CHLORHEXIDINE GLUCONATE 10 ML: 1.2 RINSE ORAL at 17:06

## 2019-02-12 RX ADMIN — POTASSIUM CHLORIDE 20 MEQ: 20 TABLET, EXTENDED RELEASE ORAL at 08:45

## 2019-02-12 RX ADMIN — METOPROLOL TARTRATE 25 MG: 25 TABLET ORAL at 21:50

## 2019-02-12 RX ADMIN — Medication 10 ML: at 05:45

## 2019-02-12 RX ADMIN — FLUTICASONE PROPIONATE 2 SPRAY: 50 SPRAY, METERED NASAL at 17:06

## 2019-02-12 RX ADMIN — INSULIN GLARGINE 20 UNITS: 100 INJECTION, SOLUTION SUBCUTANEOUS at 21:50

## 2019-02-12 RX ADMIN — MUPIROCIN: 20 OINTMENT TOPICAL at 17:06

## 2019-02-12 RX ADMIN — METOPROLOL TARTRATE 25 MG: 25 TABLET ORAL at 08:45

## 2019-02-12 RX ADMIN — GUAIFENESIN 1200 MG: 600 TABLET, EXTENDED RELEASE ORAL at 21:50

## 2019-02-12 RX ADMIN — MUPIROCIN: 20 OINTMENT TOPICAL at 09:02

## 2019-02-12 RX ADMIN — PANTOPRAZOLE SODIUM 40 MG: 40 TABLET, DELAYED RELEASE ORAL at 08:45

## 2019-02-12 RX ADMIN — FUROSEMIDE 40 MG: 40 TABLET ORAL at 08:45

## 2019-02-12 RX ADMIN — INSULIN LISPRO 3 UNITS: 100 INJECTION, SOLUTION INTRAVENOUS; SUBCUTANEOUS at 11:30

## 2019-02-12 RX ADMIN — AMIODARONE HYDROCHLORIDE 400 MG: 200 TABLET ORAL at 08:44

## 2019-02-12 RX ADMIN — ASPIRIN 81 MG: 81 TABLET, COATED ORAL at 08:45

## 2019-02-12 RX ADMIN — INSULIN LISPRO 3 UNITS: 100 INJECTION, SOLUTION INTRAVENOUS; SUBCUTANEOUS at 09:00

## 2019-02-12 RX ADMIN — AMIODARONE HYDROCHLORIDE 400 MG: 200 TABLET ORAL at 16:11

## 2019-02-12 RX ADMIN — GUAIFENESIN 1200 MG: 600 TABLET, EXTENDED RELEASE ORAL at 11:25

## 2019-02-12 RX ADMIN — DOCUSATE SODIUM 100 MG: 100 CAPSULE, LIQUID FILLED ORAL at 18:24

## 2019-02-12 NOTE — PROGRESS NOTES
ARU/IPR REFERRAL CONTACT NOTE [id number] [place] [address] for Physical Rehabilitation RE: Tamara Reynoso Thank you for the opportunity to review this patient's case for admission to [address] [place] [address] for Physical Rehabilitation. Based on our pre-admission screening:  
 
[x ] This patient does not meet criteria for admission to [facility] for Physical Rehabilitation due to: 
 
[x ] Too functional, per documentation, patient does not require both Physical and Occupational Therapy Services at an acute rehabilitation level of intensity. Again, Thank you for this referral. Should you have any questions please do not hesitate to call. Sincerely, Petra Swift Has Pet[facility] Swift [facility] 
[phone]

## 2019-02-12 NOTE — DIABETES MGMT
Glycemic Control Plan of Care 
 
T2DM with current A1c of 7.0% (02/03/2019). See separate notes, 02/05/2019, for assessment of home diabetes management and education. Home diabetes meds prior to admission: 
Actos 30 mg once daily. Glimepride 1 mg daily every morning Metformin 500 mg twice daily with meals. Bydureon 2 mg SQ every 7 days (Friday). 02/11/2019: Reviewed list of pre adm diabetes meds with patient and no change. POC BG range on 02/11/2019: 150-268 mg/dL. Increased basal lantus insulin dose from 10 to 20 units daily at bedtime. Continue on very resistant dose of correctional lispro insulin. POC BG report on 02/12/2019 at time of review: 137 mg/dL. Seen patient this morning. He ambulated with PT. Wife visiting at bedside. Recommendation(s): 
1.) Continue on current insulin orders/dose: lantus and correctional lispro. 2.) Resume pre adm diabetes meds at discharge: Actos, Glimepiride, Metformin, and Bydureon every 7 days (Friday). Assessment: 
Patient is 72year old with past medical history including type 2 diabetes mellitus, hypertension, and arthritis - was admitted on 02/03/2019 with report of chest pain. Noted: 
NSTEMI. 
CAD. S/P CABG Friday, 02/08/2019, 
T2DM with current A1c of 7.0% (02/03/2019). Most recent blood glucose values: 
 
Results for [facility]ona Vick (MRN [id number]) as of 2/12/2019 11:14 Ref. Range 2/11/2019 08:00 2/11/2019 11:52 2/11/2019 15:28 2/11/2019 21:07 GLUCOSE,FAST - POC Latest Ref Range: 70 - 110 mg/dL 171 (H) 222 (H) 150 (H) 268 (H) Results for Ramona Vick (MRN [id number]) as of 2/12/2019 11:14 Ref. Range 2/12/2019 08:39 GLUCOSE,FAST - POC Latest Ref Range: 70 - 110 mg/dL 167 (H) Current A1C: 7.23% (2/03/2019) which is equivalent to estimated average blood glucose of 163 mg/dL during the past 2-3 months. Current hospital diabetes medications: 
Basal lantus insulin 20 units daily at bedtime. Correctional lispro insulin ACHS. Very resistant dose. Total daily dose insulin requirement previous day: 02/11/2019 Lantus: 20 units Lispro: 21 units TDD insulin: 41 units Home diabetes medications: Patient reported on 02/05/2019:  
Actos 30 mg once daily. Glimepride 1 mg daily every morning Metformin 500 mg twice daily with meals. Bydureon 2 mg SQ every 7 days (Friday). Diet: Cardiac regular; consistent carb 2200kcal; no concentrated sweets. Goals:  Blood glucose will be within target range of  mg/dL by 02/15/2019. Education:  _X__  Refer to Diabetes Education Record: 02/05/2019 
           ___  Education not indicated at this time Conrad Kaminski RN [facility] Pager: [phone]

## 2019-02-12 NOTE — PROGRESS NOTES
CARDIAC SURGERY PROGRESS NOTE 
 
2019, 9:26 AM 
  
Post Operative Day # 4 Chart reviewed. Interval History/Events of Past 24 hours:  
Atrial arrhythmias have subsided. Sputum sent yesterday, poor sample. Subjective: 
Patient seen and examined on rounds today. - c/o productive cough reports sputum is more clear. Pain level: controlled Ambulating: well Sleeping: well Eating:  well Sternal pain: NO  
 
BM: Yes Objective: 
Vital signs:  
Visit Vitals BP (!) 130/119 Pulse 85 Temp 98.7 °F (37.1 °C) Resp 19 Ht 6' (1.829 m) Wt 95.2 kg (209 lb 14.4 oz) SpO2 94% BMI 28.47 kg/m² Temp (24hrs), Av.9 °F (37.2 °C), Min:98.6 °F (37 °C), Max:99.2 °F (37.3 °C) Admission Weight: Last Weight Weight: 97.5 kg (215 lb) Weight: 95.2 kg (209 lb 14.4 oz) Last 3 Recorded Weights in this Encounter 19 0600 02/10/19 0630 19 0193 Weight: 97 kg (213 lb 14.4 oz) 97.4 kg (214 lb 12.8 oz) 95.2 kg (209 lb 14.4 oz) Telemetry: NSR Physical Examination:    
General:  Alert, oriented Lungs: Clear to ascultation without rales, wheezes or rhonchi. Chest: Dressings clean and dry. Sternum stable. Heart: regular rate and rhythm, No murmur. Abdomen: Soft and non-tender without masses. Bowel sounds present. Extremities: Warm and well perfused. Edema mild. Incisions: clean and dry Neuro: No deficit. Beta-blocker: Yes ASA: Yes  
Statin: Yes ACE-I: No 
Diuretic: Yes  
 
SUP Prophylaxis: Pepcid DVT Prophylaxis:  
pneumatic compression boots: No 
Compression stockings:  Yes Enoxaparin:  Yes 
 
DME needs:  tbd Labs: 
Lab Results Component Value Date/Time WBC 8.6 2019 06:09 AM  
 HCT 34.1 (L) 2019 06:09 AM  
  2019 06:09 AM  
  
Lab Results Component Value Date/Time   2019 06:09 AM  
 K 4.2 2019 06:09 AM  
  2019 06:09 AM  
 CO2 25 2019 06:09 AM  
  (H) 2019 06:09 AM  
 BUN 19 (H) 02/11/2019 06:09 AM  
 CREA 1.06 02/11/2019 06:09 AM  
 CREA 1.05 02/09/2019 04:00 AM  
 CREA 1.29 02/08/2019 01:40 PM  
 
Lab Results Component Value Date/Time HBA1C 7.0 (H) 02/03/2019 01:42 AM  
 
 
Assessment: 
CAD S/P  CABG x 4 - on BB, ASA, statin, PO Amio Respiratory parameters stable Cardiac status stable DM2 on Lantus and SSI Plans: 
1. Add mucolytic, resend sputum if able 2. May be ready for home later today? Heart Hugger use encouraged to mitigate pain and will also assist with deep breathing and incentive spirometry goals. Possible discharge 1 days. Luis Chowdhury PA-C CVTS 
02/12/19, 9:26 AM 
 
 
 
ATTENDING SURGEON NOTE 
 
POD # 4 I saw and evaluated Camille Chapman. on rounds today. I discussed my assessment and the plans with the PAs on our service. He is resting comfortably in a chair and is feeling well. His incisional pain is under good control. He did sleep well last night. He is hungry and is eating well. Still with productive cough. Bowels are moving. He is ambulating well. He is up to 1500 cc on the bedside incentive spirometer. VS stable. Tmax 99.2 F. Lungs are clear to auscultation bilaterally, without wheezes and without rhonchi. The heart rate and rhythm were regular. Abdomen was soft, with bowel sounds present. His oxygen saturation was 94% on room air. Sputum culture pending, with multiple bacteria on gram stain (? contaminant). He is progressing well. Plans for today include: 
Advance oral intake. Ambulate in simpson. Start mucolytics. Possible discharge tomorrow. He verbalizes understanding and agreement with the plan.  
 
Loren Carlos MD

## 2019-02-12 NOTE — HOME CARE
Received HH referral; [facility] will follow for SN,PT,OT, CABG protocol; pt states he has a RW,cane,shower chair ,BSC and has access to a rollator if needed ,[facility] will follow. NICOLASA SALDIVAR.

## 2019-02-12 NOTE — PROGRESS NOTES
Shift Summary: 
 
Pt had an uneventful shift. Pt is A/O x4, ambulates well. On RA, productive cough, sputum sample sent down on dayshift, uses the IS well on his own and able to get up to 1500-1750ml. Pt has been SR with PACS occasional, no BP concerns. Pt states had BM yesterday, uses urinal. Incision to midsternum CDI. SKYE hose and SCDs to both legs; however pt requesting to have SCDs off at times to \"give his legs a break\". Some incisional pain when coughing, denies the need for pain medication. CHG bath this AM, new gown, linens, dressing removed for open to air POD #4.

## 2019-02-12 NOTE — PROGRESS NOTES
[facility] - Progress Note Admit Date: 2/3/2019 Assessment:  
 
Hospital Problems  Date Reviewed: 2/6/2019 Codes Class Noted POA  
 S/P CABG x 5 ICD-10-CM: Z95.1 ICD-9-CM: V45.81  2/11/2019 Unknown * (Principal) NSTEMI (non-ST elevated myocardial infarction) ([facility] [address] ICD-10-CM: I21.4 ICD-9-CM: 410.70  2/6/2019 Yes Chest pain ICD-10-CM: R07.9 ICD-9-CM: 786.50  2/3/2019 Unknown  
   
  
 
 
 
  
-CAD s/p CABG X5 (LIMA to LAD, SVG to Diag1/OM1, SVG to dRCA/PLB) 2/8/2019.  
-NSTEMI- with peak troponin at 9.  
-Post-operative atrial fibrillation/flutter. On BB and amiodarone with rates in the 80's. 
-Normal EF 60% by echo this admission. 
-Acute bronchitis- on abx -HTN- elevated on examination but says he has been stable at home. 
-DM Type II on oral meds 
-Dyslipidemia- on statin 
-Renal insult 
  
-No primary cardiologist, Dr. Jacinta Wall PMD 
 
 
Plan:  
 
Main complaint is productive cough, although improved since preop. Continues to increase activity. Continued on ASA, statin, BB. Continued on amio taper. Dispo planning. Subjective:  
 
Pain with coughing. Sinus on tele. Still with productive cough. Objective:  
  
Patient Vitals for the past 8 hrs: 
 Temp Pulse Resp BP SpO2  
02/12/19 0800  85 19 (!) 130/119 94 % 02/12/19 0700  80 16 134/82 93 % 02/12/19 0627  80 16 131/78   
02/12/19 0600  82 14 152/82 93 % 02/12/19 0500  72 15 145/72 94 % 02/12/19 0400 98.7 °F (37.1 °C) 73 17 130/76 92 % 02/12/19 0300  72 14 131/80 92 % 02/12/19 0200  75 15 131/76 92 % Patient Vitals for the past 96 hrs: 
 Weight  
02/12/19 0643 95.2 kg (209 lb 14.4 oz) 02/10/19 0630 97.4 kg (214 lb 12.8 oz) 02/09/19 0600 97 kg (213 lb 14.4 oz) Intake/Output Summary (Last 24 hours) at 2/12/2019 3840 Last data filed at 2/12/2019 0600 Gross per 24 hour Intake 360 ml Output 1675 ml Net -1315 ml Physical Exam: General:  alert, cooperative, no distress, appears stated age Neck:  no JVD Lungs:  clear to auscultation bilaterally Heart:  regular rate and rhythm Abdomen:  abdomen is soft without significant tenderness, masses, organomegaly or guarding Extremities:  extremities normal, atraumatic, no cyanosis or edema Data Review:  
 
Labs: Results:  
   
Chemistry Recent Labs  
  02/11/19 
4188 *   
K 4.2  CO2 25 BUN 19* CREA 1.06  
CA 8.0* AGAP 8  
BUCR 18 CBC w/Diff Recent Labs  
  02/11/19 
4457 WBC 8.6  
RBC 3.98* HGB 11.6* HCT 34.1*  
 GRANS 73 LYMPH 17* EOS 0 Cardiac Enzymes No results found for: CPK, CK, CKMMB, CKMB, RCK3, CKMBT, CKNDX, CKND1, CANDI, TROPT, TROIQ, GERMAINE, TROPT, TNIPOC, BNP, BNPP Coagulation No results for input(s): PTP, INR, APTT in the last 72 hours. No lab exists for component: INREXT Lipid Panel Lab Results Component Value Date/Time Cholesterol, total 100 02/04/2019 02:15 AM  
 HDL Cholesterol 50 02/04/2019 02:15 AM  
 LDL, calculated 33.8 02/04/2019 02:15 AM  
 VLDL, calculated 16.2 02/04/2019 02:15 AM  
 Triglyceride 81 02/04/2019 02:15 AM  
 CHOL/HDL Ratio 2.0 02/04/2019 02:15 AM  
  
BNP No results found for: BNP, BNPP, XBNPT Liver Enzymes No results for input(s): TP, ALB, TBIL, AP, SGOT, GPT in the last 72 hours. No lab exists for component: DBIL Digoxin Thyroid Studies Lab Results Component Value Date/Time TSH 0.45 02/03/2019 03:57 PM  
    
 
Signed By: JOSE Puri February 12, 2019

## 2019-02-13 VITALS
BODY MASS INDEX: 27.97 KG/M2 | WEIGHT: 206.5 LBS | TEMPERATURE: 98.3 F | DIASTOLIC BLOOD PRESSURE: 75 MMHG | RESPIRATION RATE: 16 BRPM | OXYGEN SATURATION: 94 % | HEIGHT: 72 IN | HEART RATE: 76 BPM | SYSTOLIC BLOOD PRESSURE: 132 MMHG

## 2019-02-13 LAB — GLUCOSE BLD STRIP.AUTO-MCNC: 156 MG/DL (ref 70–110)

## 2019-02-13 PROCEDURE — 74011636637 HC RX REV CODE- 636/637: Performed by: PHYSICIAN ASSISTANT

## 2019-02-13 PROCEDURE — 82962 GLUCOSE BLOOD TEST: CPT

## 2019-02-13 PROCEDURE — 74011250637 HC RX REV CODE- 250/637: Performed by: THORACIC SURGERY (CARDIOTHORACIC VASCULAR SURGERY)

## 2019-02-13 PROCEDURE — 74011250637 HC RX REV CODE- 250/637: Performed by: INTERNAL MEDICINE

## 2019-02-13 PROCEDURE — 74011250637 HC RX REV CODE- 250/637: Performed by: PHYSICIAN ASSISTANT

## 2019-02-13 RX ORDER — FUROSEMIDE 40 MG/1
40 TABLET ORAL DAILY
Qty: 7 TAB | Refills: 0 | Status: SHIPPED | OUTPATIENT
Start: 2019-02-13 | End: 2019-03-13

## 2019-02-13 RX ORDER — METOPROLOL TARTRATE 25 MG/1
25 TABLET, FILM COATED ORAL EVERY 12 HOURS
Qty: 60 TAB | Refills: 2 | Status: SHIPPED | OUTPATIENT
Start: 2019-02-13 | End: 2019-03-13 | Stop reason: SDUPTHER

## 2019-02-13 RX ORDER — POTASSIUM CHLORIDE 20 MEQ/1
20 TABLET, EXTENDED RELEASE ORAL DAILY
Qty: 7 TAB | Refills: 0 | Status: SHIPPED | OUTPATIENT
Start: 2019-02-14 | End: 2019-03-13

## 2019-02-13 RX ADMIN — PANTOPRAZOLE SODIUM 40 MG: 40 TABLET, DELAYED RELEASE ORAL at 08:31

## 2019-02-13 RX ADMIN — FUROSEMIDE 40 MG: 40 TABLET ORAL at 09:00

## 2019-02-13 RX ADMIN — ATORVASTATIN CALCIUM 40 MG: 40 TABLET, FILM COATED ORAL at 08:30

## 2019-02-13 RX ADMIN — CHLORHEXIDINE GLUCONATE 10 ML: 1.2 RINSE ORAL at 08:49

## 2019-02-13 RX ADMIN — Medication 10 ML: at 06:00

## 2019-02-13 RX ADMIN — MUPIROCIN: 20 OINTMENT TOPICAL at 08:49

## 2019-02-13 RX ADMIN — FLUTICASONE PROPIONATE 2 SPRAY: 50 SPRAY, METERED NASAL at 08:49

## 2019-02-13 RX ADMIN — DOCUSATE SODIUM 100 MG: 100 CAPSULE, LIQUID FILLED ORAL at 08:30

## 2019-02-13 RX ADMIN — AMIODARONE HYDROCHLORIDE 400 MG: 200 TABLET ORAL at 08:30

## 2019-02-13 RX ADMIN — GUAIFENESIN 1200 MG: 600 TABLET, EXTENDED RELEASE ORAL at 08:29

## 2019-02-13 RX ADMIN — POTASSIUM CHLORIDE 20 MEQ: 20 TABLET, EXTENDED RELEASE ORAL at 08:29

## 2019-02-13 RX ADMIN — ASPIRIN 81 MG: 81 TABLET, COATED ORAL at 08:30

## 2019-02-13 RX ADMIN — INSULIN LISPRO 3 UNITS: 100 INJECTION, SOLUTION INTRAVENOUS; SUBCUTANEOUS at 08:47

## 2019-02-13 RX ADMIN — METOPROLOL TARTRATE 25 MG: 25 TABLET ORAL at 08:30

## 2019-02-13 NOTE — NURSE NAVIGATOR
Discharge order noted for today. Pt has been accepted to [facility] agency. Met with pt and he is agreeable to the transition plan today. Transport has been arranged through wife. Pt's discharge summary and [facility] orders have been forwarded to [facility] agency via [facility]. Discharge information has been documented on the AVS. Mejia Casas. MONICA ReedN, RN Care Management [phone]

## 2019-02-13 NOTE — DISCHARGE INSTRUCTIONS
Please CALL Dr. Mares Bran office with any questions or concerns [phone].   - Wear Heart Hugger daily.   - Shower daily. Wash incisions with soap and water and pat dry. Keep lower leg incision dressed while draining.   - Avoid constipation take Colace and/or Miralax  - No heavy lifting. No driving until cleared by MD and while taking pain medications. - Follow up with Dr. Alba Wilson in 1 week and 1 month. Follow up with PCP and Cardiology in 3 weeks.

## 2019-02-13 NOTE — PROGRESS NOTES
Neuro intact, afebrile, no c/o pain NSR on the monitor, BP WNL, complaint with heart hugger On RA. Progressing well with ICS independently to 1500. No BM this shift. Per pt, appetite still poor. Total urine output 1050cc No skin issues, incisions healing well free from s/s infection

## 2019-02-13 NOTE — PROGRESS NOTES
Pt received discharge instructions, spouse in attendance. Pt and spouse return demonstrated application and use of Heart Hugger and Incentive Spirometer with teachback on pts activity requirements. Pt received red armband with Dr. Paul Hwang' office number, pt or spouse to contact this number from any medical needs or questions/concerns relating to pt postoperative care. Pt received discharge instructions, pt and spouse verbalized an understanding of same. Pt discharged via wheelchair accompanied by CVT ICU staff to family vehicle where spouse ensured pt appropriately safety belted into car.

## 2019-02-13 NOTE — DISCHARGE SUMMARY
CARDIAC SURGERY DISCHARGE SUMMARY    2/14/2019  9:27 AM    CHIEF COMPLAINT: chest pain      HISTORY OF PRESENT ILLNESS:  Warden Morales is a 72 y.o. male who presented with chest pain. He was found to have a NSTEMI. Echocardiography revealed good left ventricular function. Cardiac catheterization showed 3-vessel coronary artery disease. He was admitted for further evaluation. PAST MEDICAL HISTORY:   Past Medical History:   Diagnosis Date    Arthritis     Diabetes ([address]     Hypertension        PAST SURGICAL HISTORY:   Past Surgical History:   Procedure Laterality Date    HX HEENT      tumor removed from neck    HX ORTHOPAEDIC      elbow     LAP,CHOLECYSTECTOMY  11/12/15    Dr. Uzma Torres      back surgery x2       HOSPITAL COURSE:  He was admitted to the hospital and underwent coronary artery bypass grafting x 5 on 2/8/19. He was extubated the night of surgery and was eventually up and ambulating well and taking a diet well. Wires and tubes were removed. Notable postoperative events included a productive cough with negative culture. He is to be discharged to home today. At the time of discharge, his lungs were clear to auscultation bilaterally and the heart had a regular rate and rhythm. The sternum was stable and all wounds were healing well with no evidence of infection. There was no pedal edema. Room air oximetry was 94%. LABS:  Lab Results   Component Value Date/Time    WBC 8.6 02/11/2019 06:09 AM    HCT 34.1 (L) 02/11/2019 06:09 AM     02/11/2019 06:09 AM      Lab Results   Component Value Date/Time     02/11/2019 06:09 AM    K 4.2 02/11/2019 06:09 AM     02/11/2019 06:09 AM    CO2 25 02/11/2019 06:09 AM     (H) 02/11/2019 06:09 AM    BUN 19 (H) 02/11/2019 06:09 AM    CREA 1.06 02/11/2019 06:09 AM    CREA 1.05 02/09/2019 04:00 AM    CREA 1.29 02/08/2019 01:40 PM       DISCHARGE DIAGNOSES:    Diagnoses:  1.  Coronary artery disease (symptomatic, progressive, severe)  2. Non-ST-elevation myocardial infarction   3. Essential hypertension  4. hypercholesterolemia  5. diabetes mellitus type-2     DISCHARGE DISPOSITION:  1. Activities: limited, with strict sternal precautions including no heavy lifting and with constant wearing of the sternal harness. 2. Diet: Cardiac Diet and Diabetic Diet  3. Condition: good  4. Prognosis:  good    DISCHARGE INSTRUCTIONS:  He is to follow up with the cardiac surgeon in 7 days and in one month, and will see his primary care physician and cardiologist preferably within one month. The visiting nurses will see him once home. DISCHARGE MEDICATIONS:      Start Taking Aspirin 81 mg 1 tab by mouth daily. Discharge Medication List as of 2/13/2019 12:51 PM      START taking these medications    Details   furosemide (LASIX) 40 mg tablet Take 1 Tab by mouth daily. , Print, Disp-7 Tab, R-0      potassium chloride (K-DUR, KLOR-CON) 20 mEq tablet Take 1 Tab by mouth daily. , Print, Disp-7 Tab, R-0      metoprolol tartrate (LOPRESSOR) 25 mg tablet Take 1 Tab by mouth every twelve (12) hours. , Print, Disp-60 Tab, R-2      HYDROcodone-acetaminophen (NORCO) 5-325 mg per tablet Take 1 Tab by mouth every six (6) hours as needed. Max Daily Amount: 4 Tabs., Print, Disp-28 Tab, R-0      docusate sodium (COLACE) 100 mg capsule Take 1 Cap by mouth two (2) times daily as needed for Constipation for up to 7 days. , Print, Disp-14 Cap, R-0         CONTINUE these medications which have NOT CHANGED    Details   multivitamin (ONE A DAY) tablet Take 1 Tab by mouth daily. , Historical Med      PROAIR HFA 90 mcg/actuation inhaler 2 Puffs by Mouth/Throat route every six (6) hours as needed., Historical Med, GABRIELLA      fluticasone (FLONASE) 50 mcg/actuation nasal spray 1 [place] by Both Nostrils route two (2) times a day., Historical Med      pioglitazone (ACTOS) 30 mg tablet Take 30 mg by mouth daily. , Historical Med      glimepiride (AMARYL) 1 mg tablet Take 1 mg by mouth every morning. Indications: TYPE 2 DIABETES MELLITUS, Historical Med      atorvastatin (LIPITOR) 40 mg tablet Take 40 mg by mouth daily. , Historical Med      exenatide microspheres (BYDUREON) 2 mg serr 2 mg by SubCUTAneous route every seven (7) days. , Historical Med      omeprazole (PRILOSEC) 20 mg capsule Take 20 mg by mouth daily. , Historical Med      METFORMIN HCL (METFORMIN PO) Take  by mouth., Historical Med         STOP taking these medications       olmesartan (BENICAR) 20 mg tablet Comments:   Reason for Stopping:         meloxicam (MOBIC) 15 mg tablet Comments:   Reason for Stopping:               Dena Lackey MD

## 2019-02-13 NOTE — HOME CARE
Discharge noted for today, [address] will follow for SN,PT,OT,C[facility] ; [facility] to William Guerra at [address] central intake. NICOLASA SALDIVAR.

## 2019-02-14 ENCOUNTER — TELEPHONE (OUTPATIENT)
Dept: CARDIOTHORACIC SURGERY | Age: 74
End: 2019-02-14

## 2019-02-14 ENCOUNTER — PATIENT OUTREACH (OUTPATIENT)
Dept: CASE MANAGEMENT | Age: 74
End: 2019-02-14

## 2019-02-14 RX ORDER — ASPIRIN 81 MG/1
81 TABLET ORAL DAILY
Qty: 30 TAB | Refills: 2 | Status: SHIPPED | OUTPATIENT
Start: 2019-02-14

## 2019-02-14 NOTE — PROGRESS NOTES
Hospital Discharge Follow-Up Date/Time:  2019 10:54 AM 
 
Patient was admitted to [facility] on 2/3/19 and discharged on 19 for NSTEMI, and Cabg x 5. The physician discharge summary was available at the time of outreach. Patient was contacted within 1 business days of discharge. Top Challenges reviewed with the provider Strict sternal precautions, constant wearing of sternal harness Cardiac diet and Diabetic Diet Incentive Spirometry use Method of communication with provider :phone Inpatient RRAT score: 5 Was this a readmission? no 
 
 
Nurse Navigator (NN) contacted the patient by telephone to perform post hospital discharge assessment. Verified name and  with patient as identifiers. Provided introduction to self, and explanation of the Nurse Navigator role. Reviewed discharge instructions and red flags with patient who verbalized understanding. Patient given an opportunity to ask questions and does not have any further questions or concerns at this time. The patient agrees to contact the PCP office for questions related to their healthcare. NN provided contact information for future reference. Disease Specific:   CAD Summary of patient's top problems: 1. HTN 2. Diabetes Home Health orders at discharge: PT, OT, SN Home Health company: [facility] Date of initial visit: TBD Durable Medical Equipment ordered/company: NONE Durable Medical Equipment received: N/A Barriers to care? lack of knowledge about disease Advance Care Planning:  
Does patient have an Advance Directive:  not on file Medication(s):  
New Medications at Discharge: Hydrocodone-acetaminophen (NORCO) 5-325 mg, docusate sodium (COLACE) 100 MG Changed Medications at Discharge: None Discontinued Medications at Discharge: olmesartan (BENICAR) 20 mg, meloxicam (MOBIC) 15 mg Medication reconciliation was performed with patient, who verbalizes understanding of administration of home medications. There were no barriers to obtaining medications identified at this time. Referral to Pharm D needed: no  
 
Current Outpatient Medications Medication Sig  furosemide (LASIX) 40 mg tablet Take 1 Tab by mouth daily.  potassium chloride (K-DUR, KLOR-CON) 20 mEq tablet Take 1 Tab by mouth daily.  metoprolol tartrate (LOPRESSOR) 25 mg tablet Take 1 Tab by mouth every twelve (12) hours.  HYDROcodone-acetaminophen (NORCO) 5-325 mg per tablet Take 1 Tab by mouth every six (6) hours as needed. Max Daily Amount: 4 Tabs.  docusate sodium (COLACE) 100 mg capsule Take 1 Cap by mouth two (2) times daily as needed for Constipation for up to 7 days.  multivitamin (ONE A DAY) tablet Take 1 Tab by mouth daily.  PROAIR HFA 90 mcg/actuation inhaler 2 Puffs by Mouth/Throat route every six (6) hours as needed.  fluticasone (FLONASE) 50 mcg/actuation nasal spray 1 [place] by Both Nostrils route two (2) times a day.  pioglitazone (ACTOS) 30 mg tablet Take 30 mg by mouth daily.  glimepiride (AMARYL) 1 mg tablet Take 1 mg by mouth every morning. Indications: TYPE 2 DIABETES MELLITUS  
 atorvastatin (LIPITOR) 40 mg tablet Take 40 mg by mouth daily.  exenatide microspheres (BYDUREON) 2 mg serr 2 mg by SubCUTAneous route every seven (7) days.  omeprazole (PRILOSEC) 20 mg capsule Take 20 mg by mouth daily.  METFORMIN HCL (METFORMIN PO) Take  by mouth. No current facility-administered medications for this visit. There are no discontinued medications. [facility] follow up appointment(s): No future appointments. Non-[facility] follow up appointment(s): Patient wants to make appt with PCP and Cardiologist once seen by Cardiothoracic Surgeon. [facility]:  out of service area Goals  Knowledge and adherence medication (ie. action, side effects, missed dose, etc.) Patient to provide teach back on medications on/by 3/14/19.  Prevent complications post hospitalization. Patient to attend follow up appointments as scheduled on/by 3/14/19.  Understands red flags post discharge. Patient able to verbalize signs and symptoms of infection or complications post Cabg surgery on/by 3/14/19.

## 2019-02-14 NOTE — TELEPHONE ENCOUNTER
Pts wife called with a concern regarding her s medication. She stated that he usually takes Meloxicam but she didnt see it on the list of medications he could have and wanted to know if it was okay for him to take it. Advised her that I would relay message to  Daily and I would have someone call her back.  ELLIOTT 02/14/2019

## 2019-02-15 ENCOUNTER — TELEPHONE (OUTPATIENT)
Dept: CARDIOTHORACIC SURGERY | Age: 74
End: 2019-02-15

## 2019-02-15 ENCOUNTER — HOME CARE VISIT (OUTPATIENT)
Dept: SCHEDULING | Facility: HOME HEALTH | Age: 74
End: 2019-02-15
Payer: MEDICARE

## 2019-02-15 ENCOUNTER — HOME CARE VISIT (OUTPATIENT)
Dept: HOME HEALTH SERVICES | Facility: HOME HEALTH | Age: 74
End: 2019-02-15

## 2019-02-15 VITALS — DIASTOLIC BLOOD PRESSURE: 70 MMHG | SYSTOLIC BLOOD PRESSURE: 130 MMHG | OXYGEN SATURATION: 94 % | HEART RATE: 78 BPM

## 2019-02-15 PROCEDURE — G0299 HHS/HOSPICE OF RN EA 15 MIN: HCPCS

## 2019-02-15 PROCEDURE — 400013 HH SOC

## 2019-02-15 PROCEDURE — 3331090002 HH PPS REVENUE DEBIT

## 2019-02-15 PROCEDURE — G0151 HHCP-SERV OF PT,EA 15 MIN: HCPCS

## 2019-02-15 PROCEDURE — 3331090001 HH PPS REVENUE CREDIT

## 2019-02-16 ENCOUNTER — HOME CARE VISIT (OUTPATIENT)
Dept: SCHEDULING | Facility: HOME HEALTH | Age: 74
End: 2019-02-16
Payer: MEDICARE

## 2019-02-16 VITALS
RESPIRATION RATE: 16 BRPM | HEART RATE: 80 BPM | DIASTOLIC BLOOD PRESSURE: 76 MMHG | SYSTOLIC BLOOD PRESSURE: 128 MMHG | TEMPERATURE: 97 F | OXYGEN SATURATION: 97 %

## 2019-02-16 PROCEDURE — G0299 HHS/HOSPICE OF RN EA 15 MIN: HCPCS

## 2019-02-16 PROCEDURE — 3331090001 HH PPS REVENUE CREDIT

## 2019-02-16 PROCEDURE — 3331090002 HH PPS REVENUE DEBIT

## 2019-02-17 ENCOUNTER — HOME CARE VISIT (OUTPATIENT)
Dept: SCHEDULING | Facility: HOME HEALTH | Age: 74
End: 2019-02-17
Payer: MEDICARE

## 2019-02-17 VITALS
DIASTOLIC BLOOD PRESSURE: 80 MMHG | SYSTOLIC BLOOD PRESSURE: 122 MMHG | TEMPERATURE: 97 F | HEART RATE: 82 BPM | RESPIRATION RATE: 16 BRPM

## 2019-02-17 VITALS
DIASTOLIC BLOOD PRESSURE: 78 MMHG | RESPIRATION RATE: 16 BRPM | HEART RATE: 82 BPM | SYSTOLIC BLOOD PRESSURE: 122 MMHG | TEMPERATURE: 98 F | OXYGEN SATURATION: 98 %

## 2019-02-17 PROCEDURE — 3331090002 HH PPS REVENUE DEBIT

## 2019-02-17 PROCEDURE — 3331090001 HH PPS REVENUE CREDIT

## 2019-02-17 PROCEDURE — G0299 HHS/HOSPICE OF RN EA 15 MIN: HCPCS

## 2019-02-18 ENCOUNTER — HOME CARE VISIT (OUTPATIENT)
Dept: HOME HEALTH SERVICES | Facility: HOME HEALTH | Age: 74
End: 2019-02-18
Payer: MEDICARE

## 2019-02-18 ENCOUNTER — HOME CARE VISIT (OUTPATIENT)
Dept: SCHEDULING | Facility: HOME HEALTH | Age: 74
End: 2019-02-18
Payer: MEDICARE

## 2019-02-18 PROCEDURE — 3331090002 HH PPS REVENUE DEBIT

## 2019-02-18 PROCEDURE — 3331090001 HH PPS REVENUE CREDIT

## 2019-02-18 PROCEDURE — G0299 HHS/HOSPICE OF RN EA 15 MIN: HCPCS

## 2019-02-19 ENCOUNTER — TELEPHONE (OUTPATIENT)
Dept: CARDIOTHORACIC SURGERY | Age: 74
End: 2019-02-19

## 2019-02-19 VITALS
TEMPERATURE: 97.6 F | OXYGEN SATURATION: 94 % | DIASTOLIC BLOOD PRESSURE: 76 MMHG | HEART RATE: 85 BPM | RESPIRATION RATE: 18 BRPM | SYSTOLIC BLOOD PRESSURE: 127 MMHG

## 2019-02-19 PROCEDURE — 3331090002 HH PPS REVENUE DEBIT

## 2019-02-19 PROCEDURE — 3331090001 HH PPS REVENUE CREDIT

## 2019-02-19 NOTE — TELEPHONE ENCOUNTER
Iveth James to pt to confirm appt for Wednesday 02/20/2019 at 2 pm.  Confirmed location with patient   [facility] 02/19/2019

## 2019-02-20 ENCOUNTER — HOME CARE VISIT (OUTPATIENT)
Dept: HOME HEALTH SERVICES | Facility: HOME HEALTH | Age: 74
End: 2019-02-20
Payer: MEDICARE

## 2019-02-20 ENCOUNTER — HOSPITAL ENCOUNTER (OUTPATIENT)
Dept: GENERAL RADIOLOGY | Age: 74
Discharge: HOME | End: 2019-02-20
Payer: MEDICARE

## 2019-02-20 ENCOUNTER — HOSPITAL ENCOUNTER (OUTPATIENT)
Dept: LAB | Age: 74
Discharge: HOME | End: 2019-02-20
Payer: MEDICARE

## 2019-02-20 ENCOUNTER — PATIENT OUTREACH (OUTPATIENT)
Dept: CASE MANAGEMENT | Age: 74
End: 2019-02-20

## 2019-02-20 ENCOUNTER — OFFICE VISIT (OUTPATIENT)
Dept: CARDIOTHORACIC SURGERY | Age: 74
End: 2019-02-20

## 2019-02-20 VITALS
WEIGHT: 203 LBS | BODY MASS INDEX: 27.5 KG/M2 | HEIGHT: 72 IN | TEMPERATURE: 97.9 F | OXYGEN SATURATION: 98 % | SYSTOLIC BLOOD PRESSURE: 122 MMHG | DIASTOLIC BLOOD PRESSURE: 79 MMHG | RESPIRATION RATE: 18 BRPM | HEART RATE: 88 BPM

## 2019-02-20 DIAGNOSIS — Z95.1 S/P CABG X 5: Primary | ICD-10-CM

## 2019-02-20 DIAGNOSIS — Z95.1 S/P CABG X 5: ICD-10-CM

## 2019-02-20 PROCEDURE — 93005 ELECTROCARDIOGRAM TRACING: CPT

## 2019-02-20 PROCEDURE — 3331090001 HH PPS REVENUE CREDIT

## 2019-02-20 PROCEDURE — 3331090002 HH PPS REVENUE DEBIT

## 2019-02-20 PROCEDURE — 71046 X-RAY EXAM CHEST 2 VIEWS: CPT

## 2019-02-20 NOTE — PROGRESS NOTES
Hospital Discharge Follow-Up Date/Time:  2019 10:54 AM 
 
Patient was admitted to [facility] on 2/3/19 and discharged on 19 for NSTEMI, and Cabg x 5. The physician discharge summary was available at the time of outreach. Patient was contacted within 1 business days of discharge. Top Challenges reviewed with the provider Strict sternal precautions, constant wearing of sternal harness Cardiac diet and Diabetic Diet Incentive Spirometry use Method of communication with provider :phone Inpatient RRAT score: 5 Was this a readmission? no 
 
 
Nurse Navigator (NN) contacted the patient by telephone to perform post hospital discharge assessment. Verified name and  with patient as identifiers. Provided introduction to self, and explanation of the Nurse Navigator role. Reviewed discharge instructions and red flags with patient who verbalized understanding. Patient given an opportunity to ask questions and does not have any further questions or concerns at this time. The patient agrees to contact the PCP office for questions related to their healthcare. NN provided contact information for future reference. Disease Specific:   CAD Summary of patient's top problems: 1. HTN 2. Diabetes Home Health orders at discharge: PT, OT, SN Home Health company: [facility] Date of initial visit: TBD Durable Medical Equipment ordered/company: NONE Durable Medical Equipment received: N/A Barriers to care? lack of knowledge about disease Advance Care Planning:  
Does patient have an Advance Directive:  not on file Medication(s):  
New Medications at Discharge: Hydrocodone-acetaminophen (NORCO) 5-325 mg, docusate sodium (COLACE) 100 MG Changed Medications at Discharge: None Discontinued Medications at Discharge: olmesartan (BENICAR) 20 mg, meloxicam (MOBIC) 15 mg Medication reconciliation was performed with patient, who verbalizes understanding of administration of home medications. There were no barriers to obtaining medications identified at this time. Referral to Pharm D needed: no  
 
Current Outpatient Medications Medication Sig  
 aspirin delayed-release 81 mg tablet Take 1 Tab by mouth daily.  furosemide (LASIX) 40 mg tablet Take 1 Tab by mouth daily.  potassium chloride (K-DUR, KLOR-CON) 20 mEq tablet Take 1 Tab by mouth daily.  metoprolol tartrate (LOPRESSOR) 25 mg tablet Take 1 Tab by mouth every twelve (12) hours.  HYDROcodone-acetaminophen (NORCO) 5-325 mg per tablet Take 1 Tab by mouth every six (6) hours as needed. Max Daily Amount: 4 Tabs.  multivitamin (ONE A DAY) tablet Take 1 Tab by mouth daily.  PROAIR HFA 90 mcg/actuation inhaler 2 Puffs by Mouth/Throat route every six (6) hours as needed.  fluticasone (FLONASE) 50 mcg/actuation nasal spray 1 [place] by Both Nostrils route two (2) times a day.  pioglitazone (ACTOS) 30 mg tablet Take 30 mg by mouth daily.  glimepiride (AMARYL) 1 mg tablet Take 1 mg by mouth every morning. Indications: TYPE 2 DIABETES MELLITUS  
 atorvastatin (LIPITOR) 40 mg tablet Take 40 mg by mouth daily.  exenatide microspheres (BYDUREON) 2 mg serr 2 mg by SubCUTAneous route every seven (7) days.  omeprazole (PRILOSEC) 20 mg capsule Take 20 mg by mouth daily.  METFORMIN HCL (METFORMIN PO) Take  by mouth. No current facility-administered medications for this visit. There are no discontinued medications. [facility] follow up appointment(s):  
Future Appointments Date Time Provider Shannan Gallegos 2/21/2019 To Be Determined NISH Bazzi  
2/26/2019 To Be Determined IVETH Negrete  
3/5/2019 To Be Determined Anali Jang RN [id number]  
3/12/2019 To Be Determined IVETH Negrete  
3/13/2019  2:00 PM Carlos Hugo MD [id number] [facility]  
  
 Non-[facility] follow up appointment(s): Patient wants to make appt with PCP and Cardiologist once seen by Cardiothoracic Surgeon. [facility]:  out of service area Goals  Knowledge and adherence medication (ie. action, side effects, missed dose, etc.) Patient to provide teach back on medications on/by 3/14/19.  Understands red flags post discharge. Patient able to verbalize signs and symptoms of infection or complications post Cabg surgery on/by 3/14/19.

## 2019-02-20 NOTE — LETTER
2019 Patient:  Alvina Bautista. MRN:     [id number] :     1945 Dear Harshil: 
 
 I had the pleasure of seeing Alvina Ramey in clinic today at [facility] in follow-up from open-heart surgery here. He has been doing well, and denies angina and dyspnea. Sternal pain is absent. He has noted that his activity level has improved. On examination, his vital signs were stable. The lungs were clear to auscultation bilaterally, and his heart had a regular rate and rhythm. The sternum was stable, and the sternal and leg wounds were clean and dry without drainage, healing well without signs of infection. Pedal edema has improved. The EKG shows normal sinus rhythm with no ischemic changes, and the chest x-ray shows clear lungs bilaterally without significant residual effusions. Overall, he appears to be healing well following open-heart surgery and is pleased with the outcome of the operation as am I.  I have asked him to follow-up with you and his Primary Care Physician in the future, and I will be seeing him in clinic again in three weeks. Thanks very much for involving me in his care, and if you have any questions about his case, please feel free to contact me. Best regards, Neville Mckay MD 
Medical Director, Cardiovascular and Thoracic Services [facility] [place] [facility]

## 2019-02-20 NOTE — PROGRESS NOTES
CARDIAC SURGERY FOLLOW-UP NOTE    2/20/2019  2:27 PM    Subjective:   Mery Peeling. returns for a follow-up visit following CABG x 5. He feels well, and feels that overall he is improving. Angina is absent. Shortness of breath is absent. Sternal pain is absent. Stamina has significantly improved, now able to perform several daily activities without restriction. Eating well. Bowel movements regular. Current medications include:  Current Outpatient Medications   Medication Sig Dispense Refill    aspirin delayed-release 81 mg tablet Take 1 Tab by mouth daily. 30 Tab 2    furosemide (LASIX) 40 mg tablet Take 1 Tab by mouth daily. 7 Tab 0    potassium chloride (K-DUR, KLOR-CON) 20 mEq tablet Take 1 Tab by mouth daily. 7 Tab 0    metoprolol tartrate (LOPRESSOR) 25 mg tablet Take 1 Tab by mouth every twelve (12) hours. 60 Tab 2    multivitamin (ONE A DAY) tablet Take 1 Tab by mouth daily.  PROAIR HFA 90 mcg/actuation inhaler 2 Puffs by Mouth/Throat route every six (6) hours as needed.  fluticasone (FLONASE) 50 mcg/actuation nasal spray 1 [place] by Both Nostrils route two (2) times a day.  pioglitazone (ACTOS) 30 mg tablet Take 30 mg by mouth daily.  glimepiride (AMARYL) 1 mg tablet Take 1 mg by mouth every morning. Indications: TYPE 2 DIABETES MELLITUS      atorvastatin (LIPITOR) 40 mg tablet Take 40 mg by mouth daily.  exenatide microspheres (BYDUREON) 2 mg serr 2 mg by SubCUTAneous route every seven (7) days.  omeprazole (PRILOSEC) 20 mg capsule Take 20 mg by mouth daily.  METFORMIN HCL (METFORMIN PO) Take  by mouth.  HYDROcodone-acetaminophen (NORCO) 5-325 mg per tablet Take 1 Tab by mouth every six (6) hours as needed. Max Daily Amount: 4 Tabs.  28 Tab 0       Objective:   Vital signs:    BP Readings from Last 3 Encounters:   02/20/19 122/79   02/18/19 127/76   02/17/19 122/80     Wt Readings from Last 3 Encounters:   02/20/19 92.1 kg (203 lb) 02/13/19 93.7 kg (206 lb 8 oz)   12/17/18 95.3 kg (210 lb)     @TMAX(24)@  Wt Readings from Last 3 Encounters:   02/20/19 92.1 kg (203 lb)   02/13/19 93.7 kg (206 lb 8 oz)   12/17/18 95.3 kg (210 lb)     Ht Readings from Last 3 Encounters:   02/20/19 6' (1.829 m)   02/04/19 6' (1.829 m)   12/17/18 5' 11\" (1.803 m)     Respiratory exam: clear to auscultation bilaterally. Cardiac exam: regular rate and rhythm   Sternum: stable. Sternal wound: clean, dry, no drainage, healed. Leg wounds: clean, dry, no drainage, healed. Pedal edema: absent, improved. EKG: normal sinus rhythm with no ischemic changes. CXR: clear lungs bilaterally without a residual pleural effusion. Assessment:  Overall he is doing well following open-heart surgery. Plans / Recommendations:   No heavy lifting, and continue to wear the sternal harness at all times. Regularly scheduled appointment set to see us in three weeks. Increase activities as stamina allows. Call the office with any concerns. Follow-up with Primary Care Provider and Cardiologist in the future as directed. All questions answered. Patient and wife verbalize understanding and agreement with the plan.

## 2019-02-21 LAB
ATRIAL RATE: 81 BPM
CALCULATED P AXIS, ECG09: 47 DEGREES
CALCULATED R AXIS, ECG10: -45 DEGREES
CALCULATED T AXIS, ECG11: 85 DEGREES
DIAGNOSIS, 93000: NORMAL
P-R INTERVAL, ECG05: 146 MS
Q-T INTERVAL, ECG07: 342 MS
QRS DURATION, ECG06: 96 MS
QTC CALCULATION (BEZET), ECG08: 397 MS
VENTRICULAR RATE, ECG03: 81 BPM

## 2019-02-21 PROCEDURE — 3331090002 HH PPS REVENUE DEBIT

## 2019-02-21 PROCEDURE — 3331090001 HH PPS REVENUE CREDIT

## 2019-02-22 PROCEDURE — 3331090001 HH PPS REVENUE CREDIT

## 2019-02-22 PROCEDURE — 3331090002 HH PPS REVENUE DEBIT

## 2019-02-23 PROCEDURE — 3331090001 HH PPS REVENUE CREDIT

## 2019-02-23 PROCEDURE — 3331090002 HH PPS REVENUE DEBIT

## 2019-02-24 PROCEDURE — 3331090002 HH PPS REVENUE DEBIT

## 2019-02-24 PROCEDURE — 3331090001 HH PPS REVENUE CREDIT

## 2019-02-25 PROCEDURE — 3331090002 HH PPS REVENUE DEBIT

## 2019-02-25 PROCEDURE — 3331090001 HH PPS REVENUE CREDIT

## 2019-02-26 PROCEDURE — 3331090002 HH PPS REVENUE DEBIT

## 2019-02-26 PROCEDURE — 3331090001 HH PPS REVENUE CREDIT

## 2019-02-27 PROCEDURE — 3331090001 HH PPS REVENUE CREDIT

## 2019-02-27 PROCEDURE — 3331090002 HH PPS REVENUE DEBIT

## 2019-02-28 PROCEDURE — 3331090001 HH PPS REVENUE CREDIT

## 2019-02-28 PROCEDURE — 3331090002 HH PPS REVENUE DEBIT

## 2019-03-01 ENCOUNTER — PATIENT OUTREACH (OUTPATIENT)
Dept: CARDIOLOGY CLINIC | Age: 74
End: 2019-03-01

## 2019-03-01 PROCEDURE — 3331090002 HH PPS REVENUE DEBIT

## 2019-03-01 PROCEDURE — 3331090001 HH PPS REVENUE CREDIT

## 2019-03-01 NOTE — PROGRESS NOTES
Hospital Discharge Follow-Up Date/Time:  3/1/2019 10:54 AM 
 
Patient was admitted to [facility] on 2/3/19 and discharged on 19 for NSTEMI, and Cabg x 5. The physician discharge summary was available at the time of outreach. Patient was contacted within 1 business days of discharge. Top Challenges reviewed with the provider Strict sternal precautions, constant wearing of sternal harness Cardiac diet and Diabetic Diet Incentive Spirometry use Method of communication with provider :phone Inpatient RRAT score: 5 Was this a readmission? no 
 
 
Nurse Navigator (NN) contacted the patient by telephone to perform post hospital discharge assessment. Verified name and  with patient as identifiers. Provided introduction to self, and explanation of the Nurse Navigator role. Reviewed discharge instructions and red flags with patient who verbalized understanding. Patient given an opportunity to ask questions and does not have any further questions or concerns at this time. The patient agrees to contact the PCP office for questions related to their healthcare. NN provided contact information for future reference. Disease Specific:   CAD Summary of patient's top problems: 1. HTN 2. Diabetes Home Health orders at discharge: PT, OT, SN Home Health company: [facility] Date of initial visit: TBD Durable Medical Equipment ordered/company: NONE Durable Medical Equipment received: N/A Barriers to care? lack of knowledge about disease Advance Care Planning:  
Does patient have an Advance Directive:  not on file Medication(s):  
New Medications at Discharge: Hydrocodone-acetaminophen (NORCO) 5-325 mg, docusate sodium (COLACE) 100 MG Changed Medications at Discharge: None Discontinued Medications at Discharge: olmesartan (BENICAR) 20 mg, meloxicam (MOBIC) 15 mg Medication reconciliation was performed with patient, who verbalizes understanding of administration of home medications. There were no barriers to obtaining medications identified at this time. Referral to Pharm D needed: no  
 
Current Outpatient Medications Medication Sig  
 aspirin delayed-release 81 mg tablet Take 1 Tab by mouth daily.  furosemide (LASIX) 40 mg tablet Take 1 Tab by mouth daily.  potassium chloride (K-DUR, KLOR-CON) 20 mEq tablet Take 1 Tab by mouth daily.  metoprolol tartrate (LOPRESSOR) 25 mg tablet Take 1 Tab by mouth every twelve (12) hours.  HYDROcodone-acetaminophen (NORCO) 5-325 mg per tablet Take 1 Tab by mouth every six (6) hours as needed. Max Daily Amount: 4 Tabs.  multivitamin (ONE A DAY) tablet Take 1 Tab by mouth daily.  PROAIR HFA 90 mcg/actuation inhaler 2 Puffs by Mouth/Throat route every six (6) hours as needed for Wheezing.  fluticasone (FLONASE) 50 mcg/actuation nasal spray 1 [place] by Both Nostrils route two (2) times a day.  pioglitazone (ACTOS) 30 mg tablet Take 30 mg by mouth daily.  glimepiride (AMARYL) 1 mg tablet Take 1 mg by mouth every morning. Indications: TYPE 2 DIABETES MELLITUS  
 atorvastatin (LIPITOR) 40 mg tablet Take 40 mg by mouth daily.  exenatide microspheres (BYDUREON) 2 mg serr 2 mg by SubCUTAneous route every seven (7) days.  omeprazole (PRILOSEC) 20 mg capsule Take 20 mg by mouth daily.  METFORMIN HCL (METFORMIN PO) Take 500 mg by mouth two (2) times a day. No current facility-administered medications for this visit. There are no discontinued medications. [facility] follow up appointment(s):  
Future Appointments Date Time Provider Shannan Gallegos 3/5/2019 To Be Determined IVETH Leavitt 59  
3/12/2019 To Be Determined IVETH Leavitt 59  
3/13/2019  2:00 PM Jenae Strong MD [id number] [facility] Non-[facility] follow up appointment(s): Patient wants to make appt with PCP and Cardiologist once seen by Cardiothoracic Surgeon. [facility]:  out of service area Goals  Knowledge and adherence medication (ie. action, side effects, missed dose, etc.) Patient to provide teach back on medications on/by 3/14/19.  Understands red flags post discharge. Patient able to verbalize signs and symptoms of infection or complications post Cabg surgery on/by 3/14/19.

## 2019-03-02 PROCEDURE — 3331090002 HH PPS REVENUE DEBIT

## 2019-03-02 PROCEDURE — 3331090001 HH PPS REVENUE CREDIT

## 2019-03-03 PROCEDURE — 3331090001 HH PPS REVENUE CREDIT

## 2019-03-03 PROCEDURE — 3331090002 HH PPS REVENUE DEBIT

## 2019-03-04 PROCEDURE — 3331090002 HH PPS REVENUE DEBIT

## 2019-03-04 PROCEDURE — 3331090001 HH PPS REVENUE CREDIT

## 2019-03-05 PROCEDURE — 3331090002 HH PPS REVENUE DEBIT

## 2019-03-05 PROCEDURE — 3331090001 HH PPS REVENUE CREDIT

## 2019-03-06 ENCOUNTER — HOME CARE VISIT (OUTPATIENT)
Dept: HOME HEALTH SERVICES | Facility: HOME HEALTH | Age: 74
End: 2019-03-06
Payer: MEDICARE

## 2019-03-06 ENCOUNTER — HOME CARE VISIT (OUTPATIENT)
Dept: SCHEDULING | Facility: HOME HEALTH | Age: 74
End: 2019-03-06
Payer: MEDICARE

## 2019-03-06 LAB
BACTERIA SPEC CULT: ABNORMAL
GRAM STN SPEC: ABNORMAL
SERVICE CMNT-IMP: ABNORMAL

## 2019-03-06 PROCEDURE — 3331090001 HH PPS REVENUE CREDIT

## 2019-03-06 PROCEDURE — 3331090002 HH PPS REVENUE DEBIT

## 2019-03-06 PROCEDURE — G0300 HHS/HOSPICE OF LPN EA 15 MIN: HCPCS

## 2019-03-06 PROCEDURE — 3331090003 HH PPS REVENUE ADJ

## 2019-03-07 VITALS
OXYGEN SATURATION: 99 % | RESPIRATION RATE: 18 BRPM | DIASTOLIC BLOOD PRESSURE: 76 MMHG | HEART RATE: 60 BPM | SYSTOLIC BLOOD PRESSURE: 133 MMHG

## 2019-03-07 PROCEDURE — 3331090002 HH PPS REVENUE DEBIT

## 2019-03-07 PROCEDURE — 3331090001 HH PPS REVENUE CREDIT

## 2019-03-08 ENCOUNTER — PATIENT OUTREACH (OUTPATIENT)
Dept: CARDIOLOGY CLINIC | Age: 74
End: 2019-03-08

## 2019-03-08 PROCEDURE — 3331090002 HH PPS REVENUE DEBIT

## 2019-03-08 PROCEDURE — 3331090001 HH PPS REVENUE CREDIT

## 2019-03-08 NOTE — PROGRESS NOTES
Hospital Discharge Follow-Up      Date/Time:  3/8/2019 10:54 AM    Patient was admitted to [facility] on 2/3/19 and discharged on 19 for NSTEMI, and Cabg x 5. The physician discharge summary was available at the time of outreach. Patient was contacted within 1 business days of discharge. Top Challenges reviewed with the provider   Strict sternal precautions, constant wearing of sternal harness  Cardiac diet and Diabetic Diet  Incentive Spirometry use         Method of communication with provider :phone    Inpatient RRAT score: 5  Was this a readmission? no      Nurse Navigator (NN) contacted the patient by telephone to perform post hospital discharge assessment. Verified name and  with patient as identifiers. Provided introduction to self, and explanation of the Nurse Navigator role. Reviewed discharge instructions and red flags with patient who verbalized understanding. Patient given an opportunity to ask questions and does not have any further questions or concerns at this time. The patient agrees to contact the PCP office for questions related to their healthcare. NN provided contact information for future reference. Disease Specific:   CAD    Summary of patient's top problems:  1. HTN  2.  Diabetes      Home Health orders at discharge: PT, OT, [address]: [facility]  Date of initial visit: TBD    Durable Medical Equipment ordered/company: NONE  Durable Medical Equipment received: N/A    Barriers to care? lack of knowledge about disease    Advance Care Planning:   Does patient have an Advance Directive:  not on file     Medication(s):   New Medications at Discharge: Hydrocodone-acetaminophen ([address]) 5-325 mg, docusate sodium (COLACE) 100 MG  Changed Medications at Discharge: None  Discontinued Medications at Discharge: olmesartan (BENICAR) 20 mg, meloxicam (MOBIC) 15 mg    Medication reconciliation was performed with patient, who verbalizes understanding of administration of home medications. There were no barriers to obtaining medications identified at this time. Referral to Pharm D needed: no     Current Outpatient Medications   Medication Sig    aspirin delayed-release 81 mg tablet Take 1 Tab by mouth daily.  furosemide (LASIX) 40 mg tablet Take 1 Tab by mouth daily.  potassium chloride (K-DUR, KLOR-CON) 20 mEq tablet Take 1 Tab by mouth daily.  metoprolol tartrate (LOPRESSOR) 25 mg tablet Take 1 Tab by mouth every twelve (12) hours.  HYDROcodone-acetaminophen (NORCO) 5-325 mg per tablet Take 1 Tab by mouth every six (6) hours as needed. Max Daily Amount: 4 Tabs.  multivitamin (ONE A DAY) tablet Take 1 Tab by mouth daily.  PROAIR HFA 90 mcg/actuation inhaler 2 Puffs by Mouth/Throat route every six (6) hours as needed for Wheezing.  fluticasone (FLONASE) 50 mcg/actuation nasal spray 1 [place] by Both Nostrils route two (2) times a day.  pioglitazone (ACTOS) 30 mg tablet Take 30 mg by mouth daily.  glimepiride (AMARYL) 1 mg tablet Take 1 mg by mouth every morning. Indications: TYPE 2 DIABETES MELLITUS    atorvastatin (LIPITOR) 40 mg tablet Take 40 mg by mouth daily.  exenatide microspheres (BYDUREON) 2 mg serr 2 mg by SubCUTAneous route every seven (7) days.  omeprazole (PRILOSEC) 20 mg capsule Take 20 mg by mouth daily.  METFORMIN HCL (METFORMIN PO) Take 500 mg by mouth two (2) times a day. No current facility-administered medications for this visit. There are no discontinued medications. [facility] follow up appointment(s):   Future Appointments   Date Time Provider Shannan Gallegos   3/12/2019 To Be Determined Shane Apple RN [address] [facility]   3/13/2019  2:00 PM Anil White MD [address] [facility]      Non-[facility] follow up appointment(s): Patient wants to make appt with PCP and Cardiologist once seen by Cardiothoracic Surgeon.   [facility]:  out of service area       Goals      Knowledge and adherence medication (ie. action, side effects, missed dose, etc.)      Patient to provide teach back on medications on/by 3/14/19.  Understands red flags post discharge. Patient able to verbalize signs and symptoms of infection or complications post Cabg surgery on/by 3/14/19.

## 2019-03-09 PROCEDURE — 3331090002 HH PPS REVENUE DEBIT

## 2019-03-09 PROCEDURE — 3331090001 HH PPS REVENUE CREDIT

## 2019-03-10 PROCEDURE — 3331090001 HH PPS REVENUE CREDIT

## 2019-03-10 PROCEDURE — 3331090002 HH PPS REVENUE DEBIT

## 2019-03-11 PROCEDURE — 3331090002 HH PPS REVENUE DEBIT

## 2019-03-11 PROCEDURE — 3331090001 HH PPS REVENUE CREDIT

## 2019-03-12 ENCOUNTER — HOME CARE VISIT (OUTPATIENT)
Dept: SCHEDULING | Facility: HOME HEALTH | Age: 74
End: 2019-03-12

## 2019-03-12 PROCEDURE — 3331090002 HH PPS REVENUE DEBIT

## 2019-03-12 PROCEDURE — 3331090001 HH PPS REVENUE CREDIT

## 2019-03-13 ENCOUNTER — OFFICE VISIT (OUTPATIENT)
Dept: CARDIOTHORACIC SURGERY | Age: 74
End: 2019-03-13

## 2019-03-13 VITALS
SYSTOLIC BLOOD PRESSURE: 148 MMHG | DIASTOLIC BLOOD PRESSURE: 82 MMHG | RESPIRATION RATE: 18 BRPM | TEMPERATURE: 98.7 F | WEIGHT: 207 LBS | HEART RATE: 82 BPM | HEIGHT: 72 IN | OXYGEN SATURATION: 98 % | BODY MASS INDEX: 28.04 KG/M2

## 2019-03-13 DIAGNOSIS — Z95.1 S/P CABG X 5: Primary | ICD-10-CM

## 2019-03-13 PROCEDURE — 3331090002 HH PPS REVENUE DEBIT

## 2019-03-13 PROCEDURE — 3331090001 HH PPS REVENUE CREDIT

## 2019-03-13 RX ORDER — METOPROLOL SUCCINATE 100 MG/1
100 TABLET, EXTENDED RELEASE ORAL DAILY
Qty: 90 TAB | Refills: 3 | Status: SHIPPED | OUTPATIENT
Start: 2019-03-13 | End: 2020-02-05 | Stop reason: SDUPTHER

## 2019-03-13 RX ORDER — METOPROLOL TARTRATE 25 MG/1
50 TABLET, FILM COATED ORAL EVERY 12 HOURS
Qty: 60 TAB | Refills: 2 | Status: SHIPPED | OUTPATIENT
Start: 2019-03-13 | End: 2019-03-13 | Stop reason: ALTCHOICE

## 2019-03-13 NOTE — PATIENT INSTRUCTIONS
You may stop wearing the white sternal harness. Continue all of your medications as scheduled. You are cleared to start Cardiac Rehab, which we recommend for our patients who have heart vessel bypass surgery. We will make a referral and they will be calling you to discuss the program.    You may begin regular exercise, working up over time to 30 minutes of vigorous each day. Please adhere to low-cholesterol, low-fat diet. If you need more information of what and what not to eat, please call our office and we will provide this for you. You are cleared to drive as of today. You may resume all physical activities as your stamina allows, but be careful for the next few weeks in lifting any heavy objects greater than 5 - 10 pounds. Your chest muscles may be weaker than before surgery and will take some time to build back up to their normal strength. Until then, lift slowly and with caution. Please follow-up with your Cardiologist and Primary Care Physician as scheduled. Although you have no regularly scheduled appointments to see us in the future, please feel free to call our office with any questions or concerns. May take meloxicam.    Stop taking metoprolol 25 mg twice daily instead start taking the new prescription of Toprol  mg daily.

## 2019-03-14 PROCEDURE — 3331090002 HH PPS REVENUE DEBIT

## 2019-03-14 PROCEDURE — 3331090001 HH PPS REVENUE CREDIT

## 2019-03-15 ENCOUNTER — PATIENT OUTREACH (OUTPATIENT)
Dept: CARDIOLOGY CLINIC | Age: 74
End: 2019-03-15

## 2019-03-15 ENCOUNTER — TELEPHONE (OUTPATIENT)
Dept: CARDIAC REHAB | Age: 74
End: 2019-03-15

## 2019-03-15 PROCEDURE — 3331090002 HH PPS REVENUE DEBIT

## 2019-03-15 PROCEDURE — 3331090001 HH PPS REVENUE CREDIT

## 2019-03-15 NOTE — TELEPHONE ENCOUNTER
Cardiac Rehab called patient and spoke to him about the program. He was interested and understood his benefit information. He is scheduled for an evaluation on 3/21/19 at 9:00.     Thank you,  Mor Aguilera

## 2019-03-16 PROCEDURE — 3331090002 HH PPS REVENUE DEBIT

## 2019-03-16 PROCEDURE — 3331090001 HH PPS REVENUE CREDIT

## 2019-03-17 PROCEDURE — 3331090001 HH PPS REVENUE CREDIT

## 2019-03-17 PROCEDURE — 3331090002 HH PPS REVENUE DEBIT

## 2019-03-18 PROCEDURE — 3331090001 HH PPS REVENUE CREDIT

## 2019-03-18 PROCEDURE — 3331090002 HH PPS REVENUE DEBIT

## 2019-03-19 ENCOUNTER — HOSPITAL ENCOUNTER (OUTPATIENT)
Dept: CARDIAC REHAB | Age: 74
Discharge: HOME | End: 2019-03-19
Payer: MEDICARE

## 2019-03-19 VITALS — BODY MASS INDEX: 28.48 KG/M2 | WEIGHT: 210 LBS

## 2019-03-19 PROCEDURE — 3331090001 HH PPS REVENUE CREDIT

## 2019-03-19 PROCEDURE — 3331090002 HH PPS REVENUE DEBIT

## 2019-03-19 PROCEDURE — 93798 PHYS/QHP OP CAR RHAB W/ECG: CPT

## 2019-03-19 NOTE — CARDIO/PULMONARY
Camille Clark 72 y.o. presented to cardiac wellness for an intake and a six minute walk test today with a primary diagnosis of S/P CABG x 5. Patient's EF is 60%. Camille Clark has no other cardiac history. Cardiac risk factors include smoking/ tobacco exposure, family history, dyslipidemia, diabetes mellitus, hypertension and these were reviewed with patient and in chart. Camille Clark is  and lives with his wife. PHQ-9, depression score, is 5 and this is considered high. The result was discussed with patient who denies score to be accurate and a copy of the PHQ-9 was sent to his PCP. Patient denied chest pain or SOB during 6 minute walk and was in SR without ectopy. Camille Clark will attend an exercise session 3 days a week in cardiac wellness. No exceptions noted during intake.

## 2019-03-19 NOTE — CARDIO/PULMONARY
Patient name: Jass Berumen. : 1945 Goals Comments 1. Lift weights at the gym [x] initial 
[] met                 
[] not met 
[] progressing 2. Complete Cardiac Rehab Program 
 [x] initial 
[] met                 
[] not met 
[] progressing 3. Weigh 200 lbs [x] initial 
[] met                 
[] not met 
[] progressing 4. [] initial 
[] met                 
[] not met 
[] progressing Frequency / Duration: Patient to be seen 3 times per week for 12 
 weeks: 
 
 
Alejandrina Barrett RN 3/19/2019 1:08 PM

## 2019-03-19 NOTE — CARDIO/PULMONARY
Cardiac ITP  
 
 CR INTAKE from 3/19/2019 in [facility] Treatment Diagnosis 1 CABG  
CABG Date 02/08/19 Referral Date 03/13/19 Co-morbidities Diabetes  [Type 2 Diabetic] ITP Visit Type Initial Assessment ITP Next Review Date 04/09/19 Visit #/Total Visits 1/36 EF % 60 % Risk Stratification Low  
ITP Exercise, Psychosocial, Tobacco, Nutrition, Education Stages of Change Preparation DASI Total Score 36.7 Assisted Devices None Total Score 0 Safety Level I No data Safety Level II No data Test Six minute walk test  
Mode Treadmill, Bike, Stepper, Ergometer, Elliptical  
Duration per session 35 Intensity  METS       3.3 RPE 11-13 Progression 4. mets in 40 minutes Target Heart Rate  Resistance Training No  
Resting /80 Peak /80 Is BP WDL? No  [will monitor closely and report to physician as needed] Type Walking Frequency Daily Duration 5 miles Resistance Training No  
Education Self pulse, Exercise safety, Signs/Symptoms to report, RPE scale, Equipment orientation, Warm up/Cool down, Physically active Target Goal(s) Individual exercise RX, Aerobic activity 30 + minutes/day  5 days/week, BP < 140/90 or < 130/80, if DM or CKD Patient Stated Exercise Goals Patient wants to get back to weight lifting Stages of Change Preparation [facility] Total Score 16 PHQ 9 Score 5 Interventions PCP notified Currently Taking Psychotropic Meds No  
Education Advanced directives, Benefits of CPR completion, Cardiac meds, Coping techniques, Impact self care behaviors on health, Environmental triggers, Relaxation techniques, Sexual activity, Signs/Symptoms of depression, Stress management class, Support groups, Tobacco dangers Target Goal(s) Assess presence or absence of depression using a valid screening tool, Demonstrates appropriate interaction with others, Engages in self-care behaviors, Maximizes coping skills, Positive support group Uses Stress Mgmt Techniques Yes  [Reading] Patient Stated Psychosocial Goals Patient does not feel stressed, anxious, or depressed Stages of Change Pre-Contemplation Diabetes Yes  FBS Date 03/18/19 HbA1C 7.0 HbA1C Date 02/03/19 BG at Home Yes BG Frequency 1 time per day Diabetes Med(s) Metformin, Bydureon, Actos, Amaryl Diabetes Med(s) Change No  
Date Lipids Drawn 02/03/19 Total 100 HDL 50 LDL 33.8 Triglycerides 81 Lipid Med(s) Atorvastatin Lipid Med Change(s) No  
Weight  95.3 kg (210 lb) Height  6' (1.829 m) BMI 28.54 Weight Goal Patient wants to get down to 200 lbs Waist Circumference  37\" Alcohol Special  
Type Beer Amount 1 beer Nutrition Assessment Tool RYP Rate Your Plate Total Score 40 Dietitian Consult Yes Nurse/Patient Discussion Yes Nutrition Goals Patient has none, likes his diet Nutrition Class Yes Diabetes Education Referral No  
Lipid Clinic Referral No  
Weight Management Referral No  
Education Low fat & cholesterol diet, Low sodium diet, Carb-controlled diet, High fiber diet, Healthy eating, DM & CAD relationship, Signs/Symptoms Hypo/Hyperglycemia Patient Stated Nutrition Goals Patient does not want to change his diet, feels it is fine Learning Barrier Ready to learn Knowledge Test Score 9 Education Schedule Given Yes Education CAD, Risk factors, Med Compliance, Cardiac A&P, Signs/Symptoms of Angina, Sexuality Hypertension Yes Hypertension Controlled Yes Med(s) Change No  
BP Meds Metoprolol and Benicar Target Goals Medication compliance, Risk factors, Understand target guidelines for lipids, Understand target guidelines for B/P Patient Stated Education Goals Patient is willing to listen

## 2019-03-20 PROCEDURE — 3331090001 HH PPS REVENUE CREDIT

## 2019-03-20 PROCEDURE — 3331090002 HH PPS REVENUE DEBIT

## 2019-03-20 NOTE — PROGRESS NOTES
Cardiac ITP  
 
 CR INTAKE from 3/19/2019 in [facility] Treatment Diagnosis 1 CABG  
CABG Date 02/08/19 Referral Date 03/13/19 Co-morbidities Diabetes  [Type 2 Diabetic] ITP Visit Type Initial Assessment ITP Next Review Date 04/09/19 Visit #/Total Visits 1/36 EF % 60 % Risk Stratification Low  
ITP Exercise, Psychosocial, Tobacco, Nutrition, Education Stages of Change Preparation DASI Total Score 36.7 Assisted Devices None Total Score 0 Safety Level I No data Safety Level II No data Test Six minute walk test  
Mode Treadmill, Bike, Stepper, Ergometer, Elliptical  
Duration per session 35 Intensity  METS       3.3 RPE 11-13 Progression 4. mets in 40 minutes Target Heart Rate  Resistance Training No  
Resting /80 Peak /80 Is BP WDL? No  [will monitor closely and report to physician as needed] Type Walking Frequency Daily Duration 5 miles Resistance Training No  
Education Self pulse, Exercise safety, Signs/Symptoms to report, RPE scale, Equipment orientation, Warm up/Cool down, Physically active Target Goal(s) Individual exercise RX, Aerobic activity 30 + minutes/day  5 days/week, BP < 140/90 or < 130/80, if DM or CKD Patient Stated Exercise Goals Patient wants to get back to weight lifting Stages of Change Preparation [facility] Total Score 16 PHQ 9 Score 5 Interventions PCP notified Currently Taking Psychotropic Meds No  
Education Advanced directives, Benefits of CPR completion, Cardiac meds, Coping techniques, Impact self care behaviors on health, Environmental triggers, Relaxation techniques, Sexual activity, Signs/Symptoms of depression, Stress management class, Support groups, Tobacco dangers Target Goal(s) Assess presence or absence of depression using a valid screening tool, Demonstrates appropriate interaction with others, Engages in self-care behaviors, Maximizes coping skills, Positive support group Uses Stress Mgmt Techniques Yes  [Reading] Patient Stated Psychosocial Goals Patient does not feel stressed, anxious, or depressed Stages of Change Pre-Contemplation Diabetes Yes  FBS Date 03/18/19 HbA1C 7.0 HbA1C Date 02/03/19 BG at Home Yes BG Frequency 1 time per day Diabetes Med(s) Metformin, Bydureon, Actos, Amaryl Diabetes Med(s) Change No  
Date Lipids Drawn 02/03/19 Total 100 HDL 50 LDL 33.8 Triglycerides 81 Lipid Med(s) Atorvastatin Lipid Med Change(s) No  
Weight  95.3 kg (210 lb) Height  6' (1.829 m) BMI 28.54 Weight Goal Patient wants to get down to 200 lbs Waist Circumference  37\" Alcohol Special  
Type Beer Amount 1 beer Nutrition Assessment Tool RYP Rate Your Plate Total Score 40 Dietitian Consult Yes Nurse/Patient Discussion Yes Nutrition Goals Patient has none, likes his diet Nutrition Class Yes Diabetes Education Referral No  
Lipid Clinic Referral No  
Weight Management Referral No  
Education Low fat & cholesterol diet, Low sodium diet, Carb-controlled diet, High fiber diet, Healthy eating, DM & CAD relationship, Signs/Symptoms Hypo/Hyperglycemia Patient Stated Nutrition Goals Patient does not want to change his diet, feels it is fine Learning Barrier Ready to learn Knowledge Test Score 9 Education Schedule Given Yes Education CAD, Risk factors, Med Compliance, Cardiac A&P, Signs/Symptoms of Angina, Sexuality Hypertension Yes Hypertension Controlled Yes Med(s) Change No  
BP Meds Metoprolol and Benicar Target Goals Medication compliance, Risk factors, Understand target guidelines for lipids, Understand target guidelines for B/P Patient Stated Education Goals Patient is willing to listen

## 2019-03-21 PROCEDURE — 3331090002 HH PPS REVENUE DEBIT

## 2019-03-21 PROCEDURE — 3331090001 HH PPS REVENUE CREDIT

## 2019-03-22 PROCEDURE — 3331090001 HH PPS REVENUE CREDIT

## 2019-03-22 PROCEDURE — 3331090002 HH PPS REVENUE DEBIT

## 2019-03-23 PROCEDURE — 3331090001 HH PPS REVENUE CREDIT

## 2019-03-23 PROCEDURE — 3331090002 HH PPS REVENUE DEBIT

## 2019-03-24 PROCEDURE — 3331090002 HH PPS REVENUE DEBIT

## 2019-03-24 PROCEDURE — 3331090001 HH PPS REVENUE CREDIT

## 2019-03-25 ENCOUNTER — HOSPITAL ENCOUNTER (OUTPATIENT)
Dept: CARDIAC REHAB | Age: 74
Discharge: HOME | End: 2019-03-25
Payer: MEDICARE

## 2019-03-25 VITALS — WEIGHT: 210 LBS | BODY MASS INDEX: 28.48 KG/M2

## 2019-03-25 PROCEDURE — 93798 PHYS/QHP OP CAR RHAB W/ECG: CPT

## 2019-03-25 PROCEDURE — 3331090002 HH PPS REVENUE DEBIT

## 2019-03-25 PROCEDURE — 3331090001 HH PPS REVENUE CREDIT

## 2019-03-26 PROCEDURE — 3331090001 HH PPS REVENUE CREDIT

## 2019-03-26 PROCEDURE — 3331090002 HH PPS REVENUE DEBIT

## 2019-03-27 ENCOUNTER — HOSPITAL ENCOUNTER (OUTPATIENT)
Dept: CARDIAC REHAB | Age: 74
Discharge: HOME | End: 2019-03-27
Payer: MEDICARE

## 2019-03-27 VITALS — BODY MASS INDEX: 28.48 KG/M2 | WEIGHT: 210 LBS

## 2019-03-27 PROCEDURE — 3331090002 HH PPS REVENUE DEBIT

## 2019-03-27 PROCEDURE — 93797 PHYS/QHP OP CAR RHAB WO ECG: CPT

## 2019-03-27 PROCEDURE — 3331090001 HH PPS REVENUE CREDIT

## 2019-03-27 PROCEDURE — 93798 PHYS/QHP OP CAR RHAB W/ECG: CPT

## 2019-03-28 PROCEDURE — 3331090001 HH PPS REVENUE CREDIT

## 2019-03-28 PROCEDURE — 3331090002 HH PPS REVENUE DEBIT

## 2019-03-29 ENCOUNTER — HOSPITAL ENCOUNTER (OUTPATIENT)
Dept: CARDIAC REHAB | Age: 74
Discharge: HOME | End: 2019-03-29
Payer: MEDICARE

## 2019-03-29 VITALS — BODY MASS INDEX: 29.02 KG/M2 | WEIGHT: 214 LBS

## 2019-03-29 PROCEDURE — 93798 PHYS/QHP OP CAR RHAB W/ECG: CPT

## 2019-03-29 PROCEDURE — 3331090002 HH PPS REVENUE DEBIT

## 2019-03-29 PROCEDURE — 3331090001 HH PPS REVENUE CREDIT

## 2019-03-30 PROCEDURE — 3331090002 HH PPS REVENUE DEBIT

## 2019-03-30 PROCEDURE — 3331090001 HH PPS REVENUE CREDIT

## 2019-03-31 PROCEDURE — 3331090002 HH PPS REVENUE DEBIT

## 2019-03-31 PROCEDURE — 3331090001 HH PPS REVENUE CREDIT

## 2019-04-01 ENCOUNTER — APPOINTMENT (OUTPATIENT)
Dept: CARDIAC REHAB | Age: 74
End: 2019-04-01
Payer: MEDICARE

## 2019-04-01 PROCEDURE — 3331090001 HH PPS REVENUE CREDIT

## 2019-04-01 PROCEDURE — 3331090002 HH PPS REVENUE DEBIT

## 2019-04-02 PROCEDURE — 3331090001 HH PPS REVENUE CREDIT

## 2019-04-02 PROCEDURE — 3331090002 HH PPS REVENUE DEBIT

## 2019-04-02 NOTE — PROGRESS NOTES
CARDIAC SURGERY FOLLOW-UP NOTE    4/2/2019  1:46 PM    Subjective:   Nadine Salgado. returns for a follow-up visit following CABG. He feels well. Angina is absent. Stamina has improved. Eating well. Current medications include:  Current Outpatient Medications   Medication Sig Dispense Refill    metoprolol succinate (TOPROL-XL) 100 mg tablet Take 1 Tab by mouth daily. 90 Tab 3    aspirin delayed-release 81 mg tablet Take 1 Tab by mouth daily. 30 Tab 2    multivitamin (ONE A DAY) tablet Take 1 Tab by mouth daily.  PROAIR HFA 90 mcg/actuation inhaler 2 Puffs by Mouth/Throat route every six (6) hours as needed for Wheezing.  fluticasone (FLONASE) 50 mcg/actuation nasal spray 1 [place] by Both Nostrils route two (2) times a day.  pioglitazone (ACTOS) 30 mg tablet Take 30 mg by mouth daily.  glimepiride (AMARYL) 1 mg tablet Take 1 mg by mouth every morning. Indications: TYPE 2 DIABETES MELLITUS      atorvastatin (LIPITOR) 40 mg tablet Take 40 mg by mouth daily.  exenatide microspheres (BYDUREON) 2 mg serr 2 mg by SubCUTAneous route every seven (7) days.  omeprazole (PRILOSEC) 20 mg capsule Take 20 mg by mouth daily.  METFORMIN HCL (METFORMIN PO) Take 500 mg by mouth two (2) times a day. Objective:   Vital signs:    BP Readings from Last 3 Encounters:   03/13/19 148/82   03/06/19 133/76   02/20/19 122/79     Wt Readings from Last 3 Encounters:   03/29/19 97.1 kg (214 lb)   03/27/19 95.3 kg (210 lb)   03/25/19 95.3 kg (210 lb)     @TMAX(24)@  Wt Readings from Last 3 Encounters:   03/29/19 97.1 kg (214 lb)   03/27/19 95.3 kg (210 lb)   03/25/19 95.3 kg (210 lb)     Ht Readings from Last 3 Encounters:   03/13/19 6' (1.829 m)   02/20/19 6' (1.829 m)   02/04/19 6' (1.829 m)     Respiratory exam: clear to auscultation bilaterally. Cardiac exam: regular rate and rhythm   Sternum: stable. Sternal wound: clean, dry, no drainage, healed.     Assessment:  Overall he is doing well following open-heart surgery. Plans / Recommendations:   Stop wearing the sternal harness. No regularly scheduled appointments to see us in the future, but encouraged to call with any questions or concerns. Gradually return to all normal activities. Recommend entrance into cardiac rehabilitation program.  Referral made. Adhere to low-cholesterol, low-fat diet. Begin regular exercise, ideally 30 minutes daily. Follow-up with Primary Care Provider and Cardiologist in the future as directed.

## 2019-04-03 ENCOUNTER — HOSPITAL ENCOUNTER (OUTPATIENT)
Dept: CARDIAC REHAB | Age: 74
Discharge: HOME | End: 2019-04-03
Payer: MEDICARE

## 2019-04-03 VITALS — WEIGHT: 209 LBS | BODY MASS INDEX: 28.35 KG/M2

## 2019-04-03 PROCEDURE — 3331090001 HH PPS REVENUE CREDIT

## 2019-04-03 PROCEDURE — 93798 PHYS/QHP OP CAR RHAB W/ECG: CPT

## 2019-04-03 PROCEDURE — 3331090002 HH PPS REVENUE DEBIT

## 2019-04-03 PROCEDURE — 93797 PHYS/QHP OP CAR RHAB WO ECG: CPT

## 2019-04-04 ENCOUNTER — OFFICE VISIT (OUTPATIENT)
Dept: CARDIOLOGY CLINIC | Age: 74
End: 2019-04-04

## 2019-04-04 VITALS
HEIGHT: 72 IN | OXYGEN SATURATION: 97 % | WEIGHT: 212 LBS | BODY MASS INDEX: 28.71 KG/M2 | SYSTOLIC BLOOD PRESSURE: 158 MMHG | DIASTOLIC BLOOD PRESSURE: 86 MMHG | HEART RATE: 70 BPM

## 2019-04-04 DIAGNOSIS — Z95.1 S/P CABG X 5: Primary | ICD-10-CM

## 2019-04-04 RX ORDER — ATORVASTATIN CALCIUM 40 MG/1
40 TABLET, FILM COATED ORAL
Qty: 30 TAB | Refills: 6 | Status: SHIPPED | OUTPATIENT
Start: 2019-04-04 | End: 2019-11-13 | Stop reason: SDUPTHER

## 2019-04-04 RX ORDER — OLMESARTAN MEDOXOMIL 40 MG/1
TABLET ORAL DAILY
COMMUNITY

## 2019-04-04 RX ORDER — HYDROCHLOROTHIAZIDE 25 MG/1
25 TABLET ORAL DAILY
Qty: 30 TAB | Refills: 6 | Status: SHIPPED | OUTPATIENT
Start: 2019-04-04

## 2019-04-04 RX ORDER — MELOXICAM 15 MG/1
15 TABLET ORAL DAILY
COMMUNITY

## 2019-04-04 RX ORDER — ATORVASTATIN CALCIUM 20 MG/1
TABLET, FILM COATED ORAL DAILY
COMMUNITY
End: 2019-04-04

## 2019-04-04 NOTE — PROGRESS NOTES
[address] Arias Chief Complaint   Patient presents with   [facility] Follow Up     CABG x 5 2/8/19 - no cardiac complaints       HPI    [address] Arias. is a 72 y.o. gentleman here to establish his long term CV care. As you know patient was found to have an NSTEM at [facility] ER complaining of persistent chest pain despite a negative nuclear stress test in December 2018. Due to his high pretest probability for ACS we went straight to cath 2/4/ 2019 which revealed MV CAD (see report below) and had subsequently underwent CABG. His EF remained normal 60%. He progressed remarkably well and is currently in cardiac rehab. I do note in others documentation that his SBP has been creeping up since hospital discharge. His BB was increased significantly and more recently ARB doubled, and still he can see SBPs 150s-170s. He thinks this is not always right as there has been a 20 point difference btwn manual and automated readings. Regardless he feels great and has no exertional symptoms. Still feels some mild soreness at the incision site. · Culprit is mid RPDA 95% stenosis with MAME II flow. · Mid to distal left main 65-70% stenosis. · Mid LAD calcified 85% stenosis. · Overall normal left ventricular systolic function. Past Medical History:   Diagnosis Date    Arthritis     Diabetes ([address]     Hypertension        Past Surgical History:   Procedure Laterality Date    HX HEENT      tumor removed from neck    HX ORTHOPAEDIC      elbow     LAP,CHOLECYSTECTOMY  11/12/15    Dr. Fariha Banda      back surgery x2       Current Outpatient Medications   Medication Sig Dispense Refill    meloxicam (MOBIC) 15 mg tablet Take 15 mg by mouth daily.  olmesartan (BENICAR) 40 mg tablet Take  by mouth daily.  atorvastatin (LIPITOR) 20 mg tablet Take  by mouth daily.  metoprolol succinate (TOPROL-XL) 100 mg tablet Take 1 Tab by mouth daily.  90 Tab 3    aspirin delayed-release 81 mg tablet Take 1 Tab by mouth daily. 30 Tab 2    multivitamin (ONE A DAY) tablet Take 1 Tab by mouth daily.  PROAIR HFA 90 mcg/actuation inhaler 2 Puffs by Mouth/Throat route every six (6) hours as needed for Wheezing.  pioglitazone (ACTOS) 30 mg tablet Take 30 mg by mouth daily.  glimepiride (AMARYL) 1 mg tablet Take 2 mg by mouth every morning. Indications: type 2 diabetes mellitus      exenatide microspheres (BYDUREON) 2 mg serr 2 mg by SubCUTAneous route every seven (7) days.  omeprazole (PRILOSEC) 20 mg capsule Take 20 mg by mouth daily.  METFORMIN HCL (METFORMIN PO) Take 500 mg by mouth two (2) times a day.  fluticasone (FLONASE) 50 mcg/actuation nasal spray 1 [place] by Both Nostrils route two (2) times a day.          Allergies   Allergen Reactions    Augmentin [Amoxicillin-Pot Clavulanate] Other (comments)     Turns his tongue black       Social History     Socioeconomic History    Marital status:      Spouse name: Rodolfo Leavitt Number of children: 2    Years of education: 15    Highest education level: Associate degree: occupational, technical, or vocational program   Occupational History    Not on file   Social Needs    Financial resource strain: Not hard at all   Netzoptiker insecurity:     Worry: Never true     Inability: Never true   [employer] [facility] needs:     Medical: No     Non-medical: No   Tobacco Use    Smoking status: Former Smoker     Packs/day: 3.00     Years: 33.00     Pack years: 99.00     Last attempt to quit: 1990     Years since quittin.2    Smokeless tobacco: Former User   Substance and Sexual Activity    Alcohol use: Yes     Comment: rarely    Drug use: No    Sexual activity: Yes     Partners: Female   Lifestyle    Physical activity:     Days per week: 3 days     Minutes per session: 120 min    Stress: Not at all   Relationships    Social connections:     Talks on phone: More than three times a week     Gets together: More than three times a week     Attends Worship service: More than 4 times per year     Active member of club or organization: No     Attends meetings of clubs or organizations: Never     Relationship status:     Intimate partner violence:     Fear of current or ex partner: No     Emotionally abused: No     Physically abused: No     Forced sexual activity: No   Other Topics Concern     Service Yes    Blood Transfusions No    Caffeine Concern Yes    Occupational Exposure Yes    Hobby Hazards No    Sleep Concern No    Stress Concern No    Weight Concern Yes    Special Diet No    Back Care No    Exercise Yes    Bike Helmet No    Seat Belt Yes    Self-Exams Yes   Social History Narrative    Not on file        Review of Systems    14 pt Review of Systems is negative unless otherwise mentioned in the HPI. Wt Readings from Last 3 Encounters:   04/04/19 96.2 kg (212 lb)   04/03/19 94.8 kg (209 lb)   03/29/19 97.1 kg (214 lb)     Temp Readings from Last 3 Encounters:   03/13/19 98.7 °F (37.1 °C) (Oral)   02/20/19 97.9 °F (36.6 °C) (Oral)   02/18/19 97.6 °F (36.4 °C)     BP Readings from Last 3 Encounters:   04/04/19 158/86   03/13/19 148/82   03/06/19 133/76     Pulse Readings from Last 3 Encounters:   04/04/19 70   03/13/19 82   03/06/19 60       02/03/19   ECHO ADULT COMPLETE 02/03/2019 2/3/2019    Narrative · Calculated left ventricular ejection fraction is 60%. Normal left   ventricular wall motion, no regional wall motion abnormality noted. · No significant valvular pathology or effusion, no prior study for   comparison. Signed by: Felton Jimenez DO       Physical Exam:    Visit Vitals  /86   Pulse 70   Ht 6' (1.829 m)   Wt 96.2 kg (212 lb)   SpO2 97%   BMI 28.75 kg/m²      Physical Exam   Constitutional: He is oriented to person, place, and time. He appears well-developed and well-nourished. HENT:   Head: Normocephalic and atraumatic.    Eyes: Pupils are equal, round, and reactive to light. EOM are normal. No scleral icterus. Cardiovascular: Normal rate, regular rhythm, normal heart sounds and intact distal pulses. Exam reveals no gallop and no friction rub. No murmur heard. Well healing c/d/i sternotomy noted   Pulmonary/Chest: Effort normal and breath sounds normal. No respiratory distress. He has no wheezes. He has no rales. He exhibits no tenderness. Abdominal: Soft. Bowel sounds are normal.   Musculoskeletal: He exhibits no edema. Neurological: He is alert and oriented to person, place, and time. Skin: Skin is warm and dry. No rash noted. Psychiatric: He has a normal mood and affect. EKG last: NSR, normal axis and intervals, no ST segment abnormalities    Impression and Plan:  Venkat Mendiola is a 72 y.o. with:    1.) S/p NSTEMI with CABG 2/8/19  2.) Normal LV function  3.) HTN, more recently liable despite increased BB & ARB, known/ prefer goal avg SBP <140  4.) DM2, known  5.) +FH  6.) Former tobacco    1.) Start HCTZ 25 mg daily  2.) Increase to high intensity statin, 40 mg qhs  3.) Otherwise continue medical therapy  4.) RTC 4-6 months routine CAD follow up, encouraged progress through cardiac rehab    Overall patient is doing remarkably well after surgery. Did take the liberty of making some small adjustments to his medical therapy mostly in regards to his blood pressure management. No his systolic blood pressures have not been ideal and this may be multifactorial in the setting of his chronic arthritis (on Mobic) etc. I find that in addition of some type of diuretic is often times what is needed to get our systolic blood pressure at goal.  Hoping this small change will be all he needs to have her control. He would also benefit from high intensity statin to reduce his future ASCVD events so we made this small adjustment as well.   I offered to have him come back for close recheck with her blood pressure clinic but he says he also follows very closely with his primary care physician. I am very happy to work together to get Mr. Jose Boland feeling better and reduce his risk of future events. All of his questions to his satisfaction. Thank you for allowing me to participate in the care of your patient, please do not hesitate to call with questions or concerns.     Kindest Regards,    Andrei Booker, DO

## 2019-04-04 NOTE — PROGRESS NOTES
Mary Jj. presents today for   Chief Complaint   Patient presents with   [facility] Follow Up     CABG x 5 2/8/19       Mary Jj. preferred language for health care discussion is english/other. Is someone accompanying this pt? Yes, spouse     Is the patient using any DME equipment during [facility]? No     Depression Screening:  3 most recent PHQ Screens 3/19/2019   Little interest or pleasure in doing things Not at all   Feeling down, depressed, irritable, or hopeless Not at all   Total Score PHQ 2 0   Trouble falling or staying asleep, or sleeping too much Several days   Feeling tired or having little energy More than half the days   Poor appetite, weight loss, or overeating Several days   Feeling bad about yourself - or that you are a failure or have let yourself or your family down Not at all   Trouble concentrating on things such as school, work, reading, or watching TV Several days   Moving or speaking so slowly that other people could have noticed; or the opposite being so fidgety that others notice Not at all   Thoughts of being better off dead, or hurting yourself in some way Not at all   PHQ 9 Score 5       Learning Assessment:  Learning Assessment 11/9/2015   PRIMARY LEARNER Patient   PRIMARY LANGUAGE ENGLISH   LEARNER PREFERENCE PRIMARY LISTENING   ANSWERED BY patient   RELATIONSHIP SELF       Abuse Screening:  No flowsheet data found. Fall Risk  No flowsheet data found. Pt currently taking Antiplatelet therapy? ASA     Coordination of Care:  1. Have you been to the ER, urgent care clinic since your last visit? Hospitalized since your last visit? CABG x 5 2/8/19     2. Have you seen or consulted any other health care providers outside of the [address] [place] [address] since your last visit? Include any pap smears or colon screening.  No

## 2019-04-05 ENCOUNTER — HOSPITAL ENCOUNTER (OUTPATIENT)
Dept: CARDIAC REHAB | Age: 74
Discharge: HOME | End: 2019-04-05
Payer: MEDICARE

## 2019-04-05 VITALS — WEIGHT: 210 LBS | BODY MASS INDEX: 28.48 KG/M2

## 2019-04-05 PROCEDURE — 93798 PHYS/QHP OP CAR RHAB W/ECG: CPT

## 2019-04-08 ENCOUNTER — HOSPITAL ENCOUNTER (OUTPATIENT)
Dept: CARDIAC REHAB | Age: 74
Discharge: HOME | End: 2019-04-08
Payer: MEDICARE

## 2019-04-08 VITALS — WEIGHT: 210 LBS | BODY MASS INDEX: 28.48 KG/M2

## 2019-04-08 PROCEDURE — 93798 PHYS/QHP OP CAR RHAB W/ECG: CPT

## 2019-04-10 ENCOUNTER — HOSPITAL ENCOUNTER (OUTPATIENT)
Dept: CARDIAC REHAB | Age: 74
Discharge: HOME | End: 2019-04-10
Payer: MEDICARE

## 2019-04-10 VITALS — WEIGHT: 207 LBS | BODY MASS INDEX: 28.07 KG/M2

## 2019-04-10 PROCEDURE — 93797 PHYS/QHP OP CAR RHAB WO ECG: CPT

## 2019-04-10 PROCEDURE — 93798 PHYS/QHP OP CAR RHAB W/ECG: CPT

## 2019-04-12 ENCOUNTER — HOSPITAL ENCOUNTER (OUTPATIENT)
Dept: CARDIAC REHAB | Age: 74
Discharge: HOME | End: 2019-04-12
Payer: MEDICARE

## 2019-04-12 VITALS — WEIGHT: 207 LBS | BODY MASS INDEX: 28.07 KG/M2

## 2019-04-12 PROCEDURE — 93798 PHYS/QHP OP CAR RHAB W/ECG: CPT

## 2019-04-12 NOTE — PROGRESS NOTES
Patient name: Delphine Tanner. : 1945  
  
  
  
    
Goals Comments 1. Lift weights at the gym 
  [] initial 
[] met                            
[] not met [x] progressing 2. Complete Cardiac Rehab Program 
  [] initial 
[] met                            
[] not met [x] progressing    
3. Weigh 200 lbs 
  [] initial 
[] met                            
[] not met [x] progressing    
4.  
  [] initial 
[] met                            
[] not met 
[] progressing    
  
  
Frequency / Duration: Patient to be seen 3 times per week for 12 
 weeks:

## 2019-04-12 NOTE — CARDIO/PULMONARY
Patient name: Cherry Barksdale. : 1945 Visits from Start of Care: 12    Missed Visits: 0 Reporting Period: 3/19/19 to 19 Subjective Reports: Pt reports feeling stronger, engaging more in class. Goals Comments 1. Lift weights at the gym 
 [] met                 
[] not met [x] progressing  has not yet started at gym, but does 2/week at home with grandson 2. Complete Cardiac Rehab  
 [] met                 
[] not met [x] progressing Great attendance and participation for first 12 classes 3. Weigh 200 lbs [] met                 
[] not met [x] progressing Lost 3 pounds so far, weighs 207  
4. [] met                 
[] not met 
[] progressing Key functional changes: Increased exercise duration and intensity Problems/ barriers to goal attainment: none Assessment / Recommendations: Continue w/ rehab Treatment Plan: Other: Use Arm Ergometer, TM, Nustep and Recumb bike, increasing met levels Updated Goals to be accomplished in 24 treatments: 
 
Frequency / Duration: Patient to be seen 3 times per week for 12 weeks: 
 
Jeffery Bailey RN 2019 3:32 PM

## 2019-04-12 NOTE — PROGRESS NOTES
Cardiac ITP  
 
 CR PHASE II from 4/12/2019 in [facility] Treatment Diagnosis 1 CABG  
CABG Date 02/08/19 Referral Date 03/13/19 Co-morbidities Diabetes ITP Visit Type Re-Assessment ITP Next Review Date 05/05/19 Visit #/Total Visits 12/36 EF % 60 % Risk Stratification Low  
ITP Exercise, Psychosocial, Tobacco, Nutrition, Education Stages of Change Action DASI Total Score  Assisted Devices None Total Score  Safety Level I  Safety Level II  Test Other (comment)  [Using current exercise Rx as reassessment tool] Mode Treadmill, Bike, Stepper, Ergometer, Elliptical  
Duration per session 50 Intensity  METS       5.6  
RPE 11-13 Progression 6 METs at RPE of 12-13 Target Heart Rate  Resistance Training No  
Resting /76 Peak /80 Is BP WDL? Yes  [will monitor closely and report to physician as needed] Type cardio and weights Frequency 2/week Duration 60 min Resistance Training Yes Education Self pulse, Exercise safety, Signs/Symptoms to report, RPE scale, Equipment orientation, Warm up/Cool down, Physically active Target Goal(s) Individual exercise RX, Aerobic activity 30 + minutes/day  5 days/week, BP < 140/90 or < 130/80, if DM or CKD Patient Stated Exercise Goals Patient wants to get back to weight lifting Stages of Change Preparation [facility] Total Score   
PHQ 9 Score 2 Interventions No intervention indicated  [Improved PHQ9 Depression score from 5 to 2] Currently Taking Psychotropic Meds  Education Advanced directives, Benefits of CPR completion, Cardiac meds, Coping techniques, Impact self care behaviors on health, Environmental triggers, Relaxation techniques, Sexual activity, Signs/Symptoms of depression, Stress management class, Support groups, Tobacco dangers Target Goal(s) Assess presence or absence of depression using a valid screening tool, Demonstrates appropriate interaction with others, Engages in self-care behaviors, Maximizes coping skills, Positive support group Uses Stress Mgmt Techniques Yes  [Reading] Patient Stated Psychosocial Goals Patient does not feel stressed, anxious, or depressed Stages of Change Action Diabetes Yes  FBS Date  HbA1C 7.0 HbA1C Date  BG at Home Yes BG Frequency 1 time per day Diabetes Med(s) Metformin, Bydureon, Actos, Amaryl Diabetes Med(s) Change No  
Date Lipids Drawn 02/03/19 Total 100 HDL 50 LDL 33.8 Triglycerides 81 Lipid Med(s) Atorvastatin Lipid Med Change(s) Yes  [Lipitor increased from 20 to 40, not due to labs, phys. pref] Weight  93.9 kg (207 lb) Height  6' (1.829 m) BMI 28.13 Weight Goal Patient wants to get down to 200 lbs Waist Circumference  37\" Alcohol Special  
Type Beer Amount 1 beer Nutrition Assessment Tool RYP Rate Your Plate Total Score  Dietitian Consult Yes Nurse/Patient Discussion Yes Nutrition Goals Patient has none, likes his diet Nutrition Class Yes  [scheduled 4/17] Diabetes Education Referral No  
Lipid Clinic Referral No  
Weight Management Referral No  
Education  Patient Stated Nutrition Goals Patient does not want to change his diet, feels it is fine Learning Barrier Ready to learn Knowledge Test Score 9 Education Schedule Given Yes Education CAD, Risk factors, Med Compliance, Cardiac A&P, Signs/Symptoms of Angina, Sexuality Hypertension Yes Hypertension Controlled Yes Med(s) Change No  
BP Meds Metoprolol and Benicar Target Goals Medication compliance, Risk factors, Understand target guidelines for lipids, Understand target guidelines for B/P Patient Stated Education Goals Patient is willing to listen

## 2019-04-15 ENCOUNTER — HOSPITAL ENCOUNTER (OUTPATIENT)
Dept: CARDIAC REHAB | Age: 74
Discharge: HOME | End: 2019-04-15
Payer: MEDICARE

## 2019-04-15 VITALS — BODY MASS INDEX: 28.07 KG/M2 | WEIGHT: 207 LBS

## 2019-04-15 PROCEDURE — 93798 PHYS/QHP OP CAR RHAB W/ECG: CPT

## 2019-04-17 ENCOUNTER — HOSPITAL ENCOUNTER (OUTPATIENT)
Dept: CARDIAC REHAB | Age: 74
Discharge: HOME | End: 2019-04-17
Payer: MEDICARE

## 2019-04-17 VITALS — BODY MASS INDEX: 28.21 KG/M2 | WEIGHT: 208 LBS

## 2019-04-17 PROCEDURE — 93797 PHYS/QHP OP CAR RHAB WO ECG: CPT

## 2019-04-17 PROCEDURE — 93798 PHYS/QHP OP CAR RHAB W/ECG: CPT

## 2019-04-19 ENCOUNTER — HOSPITAL ENCOUNTER (OUTPATIENT)
Dept: CARDIAC REHAB | Age: 74
Discharge: HOME | End: 2019-04-19
Payer: MEDICARE

## 2019-04-19 VITALS — BODY MASS INDEX: 28.35 KG/M2 | WEIGHT: 209 LBS

## 2019-04-19 PROCEDURE — 93798 PHYS/QHP OP CAR RHAB W/ECG: CPT

## 2019-04-22 ENCOUNTER — HOSPITAL ENCOUNTER (OUTPATIENT)
Dept: CARDIAC REHAB | Age: 74
Discharge: HOME | End: 2019-04-22
Payer: MEDICARE

## 2019-04-22 VITALS — BODY MASS INDEX: 28.48 KG/M2 | WEIGHT: 210 LBS

## 2019-04-22 PROCEDURE — 93798 PHYS/QHP OP CAR RHAB W/ECG: CPT

## 2019-04-24 ENCOUNTER — HOSPITAL ENCOUNTER (OUTPATIENT)
Dept: CARDIAC REHAB | Age: 74
Discharge: HOME | End: 2019-04-24
Payer: MEDICARE

## 2019-04-24 VITALS — WEIGHT: 209 LBS | BODY MASS INDEX: 28.35 KG/M2

## 2019-04-24 PROCEDURE — 93797 PHYS/QHP OP CAR RHAB WO ECG: CPT

## 2019-04-24 PROCEDURE — 93798 PHYS/QHP OP CAR RHAB W/ECG: CPT

## 2019-04-26 ENCOUNTER — HOSPITAL ENCOUNTER (OUTPATIENT)
Dept: CARDIAC REHAB | Age: 74
Discharge: HOME | End: 2019-04-26
Payer: MEDICARE

## 2019-04-26 VITALS — BODY MASS INDEX: 28.35 KG/M2 | WEIGHT: 209 LBS

## 2019-04-26 PROCEDURE — 93798 PHYS/QHP OP CAR RHAB W/ECG: CPT

## 2019-04-29 ENCOUNTER — HOSPITAL ENCOUNTER (OUTPATIENT)
Dept: CARDIAC REHAB | Age: 74
Discharge: HOME | End: 2019-04-29
Payer: MEDICARE

## 2019-04-29 VITALS — BODY MASS INDEX: 28.35 KG/M2 | WEIGHT: 209 LBS

## 2019-04-29 PROCEDURE — 93798 PHYS/QHP OP CAR RHAB W/ECG: CPT

## 2019-05-01 ENCOUNTER — APPOINTMENT (OUTPATIENT)
Dept: CARDIAC REHAB | Age: 74
End: 2019-05-01
Payer: MEDICARE

## 2019-05-03 ENCOUNTER — HOSPITAL ENCOUNTER (OUTPATIENT)
Dept: CARDIAC REHAB | Age: 74
Discharge: HOME | End: 2019-05-03
Payer: MEDICARE

## 2019-05-03 VITALS — WEIGHT: 209 LBS | BODY MASS INDEX: 28.35 KG/M2

## 2019-05-03 PROCEDURE — 93798 PHYS/QHP OP CAR RHAB W/ECG: CPT

## 2019-05-06 ENCOUNTER — HOSPITAL ENCOUNTER (OUTPATIENT)
Dept: CARDIAC REHAB | Age: 74
Discharge: HOME | End: 2019-05-06
Payer: MEDICARE

## 2019-05-06 VITALS — WEIGHT: 210 LBS | BODY MASS INDEX: 28.48 KG/M2

## 2019-05-06 PROCEDURE — 93798 PHYS/QHP OP CAR RHAB W/ECG: CPT

## 2019-05-08 ENCOUNTER — APPOINTMENT (OUTPATIENT)
Dept: CARDIAC REHAB | Age: 74
End: 2019-05-08
Payer: MEDICARE

## 2019-05-10 ENCOUNTER — APPOINTMENT (OUTPATIENT)
Dept: CARDIAC REHAB | Age: 74
End: 2019-05-10
Payer: MEDICARE

## 2019-05-13 ENCOUNTER — APPOINTMENT (OUTPATIENT)
Dept: CARDIAC REHAB | Age: 74
End: 2019-05-13
Payer: MEDICARE

## 2019-05-15 ENCOUNTER — APPOINTMENT (OUTPATIENT)
Dept: CARDIAC REHAB | Age: 74
End: 2019-05-15
Payer: MEDICARE

## 2019-05-17 ENCOUNTER — APPOINTMENT (OUTPATIENT)
Dept: CARDIAC REHAB | Age: 74
End: 2019-05-17
Payer: MEDICARE

## 2019-05-20 ENCOUNTER — APPOINTMENT (OUTPATIENT)
Dept: CARDIAC REHAB | Age: 74
End: 2019-05-20
Payer: MEDICARE

## 2019-05-22 ENCOUNTER — APPOINTMENT (OUTPATIENT)
Dept: CARDIAC REHAB | Age: 74
End: 2019-05-22
Payer: MEDICARE

## 2019-05-24 ENCOUNTER — APPOINTMENT (OUTPATIENT)
Dept: CARDIAC REHAB | Age: 74
End: 2019-05-24
Payer: MEDICARE

## 2019-06-10 NOTE — PROGRESS NOTES
Sammy Arias Completed phase II cardiac rehab and attended 23/36 sessions from 3/19-5/6/19. Sammy Arias. is interested in maintaining optimal health and will work with Dr. Nury Segovia. Sammy Arias. has  improved his endurance and stamina through regular exercise, and has increased his Six Minute Walk test from met level of 3.3 to 5.87. Blood pressure is 118/77 and is WNL. [facility] also [facility], and depression scores (from 5 to 1),  and these were reviewed with patient. [address] to continue exercising at home and at the gym and has set revised goals that include cardio equipment, light weights and walking 3 to 5 times a week.     Heron Torres RN  6/10/2019

## 2019-06-10 NOTE — PROGRESS NOTES
Goals Comments   1. Lift weights at the gym    [] initial  [x] met                             [] not met  [] progressing     2. Complete Cardiac Rehab Program    [] initial  [x] met                             [] not met  [x] progressing     3. Weigh 200 lbs    [] initial  [] met                             [] not met  [x] progressing     4.     [] initial  [] met                             [] not met  [] progressing

## 2019-06-10 NOTE — PROGRESS NOTES
Cardiac ITP      CR PHASE II from 5/6/2019 in [facility]   Treatment Diagnosis 1 CABG   CABG Date 02/08/19   Referral Date 03/13/19   Co-morbidities Diabetes   ITP Visit Type Discharge, completed program   ITP Next Review Date    Visit #/Total Visits 23/36   EF % 60 %   Risk Stratification Low   ITP Exercise, Psychosocial, Tobacco, Nutrition, Education   Stages of Change Action, Maintenance   DASI Total Score 58.2   Assisted Devices None   Test Six minute walk test   Mode Treadmill, Bike, Stepper, Ergometer, Elliptical   Duration per session 35   Intensity  METS       3.3   RPE 11-13   Progression 4. mets in 40 minutes   Target Heart Rate    Resistance Training No   Resting /77   Peak /80   Is BP WDL?  No  [will monitor closely and report to physician as needed]   Type Walking   Frequency Daily   Duration 5 miles    Resistance Training Yes   Education Self pulse, Exercise safety, Signs/Symptoms to report, RPE scale, Equipment orientation, Warm up/Cool down, Physically active   Target Goal(s) Individual exercise RX, Aerobic activity 30 + minutes/day  5 days/week, BP < 140/90 or < 130/80, if DM or CKD   Patient Stated Exercise Goals Patient wants to get back to weight lifting   Stages of Change Preparation   [facility] Total Score 11   PHQ 9 Score 1   Interventions No intervention indicated   Currently Taking Psychotropic Meds No   Education Advanced directives, Benefits of CPR completion, Cardiac meds, Coping techniques, Impact self care behaviors on health, Environmental triggers, Relaxation techniques, Sexual activity, Signs/Symptoms of depression, Stress management class, Support groups, Tobacco dangers   Target Goal(s) Assess presence or absence of depression using a valid screening tool, Demonstrates appropriate interaction with others, Engages in self-care behaviors, Maximizes coping skills, Positive support group   Uses Stress Mgmt Techniques Yes  [Reading]   Patient Stated Psychosocial Goals Patient does not feel stressed, anxious, or depressed   Stages of Change Pre-Contemplation   Diabetes Yes      HbA1C 7.0   BG at Home Yes   BG Frequency 1 time per day   Diabetes Med(s) Metformin, Bydureon, Actos, Amaryl   Diabetes Med(s) Change No   Date Lipids Drawn 02/03/19   Total 100   HDL 50   LDL 33.8   Triglycerides 81   Lipid Med(s) Atorvastatin   Lipid Med Change(s) No   Weight  95.3 kg (210 lb)   Height  6' (1.829 m)   BMI 28.54   Weight Goal Patient wants to get down to 200 lbs   Waist Circumference  40   Alcohol Special   Type Beer   Amount 1 beer   Nutrition Assessment Tool RYP   Rate Your Plate Total Score 44   Dietitian Consult Yes   Nurse/Patient Discussion Yes   Nutrition Goals Patient has none, likes his diet   Nutrition Class Yes   Diabetes Education Referral No   Lipid Clinic Referral No   Weight Management Referral No   Education Special diet, Healthy eating, High fiber diet, Carb-controlled diet, DM & CAD relationship, Signs/Symptoms Hypo/Hyperglycemia, Low fat & cholesterol diet, Low sodium diet   Patient Stated Nutrition Goals Patient does not want to change his diet, feels it is fine   Learning Barrier Ready to learn   Knowledge Test Score 9   Education Schedule Given Yes   Education CAD, Risk factors, Med Compliance, Cardiac A&P, Signs/Symptoms of Angina, Sexuality   Hypertension Yes   Hypertension Controlled Yes   Med(s) Change No   BP Meds Metoprolol and Benicar   Target Goals Medication compliance, Risk factors, Understand target guidelines for lipids, Understand target guidelines for B/P   Patient Stated Education Goals Patient is willing to listen

## 2019-09-04 ENCOUNTER — OFFICE VISIT (OUTPATIENT)
Dept: CARDIOLOGY CLINIC | Age: 74
End: 2019-09-04

## 2019-09-04 VITALS
DIASTOLIC BLOOD PRESSURE: 84 MMHG | HEART RATE: 103 BPM | SYSTOLIC BLOOD PRESSURE: 140 MMHG | HEIGHT: 72 IN | WEIGHT: 214 LBS | BODY MASS INDEX: 28.99 KG/M2

## 2019-09-04 DIAGNOSIS — Z95.1 S/P CABG X 5: ICD-10-CM

## 2019-09-04 DIAGNOSIS — I25.84 CORONARY ATHEROSCLEROSIS DUE TO SEVERELY CALCIFIED CORONARY LESION: Primary | ICD-10-CM

## 2019-09-04 NOTE — PROGRESS NOTES
Shelly Hendrickson. presents today for   Chief Complaint   Patient presents with    Coronary Artery Disease     [facility]. preferred language for health care discussion is english/other. Is someone accompanying this pt? no    Is the patient using any DME equipment during [address]? no    Depression Screening:  3 most recent PHQ Screens 5/6/2019   Little interest or pleasure in doing things Not at all   Feeling down, depressed, irritable, or hopeless Not at all   Total Score PHQ 2 0   Trouble falling or staying asleep, or sleeping too much Not at all   Feeling tired or having little energy Several days   Poor appetite, weight loss, or overeating Not at all   Feeling bad about yourself - or that you are a failure or have let yourself or your family down Not at all   Trouble concentrating on things such as school, work, reading, or watching TV Not at all   Moving or speaking so slowly that other people could have noticed; or the opposite being so fidgety that others notice Not at all   Thoughts of being better off dead, or hurting yourself in some way Not at all   PHQ 9 Score 1       Learning Assessment:  Learning Assessment 11/9/2015   PRIMARY LEARNER Patient   PRIMARY LANGUAGE ENGLISH   LEARNER [facility]   ANSWERED BY patient   RELATIONSHIP SELF       Abuse Screening:  Abuse Screening Questionnaire 9/4/2019   Do you ever feel afraid of your partner? N   Are you in a relationship with someone who physically or mentally threatens you? N   Is it safe for you to go home? Y       Fall Risk  Fall Risk Assessment, last 12 mths 9/4/2019   Able to walk? Yes   Fall in past 12 months? No       Pt currently taking Anticoagulant therapy? no    Coordination of Care:  1. Have you been to the ER, urgent care clinic since your last visit? Hospitalized since your last visit? no    2.  Have you seen or consulted any other health care providers outside of the [address] [place] [address] since your last visit? Include any pap smears or colon screening.  no

## 2019-09-04 NOTE — PROGRESS NOTES
[address] Chief Complaint   Patient presents with    Coronary Artery Disease     5 MONTH     Dizziness       HPI    [address] Arias. is a 69 y.o. gentleman here for follow up of NSTEMI and CAD. As you know patient was found to have an NSTEMI at [facility] ER complaining of persistent chest pain despite a negative nuclear stress test in December 2018. Due to his high pretest probability for ACS we went straight to cath 2/4/ 2019 which revealed MV CAD (see report below) and had subsequently underwent CABG. His EF remained normal 60%. He progressed remarkably well with cardiac rehab. I do note in others documentation that his SBP has been creeping up since hospital discharge. His BB was increased significantly and more recently ARB doubled, and still he can see SBPs 150s-170s. He thinks this is not always right as there has been a 20 point difference btwn manual and automated readings. Regardless he feels great and has no exertional symptoms. Still feels some mild soreness at the incision site. After his last routine visit I tried to optimize his GDMT by increasing his statin to HI (20 to 40) and added HCTZ for better SBP control. He says once he started the HCTZ he started feeling dizzy all the time. He saw his PCP for this, who cut it down to 15 mg and dizziness is significantly improved almost gone but he has always felt dizzy when bending over/ squatting. We checked orthostatics today (see below). He wants to know does he truly \"need this. \" Besides that he is working out at [employer] and feeling well- just has questions about his medications. Cath 2/4/19 Chery:  · Culprit is mid RPDA 95% stenosis with MAME II flow. · Mid to distal left main 65-70% stenosis. · Mid LAD calcified 85% stenosis. · Overall normal left ventricular systolic function.         Past Medical History:   Diagnosis Date    Arthritis     Diabetes ([address]     Hypertension        Past Surgical History:   Procedure Laterality Date    HX HEENT      tumor removed from neck    HX ORTHOPAEDIC      elbow     LAP,CHOLECYSTECTOMY  11/12/15    Dr. Mora Sat      back surgery x2       Current Outpatient Medications   Medication Sig Dispense Refill    meloxicam (MOBIC) 15 mg tablet Take 15 mg by mouth daily.  olmesartan (BENICAR) 40 mg tablet Take  by mouth daily.  atorvastatin (LIPITOR) 40 mg tablet Take 1 Tab by mouth nightly. 30 Tab 6    hydroCHLOROthiazide (HYDRODIURIL) 25 mg tablet Take 1 Tab by mouth daily. (Patient taking differently: Take 15 mg by mouth daily.) 30 Tab 6    metoprolol succinate (TOPROL-XL) 100 mg tablet Take 1 Tab by mouth daily. 90 Tab 3    aspirin delayed-release 81 mg tablet Take 1 Tab by mouth daily. 30 Tab 2    multivitamin (ONE A DAY) tablet Take 1 Tab by mouth daily.  PROAIR HFA 90 mcg/actuation inhaler 2 Puffs by Mouth/Throat route every six (6) hours as needed for Wheezing.  fluticasone (FLONASE) 50 mcg/actuation nasal spray 1 [place] by Both Nostrils route two (2) times a day.  pioglitazone (ACTOS) 30 mg tablet Take 30 mg by mouth daily.  glimepiride (AMARYL) 1 mg tablet Take 2 mg by mouth every morning. Indications: type 2 diabetes mellitus      exenatide microspheres (BYDUREON) 2 mg serr 2 mg by SubCUTAneous route every seven (7) days.  omeprazole (PRILOSEC) 20 mg capsule Take 20 mg by mouth daily.  METFORMIN HCL (METFORMIN PO) Take 500 mg by mouth two (2) times a day.          Allergies   Allergen Reactions    Augmentin [Amoxicillin-Pot Clavulanate] Other (comments)     Turns his tongue black       Social History     Socioeconomic History    Marital status:      Spouse name: Charu Sorto Number of children: 2    Years of education: 15    Highest education level: Associate degree: occupational, technical, or vocational program   Occupational History    Not on file   Social Needs    Financial resource strain: Not hard at all    Food insecurity:     Worry: Never true     Inability: Never true    Transportation needs:     Medical: No     Non-medical: No   Tobacco Use    Smoking status: Former Smoker     Packs/day: 3.00     Years: 33.00     Pack years: 99.00     Last attempt to quit: 1990     Years since quittin.6    Smokeless tobacco: Former User   Substance and Sexual Activity    Alcohol use: Yes     Comment: rarely    Drug use: No    Sexual activity: Yes     Partners: Female   Lifestyle    Physical activity:     Days per week: 3 days     Minutes per session: 120 min    Stress: Not at all   Relationships    Social connections:     Talks on phone: More than three times a week     Gets together: More than three times a week     Attends Congregational service: More than 4 times per year     Active member of club or organization: No     Attends meetings of clubs or organizations: Never     Relationship status:     Intimate partner violence:     Fear of current or ex partner: No     Emotionally abused: No     Physically abused: No     Forced sexual activity: No   Other Topics Concern     Service Yes    Blood Transfusions No    Caffeine Concern Yes    Occupational Exposure Yes    Hobby Hazards No    Sleep Concern No    Stress Concern No    Weight Concern Yes    Special Diet No    Back Care No    Exercise Yes    Bike Helmet No    Seat Belt Yes    Self-Exams Yes   Social History Narrative    Not on file        Review of Systems    14 pt Review of Systems is negative unless otherwise mentioned in the HPI.     Wt Readings from Last 3 Encounters:   19 97.1 kg (214 lb)   19 95.3 kg (210 lb)   19 94.8 kg (209 lb)     Temp Readings from Last 3 Encounters:   19 98.7 °F (37.1 °C) (Oral)   19 97.9 °F (36.6 °C) (Oral)   19 97.6 °F (36.4 °C)     BP Readings from Last 3 Encounters:   19 140/84   19 158/86   19 148/82     Pulse Readings from Last 3 Encounters:   09/04/19 (!) 103   04/04/19 70   03/13/19 82       02/03/19   ECHO ADULT COMPLETE 02/03/2019 2/3/2019    Narrative · Calculated left ventricular ejection fraction is 60%. Normal left   ventricular wall motion, no regional wall motion abnormality noted. · No significant valvular pathology or effusion, no prior study for   comparison. Signed by: Zaida Martines DO       Physical Exam:    Visit Vitals  /84 (BP 1 Location: Left arm, BP Patient Position: Standing)   Pulse (!) 103   Ht 6' (1.829 m)   Wt 97.1 kg (214 lb)   PF 98 L/min   BMI 29.02 kg/m²      Physical Exam   Constitutional: He is oriented to person, place, and time. He appears well-developed and well-nourished. HENT:   Head: Normocephalic and atraumatic. Eyes: Pupils are equal, round, and reactive to light. EOM are normal. No scleral icterus. Cardiovascular: Normal rate, regular rhythm, normal heart sounds and intact distal pulses. Exam reveals no gallop and no friction rub. No murmur heard. Well healing c/d/i sternotomy noted   Pulmonary/Chest: Effort normal and breath sounds normal. No respiratory distress. He has no wheezes. He has no rales. He exhibits no tenderness. Abdominal: Soft. Bowel sounds are normal.   Musculoskeletal: He exhibits no edema. Neurological: He is alert and oriented to person, place, and time. Skin: Skin is warm and dry. No rash noted. Psychiatric: He has a normal mood and affect.        EKG last: NSR, normal axis and intervals, no ST segment abnormalities    Lab Results   Component Value Date/Time    Hemoglobin A1c 7.0 (H) 02/03/2019 01:42 AM     Lab Results   Component Value Date/Time    Cholesterol, total 100 02/04/2019 02:15 AM    HDL Cholesterol 50 02/04/2019 02:15 AM    LDL, calculated 33.8 02/04/2019 02:15 AM    VLDL, calculated 16.2 02/04/2019 02:15 AM    Triglyceride 81 02/04/2019 02:15 AM    CHOL/HDL Ratio 2.0 02/04/2019 02:15 AM       Impression and Plan:  Shelly Overall. is a 69 y.o. with:    1.) S/p NSTEMI with CABG 2/8/19  2.) Normal LV function  3.) HTN, more recently liable despite increased BB & ARB, known/ prefer goal avg SBP <150  4.) DM2, known  5.) +FH  6.) Former tobacco    1.) Can try to skip HCTZ, check SBP and if <150 ok to stop  2.) Continue high intensity statin, 40 mg qhs- significant time explaining indication/ benefit in ASCVD pts  3.) Otherwise continue medical therapy  4.) RTC 6 months routine CAD follow up    Overall patient is doing remarkably well after surgery. Did take the liberty of making some small adjustments to his medical therapy mostly in regards to his blood pressure management. No his systolic blood pressures have not been ideal and this may be multifactorial in the setting of his chronic arthritis (on Mobic) etc. He will check his SBP periodically at home with hopes of titrating off the low dose HCTZ. I hope I answered all of his questions. Thank you for allowing me to participate in the care of your patient, please do not hesitate to call with questions or concerns.     Kindest Regards,    Shobha Damico, DO

## 2019-10-08 ENCOUNTER — TELEPHONE (OUTPATIENT)
Dept: CARDIOLOGY CLINIC | Age: 74
End: 2019-10-08

## 2019-10-08 NOTE — TELEPHONE ENCOUNTER
Patient states he passed out in bathroom while urinating. Patient states it was less then a minute. Patient told PCP and PCP told him to call the office to be see sooner then scheduled appt. Patient denies any other complaints or concerns. I will forward this information to Dr Mayela Croft in case of further testing. Appt made for 2 weeks out. Will have to get ok to add patient on to schedule earlier.

## 2019-10-09 NOTE — TELEPHONE ENCOUNTER
Amarilis Sauceda, DO Burk, Anna Colby, RN   Caller: Unspecified (Yesterday,  7:11 AM)             Sure you can put him on sooner, what about tomorrow at 3:40?

## 2019-10-10 ENCOUNTER — OFFICE VISIT (OUTPATIENT)
Dept: CARDIOLOGY CLINIC | Age: 74
End: 2019-10-10

## 2019-10-10 VITALS
HEIGHT: 72 IN | DIASTOLIC BLOOD PRESSURE: 82 MMHG | WEIGHT: 214 LBS | OXYGEN SATURATION: 99 % | SYSTOLIC BLOOD PRESSURE: 120 MMHG | BODY MASS INDEX: 28.99 KG/M2 | HEART RATE: 69 BPM

## 2019-10-10 DIAGNOSIS — Z95.1 S/P CABG X 5: ICD-10-CM

## 2019-10-10 DIAGNOSIS — I10 ESSENTIAL HYPERTENSION: ICD-10-CM

## 2019-10-10 DIAGNOSIS — R55 SYNCOPE AND COLLAPSE: Primary | ICD-10-CM

## 2019-10-10 NOTE — PROGRESS NOTES
[address] [facility] [place] Chief Complaint   Patient presents with    Syncope     one episode 2 weeks ago. Patient fell back into bath tub. Pt. c/o feeling nauseous before and after passing out     Dizziness     on changing positions        HPI    Sammy Arias. is a 69 y.o. gentleman here for follow up of NSTEMI and CAD. As you know patient was found to have an NSTEMI at [facility] ER complaining of persistent chest pain despite a negative nuclear stress test in December 2018. Due to his high pretest probability for ACS we went straight to cath 2/4/ 2019 which revealed MV CAD (see report below) and had subsequently underwent CABG. His EF remained normal 60%. He progressed remarkably well with cardiac rehab. I do note in others documentation that his SBP has been creeping up since hospital discharge. His BB was increased significantly and more recently ARB doubled, and still he can see SBPs 150s-170s. He thinks this is not always right as there has been a 20 point difference btwn manual and automated readings. Regardless he feels great and has no exertional symptoms. Still feels some mild soreness at the incision site. After his last routine visit I tried to optimize his GDMT by increasing his statin to HI (20 to 40) and added HCTZ for better SBP control. He says once he started the HCTZ he started feeling dizzy all the time. He saw his PCP for this, who cut it down to 15 mg and dizziness is significantly improved almost gone but he has always felt dizzy when bending over/ squatting. We checked orthostatics today (see below). He wants to know does he truly \"need this. \" Besides that he is working out at [employer] and feeling well- just has questions about his medications. Cath 2/4/19 Chery:  · Culprit is mid RPDA 95% stenosis with MAME II flow. · Mid to distal left main 65-70% stenosis. · Mid LAD calcified 85% stenosis. · Overall normal left ventricular systolic function.     Since I last saw him, as you know- he had an episode of syncope. He got up at night to urinate, and while standing felt suddenly nauseated and next thing he knew he had fallen into the bathtub. +LOC, no bowel or bladder incontinence. His wife had been in bed but woke up to the sound of him falling. They didn't seek help and she mentioned it to their mutual PCP and \"ratted him out. \" He says if she didn't tell on him- he wasn't planning on mentioning it. This has happened before- maybe 17 yrs ago when hiking and he says he underwent extensive testing- including tilt table etc and everything was benign. Past Medical History:   Diagnosis Date    Arthritis     Diabetes ([facility] [address]     Hypertension        Past Surgical History:   Procedure Laterality Date    HX HEENT      tumor removed from neck    HX ORTHOPAEDIC      elbow     LAP,CHOLECYSTECTOMY  11/12/15    Dr. Anaya Current      back surgery x2       Current Outpatient Medications   Medication Sig Dispense Refill    meloxicam (MOBIC) 15 mg tablet Take 15 mg by mouth daily.  olmesartan (BENICAR) 40 mg tablet Take  by mouth daily.  atorvastatin (LIPITOR) 40 mg tablet Take 1 Tab by mouth nightly. 30 Tab 6    hydroCHLOROthiazide (HYDRODIURIL) 25 mg tablet Take 1 Tab by mouth daily. (Patient taking differently: Take 12.5 mg by mouth daily.) 30 Tab 6    metoprolol succinate (TOPROL-XL) 100 mg tablet Take 1 Tab by mouth daily. 90 Tab 3    aspirin delayed-release 81 mg tablet Take 1 Tab by mouth daily. 30 Tab 2    multivitamin (ONE A DAY) tablet Take 1 Tab by mouth daily.  PROAIR HFA 90 mcg/actuation inhaler 2 Puffs by Mouth/Throat route every six (6) hours as needed for Wheezing.  fluticasone (FLONASE) 50 mcg/actuation nasal spray 1 [place] by Both Nostrils route two (2) times a day.  pioglitazone (ACTOS) 30 mg tablet Take 30 mg by mouth daily.  glimepiride (AMARYL) 1 mg tablet Take 2 mg by mouth every morning.  Indications: type 2 diabetes mellitus      exenatide microspheres (BYDUREON) 2 mg serr 2 mg by SubCUTAneous route every seven (7) days.  omeprazole (PRILOSEC) 20 mg capsule Take 20 mg by mouth daily.  METFORMIN HCL (METFORMIN PO) Take 500 mg by mouth two (2) times a day.          Allergies   Allergen Reactions    Augmentin [Amoxicillin-Pot Clavulanate] Other (comments)     Turns his tongue black       Social History     Socioeconomic History    Marital status:      Spouse name: Marcos Haskins Number of children: 2    Years of education: 15    Highest education level: Associate degree: occupational, technical, or vocational program   Occupational History    Not on file   Social Needs    Financial resource strain: Not hard at all   [employer] insecurity:     Worry: Never true     Inability: Never true   [place] [employer] needs:     Medical: No     Non-medical: No   Tobacco Use    Smoking status: Former Smoker     Packs/day: 3.00     Years: 33.00     Pack years: 99.00     Last attempt to quit: 1990     Years since quittin.7    Smokeless tobacco: Former User   Substance and Sexual Activity    Alcohol use: Yes     Comment: rarely    Drug use: No    Sexual activity: Yes     Partners: Female   Lifestyle    Physical activity:     Days per week: 3 days     Minutes per session: 120 min    Stress: Not at all   Relationships    Social connections:     Talks on phone: More than three times a week     Gets together: More than three times a week     Attends Orthodoxy service: More than 4 times per year     Active member of club or organization: No     Attends meetings of clubs or organizations: Never     Relationship status:     Intimate partner violence:     Fear of current or ex partner: No     Emotionally abused: No     Physically abused: No     Forced sexual activity: No   Other Topics Concern     Service Yes    Blood Transfusions No    Caffeine Concern Yes    Occupational Exposure Yes    Hobby Hazards No    Sleep Concern No    Stress Concern No    Weight Concern Yes    Special Diet No    Back Care No    Exercise Yes    Bike Helmet No    Seat Belt Yes    Self-Exams Yes   Social History Narrative    Not on file        Review of Systems    14 pt Review of Systems is negative unless otherwise mentioned in the HPI. Wt Readings from Last 3 Encounters:   10/10/19 97.1 kg (214 lb)   09/04/19 97.1 kg (214 lb)   05/06/19 95.3 kg (210 lb)     Temp Readings from Last 3 Encounters:   03/13/19 98.7 °F (37.1 °C) (Oral)   02/20/19 97.9 °F (36.6 °C) (Oral)   02/18/19 97.6 °F (36.4 °C)     BP Readings from Last 3 Encounters:   10/10/19 120/82   09/04/19 140/84   04/04/19 158/86     Pulse Readings from Last 3 Encounters:   10/10/19 69   09/04/19 (!) 103   04/04/19 70       02/03/19   ECHO ADULT COMPLETE 02/03/2019 2/3/2019    Narrative · Calculated left ventricular ejection fraction is 60%. Normal left   ventricular wall motion, no regional wall motion abnormality noted. · No significant valvular pathology or effusion, no prior study for   comparison. Signed by: Suzy Rivera DO       Physical Exam:    Visit Vitals  /82   Pulse 69   Ht 6' (1.829 m)   Wt 97.1 kg (214 lb)   SpO2 99%   BMI 29.02 kg/m²      Physical Exam   Constitutional: He is oriented to person, place, and time. He appears well-developed and well-nourished. HENT:   Head: Normocephalic and atraumatic. Eyes: Pupils are equal, round, and reactive to light. EOM are normal. No scleral icterus. Cardiovascular: Normal rate, regular rhythm, normal heart sounds and intact distal pulses. Exam reveals no gallop and no friction rub. No murmur heard. Well healing c/d/i sternotomy noted   Pulmonary/Chest: Effort normal and breath sounds normal. No respiratory distress. He has no wheezes. He has no rales. He exhibits no tenderness. Abdominal: Soft. Bowel sounds are normal.   Musculoskeletal: He exhibits no edema.    Neurological: He is alert and oriented to person, place, and time. Skin: Skin is warm and dry. No rash noted. Psychiatric: He has a normal mood and affect. EKG last: NSR, normal axis and intervals, no ST segment abnormalities    Lab Results   Component Value Date/Time    Hemoglobin A1c 7.0 (H) 02/03/2019 01:42 AM     Lab Results   Component Value Date/Time    Cholesterol, total 100 02/04/2019 02:15 AM    HDL Cholesterol 50 02/04/2019 02:15 AM    LDL, calculated 33.8 02/04/2019 02:15 AM    VLDL, calculated 16.2 02/04/2019 02:15 AM    Triglyceride 81 02/04/2019 02:15 AM    CHOL/HDL Ratio 2.0 02/04/2019 02:15 AM       Impression and Plan:  Ching Varghese is a 69 y.o. with:    1.) S/p NSTEMI with CABG 2/8/19  2.) Normal LV function  3.) HTN, more recently liable despite increased BB & ARB, known/ prefer goal avg SBP <150  4.) DM2, known  5.) +FH  6.) Former tobacco  7.) Syncopal episode, suspect vasovagal    1.) Pt particular about meds and though I thought we could stop his HCTZ last visit he tells me he never did that bc his SBP was finally under good control and doesn't want to be \"more lenient\" with his BP  2.) Did consider MCT but significant arrthymia not as likely given clinical hx, normal LV, and good cabg result- reasonable to monitor his symptoms, techniques suggested to avoid LOC when he feels premonition (such as drink water! And sit/ lay down as soon as feel premonition)  3.) RTC as scheduled, call sooner if problems    Thank you for allowing me to participate in the care of your patient, please do not hesitate to call with questions or concerns.     Kindest Regards,    Hair Hoff, DO

## 2019-10-10 NOTE — PROGRESS NOTES
Bandar Riley. presents today for   Chief Complaint   Patient presents with    Syncope     one episode 2 weeks ago. Patient fell back into bath tub. Pt. c/o feeling nauseous before and after passing out     Dizziness     on changing positions        Bandar Riley. preferred language for health care discussion is english/other. Is someone accompanying this pt? yes    Is the patient using any DME equipment during [address]? no    Depression Screening:  3 most recent PHQ Screens 5/6/2019   Little interest or pleasure in doing things Not at all   Feeling down, depressed, irritable, or hopeless Not at all   Total Score PHQ 2 0   Trouble falling or staying asleep, or sleeping too much Not at all   Feeling tired or having little energy Several days   Poor appetite, weight loss, or overeating Not at all   Feeling bad about yourself - or that you are a failure or have let yourself or your family down Not at all   Trouble concentrating on things such as school, work, reading, or watching TV Not at all   Moving or speaking so slowly that other people could have noticed; or the opposite being so fidgety that others notice Not at all   Thoughts of being better off dead, or hurting yourself in some way Not at all   PHQ 9 Score 1       Learning Assessment:  Learning Assessment 11/9/2015   PRIMARY LEARNER Patient   PRIMARY LANGUAGE ENGLISH   LEARNER [facility]   ANSWERED BY patient   RELATIONSHIP SELF       Abuse Screening:  Abuse Screening Questionnaire 9/4/2019   Do you ever feel afraid of your partner? N   Are you in a relationship with someone who physically or mentally threatens you? N   Is it safe for you to go home? Y       Fall Risk  Fall Risk Assessment, last 12 mths 9/4/2019   Able to walk? Yes   Fall in past 12 months? No       Pt currently taking Anticoagulant therapy? no    Coordination of Care:  1. Have you been to the ER, urgent care clinic since your last visit?  Hospitalized since your last visit? no    2. Have you seen or consulted any other health care providers outside of the [address] [place] [address] since your last visit? Include any pap smears or colon screening.  no

## 2019-11-13 RX ORDER — ATORVASTATIN CALCIUM 40 MG/1
TABLET, FILM COATED ORAL
Qty: 30 TAB | Refills: 6 | Status: SHIPPED | OUTPATIENT
Start: 2019-11-13 | End: 2020-06-16

## 2019-11-29 ENCOUNTER — HOSPITAL ENCOUNTER (OUTPATIENT)
Dept: LAB | Age: 74
Discharge: HOME | End: 2019-11-29
Payer: MEDICARE

## 2019-11-29 PROCEDURE — 88313 SPECIAL STAINS GROUP 2: CPT

## 2019-11-29 PROCEDURE — 88305 TISSUE EXAM BY PATHOLOGIST: CPT

## 2020-02-05 ENCOUNTER — OFFICE VISIT (OUTPATIENT)
Dept: CARDIOLOGY CLINIC | Age: 75
End: 2020-02-05

## 2020-02-05 VITALS
SYSTOLIC BLOOD PRESSURE: 135 MMHG | OXYGEN SATURATION: 99 % | BODY MASS INDEX: 29.29 KG/M2 | HEART RATE: 70 BPM | WEIGHT: 216 LBS | RESPIRATION RATE: 20 BRPM | DIASTOLIC BLOOD PRESSURE: 85 MMHG

## 2020-02-05 DIAGNOSIS — I10 ESSENTIAL HYPERTENSION: Primary | ICD-10-CM

## 2020-02-05 RX ORDER — METOPROLOL SUCCINATE 100 MG/1
100 TABLET, EXTENDED RELEASE ORAL DAILY
Qty: 90 TAB | Refills: 3 | Status: SHIPPED | OUTPATIENT
Start: 2020-02-05 | End: 2021-02-17

## 2020-02-05 NOTE — PROGRESS NOTES
3300 Count includes the [facility] Chief Complaint   Patient presents with    Follow-up   f/u CAD, NSTEMI, s/p CABG    HPI    3300 Maria G Arias. is a 69 y.o. gentleman here for follow up of NSTEMI and CAD. As you know patient was found to have an NSTEMI at [facility] complaining of persistent chest pain despite a negative nuclear stress test in December 2018. Due to his high pretest probability for ACS we went straight to cath 2/4/ 2019 which revealed MV CAD (see report below) and had subsequently underwent CABG. His EF remained normal 60%. He progressed remarkably well with cardiac rehab. I do note in others documentation that his SBP has been creeping up since hospital discharge. His BB was increased significantly and more recently ARB doubled, and still he can see SBPs 150s-170s. He thinks this is not always right as there has been a 20 point difference btwn manual and automated readings. Regardless he feels great and has no exertional symptoms. Still feels some mild soreness at the incision site. After his last routine visit I tried to optimize his GDMT by increasing his statin to HI (20 to 40) and added HCTZ for better SBP control. He says once he started the HCTZ he started feeling dizzy all the time. He saw his PCP for this, who cut it down to 15 mg and dizziness is significantly improved almost gone but he has always felt dizzy when bending over/ squatting. We checked orthostatics today (see below). He wants to know does he truly \"need this. \" Besides that he is working out at [employer] and feeling well- just has questions about his medications. Cath 2/4/19 Chery:  · Culprit is mid RPDA 95% stenosis with MAME II flow. · Mid to distal left main 65-70% stenosis. · Mid LAD calcified 85% stenosis. · Overall normal left ventricular systolic function. He had an episode of syncope. He got up at night to urinate, and while standing felt suddenly nauseated and next thing he knew he had fallen into the bathtub.  +LOC, no bowel or bladder incontinence. His wife had been in bed but woke up to the sound of him falling. They didn't seek help and she mentioned it to their mutual PCP and \"ratted him out. \" He says if she didn't tell on him- he wasn't planning on mentioning it. This has happened before- maybe 17 yrs ago when hiking and he says he underwent extensive testing- including tilt table etc and everything was benign. Since I last saw him, no complaints. Goes on treadmill fast pace, no CP. Can notice sporadic SOB/ SIMONS, no LE edema. He can feel sudden onset dizziness when bending over/ squatting. Past Medical History:   Diagnosis Date    Arthritis     Diabetes ([address]     Hypertension        Past Surgical History:   Procedure Laterality Date    HX HEENT      tumor removed from neck    HX ORTHOPAEDIC      elbow     LAP,CHOLECYSTECTOMY  11/12/15    Dr. Georgette Michael      back surgery x2       Current Outpatient Medications   Medication Sig Dispense Refill    atorvastatin (LIPITOR) 40 mg tablet TAKE 1 TABLET BY MOUTH NIGHTLY 30 Tab 6    meloxicam (MOBIC) 15 mg tablet Take 15 mg by mouth daily.  olmesartan (BENICAR) 40 mg tablet Take  by mouth daily.  hydroCHLOROthiazide (HYDRODIURIL) 25 mg tablet Take 1 Tab by mouth daily. (Patient taking differently: Take 12.5 mg by mouth daily.) 30 Tab 6    metoprolol succinate (TOPROL-XL) 100 mg tablet Take 1 Tab by mouth daily. 90 Tab 3    aspirin delayed-release 81 mg tablet Take 1 Tab by mouth daily. 30 Tab 2    multivitamin (ONE A DAY) tablet Take 1 Tab by mouth daily.  PROAIR HFA 90 mcg/actuation inhaler 2 Puffs by Mouth/Throat route every six (6) hours as needed for Wheezing.  fluticasone (FLONASE) 50 mcg/actuation nasal spray 1 [place] by Both Nostrils route two (2) times a day.  pioglitazone (ACTOS) 30 mg tablet Take 30 mg by mouth daily.  glimepiride (AMARYL) 1 mg tablet Take 2 mg by mouth every morning.  Indications: type 2 diabetes mellitus      exenatide microspheres (BYDUREON) 2 mg serr 2 mg by SubCUTAneous route every seven (7) days.  omeprazole (PRILOSEC) 20 mg capsule Take 20 mg by mouth daily.  METFORMIN HCL (METFORMIN PO) Take 500 mg by mouth two (2) times a day.          Allergies   Allergen Reactions    Augmentin [Amoxicillin-Pot Clavulanate] Other (comments)     Turns his tongue black       Social History     Socioeconomic History    Marital status:      Spouse name: Azalee Area Number of children: 2    Years of education: 15    Highest education level: Associate degree: occupational, technical, or vocational program   Occupational History    Not on file   Social Needs    Financial resource strain: Not hard at all   Immy insecurity:     Worry: Never true     Inability: Never true   Yellowsmith needs:     Medical: No     Non-medical: No   Tobacco Use    Smoking status: Former Smoker     Packs/day: 3.00     Years: 33.00     Pack years: 99.00     Last attempt to quit: 1990     Years since quittin.1    Smokeless tobacco: Former User   Substance and Sexual Activity    Alcohol use: Yes     Comment: rarely    Drug use: No    Sexual activity: Yes     Partners: Female   Lifestyle    Physical activity:     Days per week: 3 days     Minutes per session: 120 min    Stress: Not at all   Relationships    Social connections:     Talks on phone: More than three times a week     Gets together: More than three times a week     Attends Faith service: More than 4 times per year     Active member of club or organization: No     Attends meetings of clubs or organizations: Never     Relationship status:     Intimate partner violence:     Fear of current or ex partner: No     Emotionally abused: No     Physically abused: No     Forced sexual activity: No   Other Topics Concern     Service Yes    Blood Transfusions No    Caffeine Concern Yes    Occupational Exposure Yes  Hobby Hazards No    Sleep Concern No    Stress Concern No    Weight Concern Yes    Special Diet No    Back Care No    Exercise Yes    Bike Helmet No    Seat Belt Yes    Self-Exams Yes   Social History Narrative    Not on file        Review of Systems    14 pt Review of Systems is negative unless otherwise mentioned in the HPI. Wt Readings from Last 3 Encounters:   02/05/20 98 kg (216 lb)   10/10/19 97.1 kg (214 lb)   09/04/19 97.1 kg (214 lb)     Temp Readings from Last 3 Encounters:   03/13/19 98.7 °F (37.1 °C) (Oral)   02/20/19 97.9 °F (36.6 °C) (Oral)   02/18/19 97.6 °F (36.4 °C)     BP Readings from Last 3 Encounters:   10/10/19 120/82   09/04/19 140/84   04/04/19 158/86     Pulse Readings from Last 3 Encounters:   10/10/19 69   09/04/19 (!) 103   04/04/19 70       02/03/19   ECHO ADULT COMPLETE 02/03/2019 2/3/2019    Narrative · Calculated left ventricular ejection fraction is 60%. Normal left   ventricular wall motion, no regional wall motion abnormality noted. · No significant valvular pathology or effusion, no prior study for   comparison. Signed by: Tim Carolina DO       Physical Exam:    Visit Vitals  Resp 20   Wt 98 kg (216 lb)   BMI 29.29 kg/m²      Physical Exam   Constitutional: He is oriented to person, place, and time. He appears well-developed and well-nourished. HENT:   Head: Normocephalic and atraumatic. Eyes: Pupils are equal, round, and reactive to light. EOM are normal. No scleral icterus. Cardiovascular: Normal rate, regular rhythm, normal heart sounds and intact distal pulses. Exam reveals no gallop and no friction rub. No murmur heard. Well healing c/d/i sternotomy noted   Pulmonary/Chest: Effort normal and breath sounds normal. No respiratory distress. He has no wheezes. He has no rales. He exhibits no tenderness. Abdominal: Soft. Bowel sounds are normal.   Musculoskeletal:         General: No edema.    Neurological: He is alert and oriented to person, place, and time. Skin: Skin is warm and dry. No rash noted. Psychiatric: He has a normal mood and affect. EKG last: NSR, normal axis and intervals, no ST segment abnormalities    Lab Results   Component Value Date/Time    Hemoglobin A1c 7.0 (H) 02/03/2019 01:42 AM     Lab Results   Component Value Date/Time    Cholesterol, total 100 02/04/2019 02:15 AM    HDL Cholesterol 50 02/04/2019 02:15 AM    LDL, calculated 33.8 02/04/2019 02:15 AM    VLDL, calculated 16.2 02/04/2019 02:15 AM    Triglyceride 81 02/04/2019 02:15 AM    CHOL/HDL Ratio 2.0 02/04/2019 02:15 AM       Impression and Plan:  Yenny Davila. is a 69 y.o. with:    1.) S/p NSTEMI with CABG 2/8/19  2.) Normal LV function  3.) HTN, well controlled  4.) DM2, known  5.) +FH  6.) Former tobacco  7.) h/O Syncopal episode, suspect vasovagal    1.) Continue current BP regimen, BB refilled  2.) Will plan on decreasing BB to 50 mg day next appt, and eventually stop if possible  3.) Otherwise recommend HI statin and ASA lifelong  4.) RTC yearly routine follow up CAD, pt doing well    Thank you for allowing me to participate in the care of your patient, please do not hesitate to call with questions or concerns.     Kindest Regards,    Venice Ray, DO

## 2020-06-16 RX ORDER — ATORVASTATIN CALCIUM 40 MG/1
TABLET, FILM COATED ORAL
Qty: 30 TAB | Refills: 5 | Status: SHIPPED | OUTPATIENT
Start: 2020-06-16 | End: 2020-12-22

## 2020-12-23 RX ORDER — ATORVASTATIN CALCIUM 40 MG/1
TABLET, FILM COATED ORAL
Qty: 30 TAB | Refills: 6 | Status: SHIPPED | OUTPATIENT
Start: 2020-12-23 | End: 2021-08-06 | Stop reason: SDUPTHER

## 2021-01-01 NOTE — PROGRESS NOTES
Problem: Mobility Impaired (Adult and Pediatric) Goal: *Acute Goals and Plan of Care (Insert Text) Physical Therapy Goals Initiated 2/9/2019 and to be accomplished within 7 day(s) 1. Patient will move from supine to sit and sit to supine , scoot up and down and roll side to side in bed with modified independence. 2.  Patient will transfer from bed to chair and chair to bed with modified independence using the least restrictive device. 3.  Patient will perform sit to stand with modified independence. 4.  Patient will ambulate with modified independence for >250 feet with the least restrictive device. 5.  Patient will ascend/descend 4 stairs with 1 handrail(s) with supervision/set-up. Outcome: Progressing Towards Goal 
physical Therapy TREATMENT Patient: Jairo Cedillo (77 y.o. male) Date: 2/12/2019 Diagnosis: Chest pain Chest pain Chest pain NSTEMI (non-ST elevated myocardial infarction) ([facility] [address] Procedure(s) (LRB): 
CORONARY ARTERY BYPASS GRAFT (CABG) TIMES FIVE, LEFT ENDOSCOPIC SAPHENOUS VEIN GRAFT HARVEST AND LEFT INTERNAL MAMMARY HARVEST/STERNALOK (N/A) ESOPHAGEAL TRANS ECHOCARDIOGRAM (N/A) 4 Days Post-Op Precautions:    
Chart, physical therapy assessment, plan of care and goals were reviewed. OBJECTIVE/ASSESSMENT: 
Pt was cleared by nursing to participate in therapy and pt was agreeable to physical therapy treatment. Pt stood from recliner independently with independent use of heart hugger. Reviewed sternal precautions with pt and pt able to verbalize. Pt ambulated 12 feet to bathroom with no AD and transferred on/off toilet independently. He independently performed hygiene. Pt then ambulated to stairwell (100 feet) with supervision and no AD and negotiated 4 stairs with 1 railing with supervision. Pt then ambulated  300 additional feet with no AD, with supervision. No loss of balance, no shortness of breath noted.   Pt returned to recliner independently and nursing was notified. Education:  
[]         Bed mobility [x]         Transfers 
[x]         Ambulation 
[x]         Assistive device management 
[x]         Stairs 
[]         Body mechanics [x]         Position change 
[x]         Activity pacing/energy conservation 
[]         Other: 
Progression toward goals: 
[x]      Improving appropriately and progressing toward goals 
[]      Improving slowly and progressing toward goals 
[]      Not making progress toward goals and plan of care will be adjusted PLAN: 
Patient continues to benefit from skilled intervention to address the above impairments. Continue treatment per established plan of care. Discharge Recommendations:  Home Health Further Equipment Recommendations for Discharge:  N/A  
 
SUBJECTIVE:  
Patient stated I'll be ready to go home tomorrow.  OBJECTIVE DATA SUMMARY:  
Critical Behavior: 
Neurologic State: Alert Orientation Level: Oriented X4 Cognition: Appropriate decision making, Appropriate for age attention/concentration, Appropriate safety awareness, Follows commands Safety/Judgement: Awareness of environment, Fall prevention Functional Mobility Training: 
Transfers: 
Sit to Stand: Independent Stand to Sit: Independent Balance: 
Sitting: Intact Standing: Intact; Without supportAmbulation/Gait Training: 
Distance (ft): 400 Feet (ft) Assistive Device: (none) Ambulation - Level of Assistance: Supervision Speed/Anahy: Pace decreased (<100 feet/min) Stairs: 
Number of Stairs Trained: 4 Stairs - Level of Assistance: Stand-by assistance Rail Use: Left Pain: 
Pre session: 0 Post session: 4 (when coughing) Activity Tolerance:  
Good Please refer to the flowsheet for vital signs taken during this treatment. After treatment:  
[x] Patient left in no apparent distress sitting up in chair 
[] Patient left in no apparent distress in bed 
[x] Call bell left within reach [x] Nursing notified [] Caregiver present 
[] Bed alarm activated Leti Guerrero, PT Time Calculation: 18 mins 4

## 2021-02-05 ENCOUNTER — APPOINTMENT (OUTPATIENT)
Dept: GENERAL RADIOLOGY | Age: 76
End: 2021-02-05
Attending: EMERGENCY MEDICINE
Payer: MEDICARE

## 2021-02-05 ENCOUNTER — HOSPITAL ENCOUNTER (EMERGENCY)
Age: 76
Discharge: HOME | End: 2021-02-05
Attending: EMERGENCY MEDICINE
Payer: MEDICARE

## 2021-02-05 ENCOUNTER — APPOINTMENT (OUTPATIENT)
Dept: CT IMAGING | Age: 76
End: 2021-02-05
Attending: EMERGENCY MEDICINE
Payer: MEDICARE

## 2021-02-05 VITALS
RESPIRATION RATE: 18 BRPM | OXYGEN SATURATION: 99 % | SYSTOLIC BLOOD PRESSURE: 157 MMHG | TEMPERATURE: 98.2 F | HEIGHT: 71 IN | BODY MASS INDEX: 28.28 KG/M2 | DIASTOLIC BLOOD PRESSURE: 81 MMHG | HEART RATE: 70 BPM | WEIGHT: 202 LBS

## 2021-02-05 DIAGNOSIS — R07.81 RIB PAIN ON LEFT SIDE: Primary | ICD-10-CM

## 2021-02-05 PROCEDURE — 73562 X-RAY EXAM OF KNEE 3: CPT

## 2021-02-05 PROCEDURE — 99282 EMERGENCY DEPT VISIT SF MDM: CPT

## 2021-02-05 PROCEDURE — 71250 CT THORAX DX C-: CPT

## 2021-02-05 NOTE — Clinical Note
[address] [place] [address] Dr [facility] EMERGENCY DEPT 
[address] [facility] [address] [phone] Work/School Note Date: 2/5/2021 To Whom It May concern: 
 
 
Ronen Horton was seen and treated today in the emergency room by the following provider(s): 
Attending Provider: Gary Travis MD.   
 
Ronen Horton is excused from work/school on 02/05/21. He is clear to return to work/school on 02/06/21. Sincerely, Ricki Power MD

## 2021-02-05 NOTE — ED PROVIDER NOTES
EMERGENCY DEPARTMENT HISTORY AND PHYSICAL EXAM    9:48 AM  Date: 2021  Patient Name: Milvia Weldon. History of Presenting Illness     Chief Complaint   Patient presents with    Fall    Knee Pain    Rib Pain        History Provided By: patient   With past medical history of coronary artery bypass graft, hypertension, diabetes presents with 6 days of left lower rib pain after a fall. States that his pain is constant, sharp, getting worse, is more severe with movement. Patient said had some left knee pain  That is now improved. Able to ambulate. Denies any head injury. PCP: Paulino Stacy MD    Past History     Past Medical History:  Past Medical History:   Diagnosis Date    Arthritis     Diabetes ([address]     Hypertension        Past Surgical History:  Past Surgical History:   Procedure Laterality Date    HX HEENT      tumor removed from neck    HX ORTHOPAEDIC      elbow     NEUROLOGICAL PROCEDURE UNLISTED      back surgery x2    MI LAP,CHOLECYSTECTOMY  11/12/15    Dr. Aj Garcia       Family History:  Family History   Problem Relation Age of Onset    Cancer Mother     Heart Failure Father        Social History:  Social History     Tobacco Use    Smoking status: Former Smoker     Packs/day: 3.00     Years: 33.00     Pack years: 99.00     Quit date: 1990     Years since quittin.1    Smokeless tobacco: Former User   Substance Use Topics    Alcohol use: Yes     Comment: rarely    Drug use: No       Allergies: Allergies   Allergen Reactions    Augmentin [Amoxicillin-Pot Clavulanate] Other (comments)     Turns his tongue black       Review of Systems   Review of Systems   Constitutional: Negative for activity change, appetite change and chills. HENT: Negative for congestion, ear discharge, ear pain and sore throat. Eyes: Negative for photophobia and pain. Respiratory: Negative for cough and choking. Cardiovascular: Negative for palpitations and leg swelling. Gastrointestinal: Negative for anal bleeding and rectal pain. Endocrine: Negative for polydipsia and polyuria. Genitourinary: Negative for genital sores and urgency. Musculoskeletal: Negative for arthralgias and myalgias. Neurological: Negative for dizziness, seizures and speech difficulty. Psychiatric/Behavioral: Negative for hallucinations, self-injury and suicidal ideas. Physical Exam     No data found. Physical Exam  Vitals signs and nursing note reviewed. Constitutional:       Appearance: He is well-developed. HENT:      Head: Normocephalic and atraumatic. Eyes:      General:         Right eye: No discharge. Left eye: No discharge. Neck:      Musculoskeletal: Normal range of motion and neck supple. Cardiovascular:      Rate and Rhythm: Normal rate and regular rhythm. Heart sounds: Normal heart sounds. No murmur. Pulmonary:      Effort: Pulmonary effort is normal. No respiratory distress. Breath sounds: Normal breath sounds. No stridor. No wheezing or rales. Chest:      Chest wall: No tenderness. Abdominal:      General: Bowel sounds are normal. There is no distension. Palpations: Abdomen is soft. Tenderness: There is no abdominal tenderness. There is no guarding or rebound. Musculoskeletal: Normal range of motion. Comments: Left lower rib tenderness   Skin:     General: Skin is warm and dry. Neurological:      Mental Status: He is alert and oriented to person, place, and time. Comments:           Diagnostic Study Results     Labs -  No results found for this or any previous visit (from the past 12 hour(s)). Radiologic Studies -   Ct Chest Wo Cont    Result Date: 2/5/2021  No displaced rib fracture is identified. No acute pulmonary process. Additional above. Xr Knee Lt 3 V    Result Date: 2/5/2021  Findings/impression: No acute cortical fracture or dislocation.  There is chronic fragmentation at the tibial tuberosity which can be seen within the spectrum of Osgood-Schlatter disease/jumper's knee. Probable small knee effusion. Soft tissue prominence along the medial knee is nonspecific with a few clips in place. Correlate clinically. Mild atherosclerosis noted throughout. Medical Decision Making     ED Course: Progress Notes, Reevaluation, and Consults:    9:48 AM Initial assessment performed. The patients presenting problems have been discussed, and they/their family are in agreement with the care plan formulated and outlined with them. I have encouraged them to ask questions as they arise throughout their visit. Provider Notes (Medical Decision Making):  Presents with left lower rib tenderness. We are going to get a CT of his chest  Patient is hypoxic, not tachypneic  CT chest no rib fracture, no pulmonary process  No knee fracture patient to be discharged with PMD follow-up          Vital Signs-Reviewed the patient's vital signs. Reviewed pt's pulse ox reading. Records Reviewed:  old medical records (Time of Review: 9:48 AM)  -I am the first provider for this patient.  -I reviewed the vital signs, available nursing notes, past medical history, past surgical history, family history and social history. Current Outpatient Medications   Medication Sig Dispense Refill    dulaglutide (TRULICITY SC) by SubCUTAneous route.  atorvastatin (LIPITOR) 40 mg tablet Take 1 tablet by mouth nightly 30 Tab 6    metoprolol succinate (TOPROL-XL) 100 mg tablet Take 1 Tab by mouth daily. 90 Tab 3    meloxicam (MOBIC) 15 mg tablet Take 15 mg by mouth daily.  olmesartan (BENICAR) 40 mg tablet Take  by mouth daily.  hydroCHLOROthiazide (HYDRODIURIL) 25 mg tablet Take 1 Tab by mouth daily. (Patient taking differently: Take 12.5 mg by mouth daily.) 30 Tab 6    aspirin delayed-release 81 mg tablet Take 1 Tab by mouth daily. 30 Tab 2    multivitamin (ONE A DAY) tablet Take 1 Tab by mouth daily.       fluticasone (FLONASE) 50 mcg/actuation nasal spray 1 [place] by Both Nostrils route two (2) times a day.  pioglitazone (ACTOS) 30 mg tablet Take 30 mg by mouth daily.  glimepiride (AMARYL) 1 mg tablet Take 2 mg by mouth every morning. Indications: type 2 diabetes mellitus      omeprazole (PRILOSEC) 20 mg capsule Take 20 mg by mouth daily.  METFORMIN HCL (METFORMIN PO) Take 500 mg by mouth two (2) times a day. Clinical Impression     Clinical Impression:   1. Rib pain on left side        Disposition: dc      DISCHARGE NOTE:   Pt has been reexamined. Patient has no new complaints, changes, or physical findings. Care plan outlined and precautions discussed. Results were reviewed with the patient. All medications were reviewed with the patient; will d/c home with PMD f/u. All of pt's questions and concerns were addressed. Patient was instructed and agrees to follow up with PMD, as well as to return to the ED upon further deterioration. Patient is ready to go home. This note was dictated utilizing voice recognition software which may lead to typographical errors. I apologize in advance if the situation occurs. If questions arise please do not hesitate to contact me or call our department. This note was dictated utilizing voice recognition software which may lead to typographical errors. I apologize in advance if the situation occurs. If questions arise please do not hesitate to contact me or call our department.     Sukumar Mckeon MD  9:48 AM

## 2021-02-05 NOTE — ED TRIAGE NOTES
Patient states experiencing trip and fall over vacuum  one week ago Saturday. He denies LOC or striking head during fall. He c/o left rib pain that has been worsening since fall. States left knee pain that has improved since fall. Denies shortness of breath.

## 2021-02-17 ENCOUNTER — OFFICE VISIT (OUTPATIENT)
Dept: CARDIOLOGY CLINIC | Age: 76
End: 2021-02-17
Payer: MEDICARE

## 2021-02-17 VITALS
OXYGEN SATURATION: 98 % | HEART RATE: 64 BPM | HEIGHT: 71 IN | BODY MASS INDEX: 29.26 KG/M2 | WEIGHT: 209 LBS | DIASTOLIC BLOOD PRESSURE: 72 MMHG | SYSTOLIC BLOOD PRESSURE: 140 MMHG

## 2021-02-17 DIAGNOSIS — R55 SYNCOPE AND COLLAPSE: ICD-10-CM

## 2021-02-17 DIAGNOSIS — I25.84 CORONARY ATHEROSCLEROSIS DUE TO SEVERELY CALCIFIED CORONARY LESION: Primary | ICD-10-CM

## 2021-02-17 DIAGNOSIS — Z95.1 S/P CABG X 5: ICD-10-CM

## 2021-02-17 DIAGNOSIS — I10 ESSENTIAL HYPERTENSION: ICD-10-CM

## 2021-02-17 PROCEDURE — G8753 SYS BP > OR = 140: HCPCS | Performed by: INTERNAL MEDICINE

## 2021-02-17 PROCEDURE — 99214 OFFICE O/P EST MOD 30 MIN: CPT | Performed by: INTERNAL MEDICINE

## 2021-02-17 PROCEDURE — G8536 NO DOC ELDER MAL SCRN: HCPCS | Performed by: INTERNAL MEDICINE

## 2021-02-17 PROCEDURE — G8754 DIAS BP LESS 90: HCPCS | Performed by: INTERNAL MEDICINE

## 2021-02-17 PROCEDURE — 3017F COLORECTAL CA SCREEN DOC REV: CPT | Performed by: INTERNAL MEDICINE

## 2021-02-17 PROCEDURE — G8427 DOCREV CUR MEDS BY ELIG CLIN: HCPCS | Performed by: INTERNAL MEDICINE

## 2021-02-17 PROCEDURE — G8419 CALC BMI OUT NRM PARAM NOF/U: HCPCS | Performed by: INTERNAL MEDICINE

## 2021-02-17 PROCEDURE — 1101F PT FALLS ASSESS-DOCD LE1/YR: CPT | Performed by: INTERNAL MEDICINE

## 2021-02-17 PROCEDURE — G8432 DEP SCR NOT DOC, RNG: HCPCS | Performed by: INTERNAL MEDICINE

## 2021-02-17 PROCEDURE — 93000 ELECTROCARDIOGRAM COMPLETE: CPT | Performed by: INTERNAL MEDICINE

## 2021-02-17 RX ORDER — METOPROLOL SUCCINATE 50 MG/1
50 TABLET, EXTENDED RELEASE ORAL DAILY
Qty: 30 TAB | Refills: 5 | Status: SHIPPED | OUTPATIENT
Start: 2021-02-17 | End: 2021-08-11

## 2021-02-17 NOTE — PROGRESS NOTES
[address] Chief Complaint   Patient presents with    Follow-up     1 year follow up - no cardiac complaints    f/u CAD, NSTEMI, s/p CABG    HPI    [address] Arias. is a 70 y.o. gentleman here for follow up of NSTEMI and CAD. As you know patient was found to have an NSTEMI at [facility] ER complaining of persistent chest pain despite a negative nuclear stress test in December 2018. Due to his high pretest probability for ACS we went straight to cath 2/4/ 2019 which revealed MV CAD (see report below) and had subsequently underwent CABG. His EF remained normal 60%. He progressed remarkably well with cardiac rehab. I do note in others documentation that his SBP has been creeping up since hospital discharge. His BB was increased significantly and more recently ARB doubled, and still he can see SBPs 150s-170s. He thinks this is not always right as there has been a 20 point difference btwn manual and automated readings. Regardless he feels great and has no exertional symptoms. Still feels some mild soreness at the incision site. After his last routine visit I tried to optimize his GDMT by increasing his statin to HI (20 to 40) and added HCTZ for better SBP control. He says once he started the HCTZ he started feeling dizzy all the time. He saw his PCP for this, who cut it down to 15 mg and dizziness is significantly improved almost gone but he has always felt dizzy when bending over/ squatting. We checked orthostatics today (see below). He wants to know does he truly \"need\" the Metoprolol as well as higher dose Lipitor. Says SBP was lower- even 100s at times (but today 140s). Cath 2/4/19 Chery:  · Culprit is mid RPDA 95% stenosis with MAME II flow. · Mid to distal left main 65-70% stenosis. · Mid LAD calcified 85% stenosis. · Overall normal left ventricular systolic function. He had an episode of syncope.  He got up at night to urinate, and while standing felt suddenly nauseated and next thing he knew he had fallen into the bathtub. +LOC, no bowel or bladder incontinence. His wife had been in bed but woke up to the sound of him falling. They didn't seek help and she mentioned it to their mutual PCP and \"ratted him out. \" He says if she didn't tell on him- he wasn't planning on mentioning it. This has happened before- maybe 17 yrs ago when hiking and he says he underwent extensive testing- including tilt table etc and everything was benign. Since I last saw him, no complaints. Goes on treadmill fast pace, no CP. Can notice sporadic SOB/ SIMONS, no LE edema. He can feel sudden onset dizziness when bending over/ squatting. Past Medical History:   Diagnosis Date    Arthritis     Diabetes ([address]     Hypertension        Past Surgical History:   Procedure Laterality Date    HX HEENT      tumor removed from neck    HX ORTHOPAEDIC      elbow     NEUROLOGICAL PROCEDURE UNLISTED      back surgery x2    NM LAP,CHOLECYSTECTOMY  11/12/15    Dr. Irasema Brooks       Current Outpatient Medications   Medication Sig Dispense Refill    dulaglutide (TRULICITY SC) by SubCUTAneous route.  atorvastatin (LIPITOR) 40 mg tablet Take 1 tablet by mouth nightly 30 Tab 6    metoprolol succinate (TOPROL-XL) 100 mg tablet Take 1 Tab by mouth daily. 90 Tab 3    meloxicam (MOBIC) 15 mg tablet Take 15 mg by mouth daily.  olmesartan (BENICAR) 40 mg tablet Take  by mouth daily.  hydroCHLOROthiazide (HYDRODIURIL) 25 mg tablet Take 1 Tab by mouth daily. (Patient taking differently: Take 12.5 mg by mouth daily.) 30 Tab 6    aspirin delayed-release 81 mg tablet Take 1 Tab by mouth daily. 30 Tab 2    multivitamin (ONE A DAY) tablet Take 1 Tab by mouth daily.  fluticasone (FLONASE) 50 mcg/actuation nasal spray 1 [place] by Both Nostrils route two (2) times a day.  pioglitazone (ACTOS) 30 mg tablet Take 30 mg by mouth daily.  glimepiride (AMARYL) 1 mg tablet Take 2 mg by mouth every morning.  Indications: type 2 diabetes mellitus      omeprazole (PRILOSEC) 20 mg capsule Take 20 mg by mouth daily.  METFORMIN HCL (METFORMIN PO) Take 500 mg by mouth two (2) times a day.          Allergies   Allergen Reactions    Augmentin [Amoxicillin-Pot Clavulanate] Other (comments)     Turns his tongue black       Social History     Socioeconomic History    Marital status:      Spouse name: Kevin Schwartz Number of children: 2    Years of education: 15    Highest education level: Associate degree: occupational, technical, or vocational program   Occupational History    Not on file   Social Needs    Financial resource strain: Not hard at all   [employer] insecurity     Worry: Never true     Inability: Never true   [email or web address] needs     Medical: No     Non-medical: No   Tobacco Use    Smoking status: Former Smoker     Packs/day: 3.00     Years: 33.00     Pack years: 99.00     Quit date: 1990     Years since quittin.1    Smokeless tobacco: Former User   Substance and Sexual Activity    Alcohol use: Yes     Comment: rarely    Drug use: No    Sexual activity: Yes     Partners: Female   Lifestyle    Physical activity     Days per week: 3 days     Minutes per session: 120 min    Stress: Not at all   Relationships    Social connections     Talks on phone: More than three times a week     Gets together: More than three times a week     Attends Christianity service: More than 4 times per year     Active member of club or organization: No     Attends meetings of clubs or organizations: Never     Relationship status:     Intimate partner violence     Fear of current or ex partner: No     Emotionally abused: No     Physically abused: No     Forced sexual activity: No   Other Topics Concern     Service Yes    Blood Transfusions No    Caffeine Concern Yes    Occupational Exposure Yes    Hobby Hazards No    Sleep Concern No    Stress Concern No    Weight Concern Yes    Special Diet No    Back Care No  Exercise Yes    Bike Helmet No    Seat Belt Yes    Self-Exams Yes   Social History Narrative    Not on file        Review of Systems    14 pt Review of Systems is negative unless otherwise mentioned in the HPI. Wt Readings from Last 3 Encounters:   02/17/21 94.8 kg (209 lb)   02/05/21 91.6 kg (202 lb)   02/05/20 98 kg (216 lb)     Temp Readings from Last 3 Encounters:   02/05/21 98.2 °F (36.8 °C)   03/13/19 98.7 °F (37.1 °C) (Oral)   02/20/19 97.9 °F (36.6 °C) (Oral)     BP Readings from Last 3 Encounters:   02/17/21 (!) 140/72   02/05/21 (!) 157/81   02/05/20 135/85     Pulse Readings from Last 3 Encounters:   02/17/21 64   02/05/21 70   02/05/20 70       02/03/19   ECHO ADULT COMPLETE 02/03/2019 2/3/2019    Narrative · Calculated left ventricular ejection fraction is 60%. Normal left   ventricular wall motion, no regional wall motion abnormality noted. · No significant valvular pathology or effusion, no prior study for   comparison. Signed by: Lorraine Scales,        Physical Exam:    Visit Vitals  BP (!) 140/72 (BP 1 Location: Left upper arm, BP Patient Position: Sitting, BP Cuff Size: Adult)   Pulse 64   Ht 5' 11\" (1.803 m)   Wt 94.8 kg (209 lb)   SpO2 98%   BMI 29.15 kg/m²      Physical Exam  Constitutional:       Appearance: He is well-developed. HENT:      Head: Normocephalic and atraumatic. Eyes:      General: No scleral icterus. Pupils: Pupils are equal, round, and reactive to light. Cardiovascular:      Rate and Rhythm: Normal rate and regular rhythm. Heart sounds: Normal heart sounds. No murmur. No friction rub. No gallop. Comments: Well healing c/d/i sternotomy noted  Pulmonary:      Effort: Pulmonary effort is normal. No respiratory distress. Breath sounds: Normal breath sounds. No wheezing or rales. Chest:      Chest wall: No tenderness. Abdominal:      General: Bowel sounds are normal.      Palpations: Abdomen is soft.    Skin:     General: Skin is warm and dry. Findings: No rash. Neurological:      Mental Status: He is alert and oriented to person, place, and time. EKG last: NSR, normal axis and intervals, no ST segment abnormalities    Lab Results   Component Value Date/Time    Hemoglobin A1c 7.0 (H) 02/03/2019 01:42 AM     Lab Results   Component Value Date/Time    Cholesterol, total 100 02/04/2019 02:15 AM    HDL Cholesterol 50 02/04/2019 02:15 AM    LDL, calculated 33.8 02/04/2019 02:15 AM    VLDL, calculated 16.2 02/04/2019 02:15 AM    Triglyceride 81 02/04/2019 02:15 AM    CHOL/HDL Ratio 2.0 02/04/2019 02:15 AM       Impression and Plan:  Virgen Camara. is a 70 y.o. with:    1.) S/p NSTEMI with CABG 2/8/19  2.) Normal LV function  3.) HTN, well controlled  4.) DM2, known  5.) +FH  6.) Former tobacco  7.) h/O Syncopal episode, suspect vasovagal    1.) Stop Metoprolol 100 today, will refill to 50 mg daily,   2.) SBP goal is <150 so if at goal, will consider stopping at next appt  3.) Otherwise recommend HI statin and ASA lifelong  4.) RTC yearly routine follow up CAD, pt doing well    Thank you for allowing me to participate in the care of your patient, please do not hesitate to call with questions or concerns. Follow-up and Dispositions    · Return in about 1 year (around 2/17/2022).      Kindest Regards,    Sara Angulo, DO

## 2021-02-17 NOTE — PROGRESS NOTES
Milvia Varelarupinder. presents today for   Chief Complaint   Patient presents with    Follow-up     1 year follow up        Milvia Weldon. preferred language for health care discussion is english/other. Is someone accompanying this pt? no    Is the patient using any DME equipment during [address]? no    Depression Screening:  3 most recent PHQ Screens 2/5/2020   Little interest or pleasure in doing things Not at all   Feeling down, depressed, irritable, or hopeless Not at all   Total Score PHQ 2 0   Trouble falling or staying asleep, or sleeping too much -   Feeling tired or having little energy -   Poor appetite, weight loss, or overeating -   Feeling bad about yourself - or that you are a failure or have let yourself or your family down -   Trouble concentrating on things such as school, work, reading, or watching TV -   Moving or speaking so slowly that other people could have noticed; or the opposite being so fidgety that others notice -   Thoughts of being better off dead, or hurting yourself in some way -   PHQ 9 Score -       Learning Assessment:  Learning Assessment 11/9/2015   PRIMARY LEARNER Patient   PRIMARY LANGUAGE ENGLISH   LEARNER PREFERENCE PRIMARY LISTENING   ANSWERED BY patient   RELATIONSHIP SELF       Abuse Screening:  Abuse Screening Questionnaire 9/4/2019   Do you ever feel afraid of your partner? N   Are you in a relationship with someone who physically or mentally threatens you? N   Is it safe for you to go home? Y       Fall Risk  Fall Risk Assessment, last 12 mths 2/5/2020   Able to walk? Yes   Fall in past 12 months? No       Pt currently taking Anticoagulant therapy? ASA 81mg every day     Coordination of Care:  1. Have you been to the ER, urgent care clinic since your last visit? Hospitalized since your last visit? no    2. Have you seen or consulted any other health care providers outside of the [address] [place] [address] since your last visit? Include any pap smears or colon screening. no

## 2021-08-06 RX ORDER — ATORVASTATIN CALCIUM 40 MG/1
TABLET, FILM COATED ORAL
Qty: 90 TABLET | Refills: 3 | Status: SHIPPED | OUTPATIENT
Start: 2021-08-06 | End: 2022-08-01

## 2021-08-11 RX ORDER — METOPROLOL SUCCINATE 50 MG/1
TABLET, EXTENDED RELEASE ORAL
Qty: 30 TABLET | Refills: 5 | Status: SHIPPED | OUTPATIENT
Start: 2021-08-11 | End: 2022-02-02

## 2022-02-02 RX ORDER — METOPROLOL SUCCINATE 50 MG/1
TABLET, EXTENDED RELEASE ORAL
Qty: 30 TABLET | Refills: 0 | Status: SHIPPED | OUTPATIENT
Start: 2022-02-02

## 2022-02-16 ENCOUNTER — OFFICE VISIT (OUTPATIENT)
Dept: CARDIOLOGY CLINIC | Age: 77
End: 2022-02-16
Payer: MEDICARE

## 2022-02-16 VITALS
WEIGHT: 196 LBS | OXYGEN SATURATION: 98 % | SYSTOLIC BLOOD PRESSURE: 132 MMHG | DIASTOLIC BLOOD PRESSURE: 72 MMHG | HEIGHT: 71 IN | BODY MASS INDEX: 27.44 KG/M2 | HEART RATE: 74 BPM

## 2022-02-16 DIAGNOSIS — R55 SYNCOPE AND COLLAPSE: ICD-10-CM

## 2022-02-16 DIAGNOSIS — I21.4 NSTEMI (NON-ST ELEVATED MYOCARDIAL INFARCTION) (HCC): Primary | ICD-10-CM

## 2022-02-16 PROCEDURE — G8427 DOCREV CUR MEDS BY ELIG CLIN: HCPCS | Performed by: INTERNAL MEDICINE

## 2022-02-16 PROCEDURE — G8419 CALC BMI OUT NRM PARAM NOF/U: HCPCS | Performed by: INTERNAL MEDICINE

## 2022-02-16 PROCEDURE — G8510 SCR DEP NEG, NO PLAN REQD: HCPCS | Performed by: INTERNAL MEDICINE

## 2022-02-16 PROCEDURE — 93000 ELECTROCARDIOGRAM COMPLETE: CPT | Performed by: INTERNAL MEDICINE

## 2022-02-16 PROCEDURE — 3017F COLORECTAL CA SCREEN DOC REV: CPT | Performed by: INTERNAL MEDICINE

## 2022-02-16 PROCEDURE — 99215 OFFICE O/P EST HI 40 MIN: CPT | Performed by: INTERNAL MEDICINE

## 2022-02-16 PROCEDURE — 1101F PT FALLS ASSESS-DOCD LE1/YR: CPT | Performed by: INTERNAL MEDICINE

## 2022-02-16 PROCEDURE — G8536 NO DOC ELDER MAL SCRN: HCPCS | Performed by: INTERNAL MEDICINE

## 2022-02-16 NOTE — PATIENT INSTRUCTIONS
If you have not heard from the central scheduler to schedule your testing in 48 hours, please call [phone].         Hold metoprolol

## 2022-02-16 NOTE — PROGRESS NOTES
[address] [facility] Chief Complaint   Patient presents with    Follow-up     1 year follow up    f/u CAD, NSTEMI, s/p CABG    HPI    [address] Arias. is a 78 y.o. gentleman here for follow up of NSTEMI and CAD. As you know patient was found to have an NSTEMI at [facility] ER complaining of persistent chest pain despite a negative nuclear stress test in December 2018. Due to his high pretest probability for ACS we went straight to cath 2/4/ 2019 which revealed MV CAD (see report below) and had subsequently underwent CABG. His EF remained normal 60%. He progressed remarkably well with cardiac rehab. I do note in others documentation that his SBP has been creeping up since hospital discharge. His BB was increased significantly and more recently ARB doubled, and still he can see SBPs 150s-170s. He thinks this is not always right as there has been a 20 point difference btwn manual and automated readings. Regardless he feels great and has no exertional symptoms. Still feels some mild soreness at the incision site. After his last routine visit I tried to optimize his GDMT by increasing his statin to HI (20 to 40) and added HCTZ for better SBP control. He says once he started the HCTZ he started feeling dizzy all the time. He saw his PCP for this, who cut it down to 15 mg and dizziness is significantly improved almost gone but he has always felt dizzy when bending over/ squatting. We checked orthostatics today (see below). He wants to know does he truly \"need\" the Metoprolol as well as higher dose Lipitor. Says SBP was lower- even 100s at times (but today 140s). Cath 2/4/19 Chery:  · Culprit is mid RPDA 95% stenosis with MAME II flow. · Mid to distal left main 65-70% stenosis. · Mid LAD calcified 85% stenosis. · Overall normal left ventricular systolic function. He had an episode of syncope.  He got up at night to urinate, and while standing felt suddenly nauseated and next thing he knew he had fallen into the bathtub. +LOC, no bowel or bladder incontinence. His wife had been in bed but woke up to the sound of him falling. They didn't seek help and she mentioned it to their mutual PCP and \"ratted him out. \" He says if she didn't tell on him- he wasn't planning on mentioning it. This has happened before- maybe 17 yrs ago when hiking and he says he underwent extensive testing- including tilt table etc and everything was benign. Since I last saw him, he had another syncopal episode, almost the same scenario. Woke up Sunday am, felt nauseated and went to urinate, and +LOC. Admits he doesn't drink water at all usually. No preceding CP etc. He admits wasn't feeling well, even on Monday when saw Dr. Roslyn Leos- tired, chills, tachycardic 115 with low grade fever. Orthostatics per pt in Dr. Hedler Shelby office were negative. Pt took rapid COVID, also negative. Has been cutting Metoprolol further so only 25 mg daily for last few days. Past Medical History:   Diagnosis Date    Arthritis     Diabetes ([facility] [address]     Hypertension        Past Surgical History:   Procedure Laterality Date    HX HEENT      tumor removed from neck    HX ORTHOPAEDIC      elbow     NEUROLOGICAL PROCEDURE UNLISTED      back surgery x2    GA LAP,CHOLECYSTECTOMY  11/12/15    Dr. Bo Hubbard       Current Outpatient Medications   Medication Sig Dispense Refill    metoprolol succinate (TOPROL-XL) 50 mg XL tablet Take 1 tablet by mouth once daily (Patient taking differently: 25 mg.) 30 Tablet 0    atorvastatin (LIPITOR) 40 mg tablet Take 1 tablet by mouth nightly 90 Tablet 3    dulaglutide (Trulicity) 4.5 NL/5.4 mL pnij by SubCUTAneous route.  meloxicam (MOBIC) 15 mg tablet Take 15 mg by mouth daily.  olmesartan (BENICAR) 40 mg tablet Take  by mouth daily.  aspirin delayed-release 81 mg tablet Take 1 Tab by mouth daily. 30 Tab 2    multivitamin (ONE A DAY) tablet Take 1 Tab by mouth daily.       fluticasone (FLONASE) 50 mcg/actuation nasal spray 1 [place] by Both Nostrils route two (2) times a day.  pioglitazone (ACTOS) 30 mg tablet Take 30 mg by mouth daily.  glimepiride (AMARYL) 1 mg tablet Take 2 mg by mouth every morning. Indications: type 2 diabetes mellitus      omeprazole (PRILOSEC) 20 mg capsule Take 20 mg by mouth daily.  METFORMIN HCL (METFORMIN PO) Take 500 mg by mouth two (2) times a day.  hydroCHLOROthiazide (HYDRODIURIL) 25 mg tablet Take 1 Tab by mouth daily. (Patient not taking: Reported on 2022) 30 Tab 6       Allergies   Allergen Reactions    Augmentin [Amoxicillin-Pot Clavulanate] Other (comments)     Turns his tongue black       Social History     Socioeconomic History    Marital status:      Spouse name: Anoop Rivera Number of children: 2    Years of education: 15    Highest education level: Associate degree: occupational, technical, or vocational program   Occupational History    Not on file   Tobacco Use    Smoking status: Former Smoker     Packs/day: 3.00     Years: 33.00     Pack years: 99.00     Quit date: 1990     Years since quittin.1    Smokeless tobacco: Former User   Substance and Sexual Activity    Alcohol use: Yes     Comment: rarely    Drug use: No    Sexual activity: Yes     Partners: Female   Other Topics Concern     Service Yes    Blood Transfusions No    Caffeine Concern Yes    Occupational Exposure Yes    Hobby Hazards No    Sleep Concern No    Stress Concern No    Weight Concern Yes    Special Diet No    Back Care No    Exercise Yes    Bike Helmet No    Seat Belt Yes    Self-Exams Yes   Social History Narrative    Not on file     Social Determinants of Health     Financial Resource Strain:     Difficulty of Paying Living Expenses: Not on file   Food Insecurity:     Worried About Running Out of Food in the Last Year: Not on file    Chrissy of Food in the Last Year: Not on file   Transportation Needs:     Lack of Transportation (Medical):  Not on file    Lack of Transportation (Non-Medical): Not on file   Physical Activity:     Days of Exercise per Week: Not on file    Minutes of Exercise per Session: Not on file   Stress:     Feeling of Stress : Not on file   Social Connections:     Frequency of Communication with Friends and Family: Not on file    Frequency of Social Gatherings with Friends and Family: Not on file    Attends Ep[employer] Services: Not on file    Active Member of [address] [place] [address] or Organizations: Not on file    Attends Club or Organization Meetings: Not on file    Marital Status: Not on file   Intimate Partner Violence:     Fear of Current or Ex-Partner: Not on file    Emotionally Abused: Not on file    Physically Abused: Not on file    Sexually Abused: Not on file   Housing Stability:     Unable to Pay for Housing in the Last Year: Not on file    Number of [place] in the Last Year: Not on file    Unstable Housing in the Last Year: Not on file        Review of Systems    14 pt Review of Systems is negative unless otherwise mentioned in the HPI. Wt Readings from Last 3 Encounters:   02/16/22 88.9 kg (196 lb)   02/17/21 94.8 kg (209 lb)   02/05/21 91.6 kg (202 lb)     Temp Readings from Last 3 Encounters:   02/05/21 98.2 °F (36.8 °C)   03/13/19 98.7 °F (37.1 °C) (Oral)   02/20/19 97.9 °F (36.6 °C) (Oral)     BP Readings from Last 3 Encounters:   02/16/22 132/72   02/17/21 (!) 140/72   02/05/21 (!) 157/81     Pulse Readings from Last 3 Encounters:   02/16/22 74   02/17/21 64   02/05/21 70       02/03/19   ECHO ADULT COMPLETE 02/03/2019 2/3/2019    Narrative · Calculated left ventricular ejection fraction is 60%. Normal left   ventricular wall motion, no regional wall motion abnormality noted. · No significant valvular pathology or effusion, no prior study for   comparison.         Signed by: Cami Jimenez DO     02/03/19    CARDIAC PROCEDURE 02/04/2019 2/4/2019    Conclusion  · Culprit is mid RPDA 95% stenosis with MAME II flow.  · Mid to distal left main 65-70% stenosis. · Mid LAD calcified 85% stenosis. · Overall normal left ventricular systolic function. Signed by: Porfirio Hendrickson MD on 2/4/2019  6:47 PM      Physical Exam:    Visit Vitals  /72 (BP 1 Location: Right upper arm, BP Patient Position: Sitting, BP Cuff Size: Adult)   Pulse 74   Ht 5' 11\" (1.803 m)   Wt 88.9 kg (196 lb)   SpO2 98%   BMI 27.34 kg/m²      Physical Exam  Constitutional:       Appearance: He is well-developed. HENT:      Head: Normocephalic and atraumatic. Eyes:      General: No scleral icterus. Pupils: Pupils are equal, round, and reactive to light. Cardiovascular:      Rate and Rhythm: Normal rate and regular rhythm. Heart sounds: Normal heart sounds. No murmur heard. No friction rub. No gallop. Pulmonary:      Effort: Pulmonary effort is normal. No respiratory distress. Breath sounds: Normal breath sounds. No wheezing or rales. Chest:      Chest wall: No tenderness. Abdominal:      General: Bowel sounds are normal.      Palpations: Abdomen is soft. Skin:     General: Skin is warm and dry. Findings: No rash. Neurological:      Mental Status: He is alert and oriented to person, place, and time. EKG: NSR, no ST segment abnormalities, iRBBB, no significant changes    Lab Results   Component Value Date/Time    Hemoglobin A1c 7.0 (H) 02/03/2019 01:42 AM     Lab Results   Component Value Date/Time    Cholesterol, total 100 02/04/2019 02:15 AM    HDL Cholesterol 50 02/04/2019 02:15 AM    LDL, calculated 33.8 02/04/2019 02:15 AM    VLDL, calculated 16.2 02/04/2019 02:15 AM    Triglyceride 81 02/04/2019 02:15 AM    CHOL/HDL Ratio 2.0 02/04/2019 02:15 AM       Impression and Plan:  Sylwia Yañez is a 78 y. o. with:    1.) S/p NSTEMI with mv CAD, CABG 2/8/19  2.) Normal LV function  3.) HTN, well controlled  4.) DM2, known  5.) +FH  6.) Former tobacco  7.) Recurrent syncopal episode, suspect vasovagal    1.) Agree syncope sounds vasovagal/ neurocardiogenic again (also says hadnt been feeling well, low grade fever, sinus tach 110s with chills/ negative COVID test), but with h/o complex CAD, would ensure no change in LV function and perfusion- will schedule for pharm nuc and echo  2.) Can stop Metoprolol and has follow up 3-4 weeks with Dr. Adis Puentes  3.) Otherwise recommend HI statin and ASA lifelong  4.) RTC yearly if testing low risk/ unchanged    45 mins spent, incl reviewing his last cath with him, no repeat ischemic eval since CABG 2019. Can consider repeat MCT but symptoms so sporadic and doesn't sound c/w SSS but asked him to call me if recurrent near syncope etc and will mail him MCT    The most frequent mechanism for reflex syncope is a mixed hemodynamic response, although an individual patient may have syncopal events characterized principally by vasodepressor, cardioinhibitory, or mixed responses. The cardioinhibitory response results from increased parasympathetic activation and may be manifested by sinus bradycardia, CT interval prolongation, advanced atrioventricular block, and/or asystole. The vasodepressor response is due to decreased sympathetic activity and can lead to symptomatic hypotension even in the absence of severe bradycardia. Education today included etiology and natural history of Neurocardiogenic/ Reflex syncope, and ways to avoid actually losing consciousness such as staying well hydrated and sitting/ or laying down when you feel \"premonition. \"    Thank you for allowing me to participate in the care of your patient, please do not hesitate to call with questions or concerns. Follow-up and Dispositions    · Return in about 1 year (around 2/16/2023).      Kindest Regards,    Rafal Gerardo, DO

## 2022-02-16 NOTE — PROGRESS NOTES
Sincere Romero. presents today for   Chief Complaint   Patient presents with    Follow-up     1 year follow up        Sincere Romero. preferred language for health care discussion is english/other. Is someone accompanying this pt? no    Is the patient using any DME equipment during [address]? no    Depression Screening:  3 most recent PHQ Screens 2/16/2022   Little interest or pleasure in doing things Not at all   Feeling down, depressed, irritable, or hopeless Not at all   Total Score PHQ 2 0   Trouble falling or staying asleep, or sleeping too much -   Feeling tired or having little energy -   Poor appetite, weight loss, or overeating -   Feeling bad about yourself - or that you are a failure or have let yourself or your family down -   Trouble concentrating on things such as school, work, reading, or watching TV -   Moving or speaking so slowly that other people could have noticed; or the opposite being so fidgety that others notice -   Thoughts of being better off dead, or hurting yourself in some way -   PHQ 9 Score -       Learning Assessment:  Learning Assessment 11/9/2015   PRIMARY LEARNER Patient   PRIMARY LANGUAGE ENGLISH   LEARNER PREFERENCE PRIMARY LISTENING   ANSWERED BY patient   RELATIONSHIP SELF       Abuse Screening:  Abuse Screening Questionnaire 2/16/2022   Do you ever feel afraid of your partner? N   Are you in a relationship with someone who physically or mentally threatens you? N   Is it safe for you to go home? Y       Fall Risk  Fall Risk Assessment, last 12 mths 2/16/2022   Able to walk? Yes   Fall in past 12 months? 0   Do you feel unsteady? 0   Are you worried about falling 0       Pt currently taking Anticoagulant therapy? no    Coordination of Care:  1. Have you been to the ER, urgent care clinic since your last visit? Hospitalized since your last visit? no    2. Have you seen or consulted any other health care providers outside of the [address] [place] [address] since your last visit? Include any pap smears or colon screening.  no

## 2022-02-18 ENCOUNTER — TELEPHONE (OUTPATIENT)
Dept: CARDIOLOGY CLINIC | Age: 77
End: 2022-02-18

## 2022-02-24 ENCOUNTER — TELEPHONE (OUTPATIENT)
Dept: CARDIOLOGY CLINIC | Age: 77
End: 2022-02-24

## 2022-02-24 NOTE — TELEPHONE ENCOUNTER
Return message from Dr. Cabrera Primes to discuss mutual pt  His nuc does not show new perfusion defects  Still waiting on his echo  His symptoms are c/w neurocardiogenic/ vasovagal syncope (which he has had several times in the past, and this time was in setting of low grade fever/ illness)  Will consider MCT in future, already stopped his BB    Thanks, will ask nurse to call pt with nuc results

## 2022-03-02 ENCOUNTER — TELEPHONE (OUTPATIENT)
Dept: CARDIOLOGY CLINIC | Age: 77
End: 2022-03-02

## 2022-03-02 NOTE — TELEPHONE ENCOUNTER
----- Message from Adis Larios DO sent at 2/23/2022  9:41 PM EST -----  Regarding: normal perfusion nuc  There are no new perfusion defects, can just see some scar from his prior event  No further changes, but glad we checked (likely just having vasovagal syncope)  Thanks  ----- Message -----  From: Shreyas Almaraz RN  Sent: 2/22/2022  10:08 AM EST  To: Adis Larios DO    Per your last note \"   1.) S/p NSTEMI with mv CAD, CABG 2/8/19  2.) Normal LV function  3.) HTN, well controlled  4.) DM2, known  5.) +FH  6.) Former tobacco  7.) Recurrent syncopal episode, suspect vasovagal     1.) Agree syncope sounds vasovagal/ neurocardiogenic again (also says hadnt been feeling well, low grade fever, sinus tach 110s with chills/ negative COVID test), but with h/o complex CAD, would ensure no change in LV function and perfusion- will schedule for pharm nuc and echo  2.) Can stop Metoprolol and has follow up 3-4 weeks with Dr. Staci Plummer  3.) Otherwise recommend HI statin and ASA lifelong  4.) RTC yearly if testing low risk/ unchanged

## 2022-03-11 ENCOUNTER — HOSPITAL ENCOUNTER (OUTPATIENT)
Dept: NON INVASIVE DIAGNOSTICS | Age: 77
Discharge: HOME | End: 2022-03-11
Attending: INTERNAL MEDICINE
Payer: MEDICARE

## 2022-03-11 VITALS
BODY MASS INDEX: 27.44 KG/M2 | SYSTOLIC BLOOD PRESSURE: 142 MMHG | HEIGHT: 71 IN | DIASTOLIC BLOOD PRESSURE: 80 MMHG | WEIGHT: 196 LBS

## 2022-03-11 DIAGNOSIS — I21.4 NSTEMI (NON-ST ELEVATED MYOCARDIAL INFARCTION) (HCC): ICD-10-CM

## 2022-03-11 DIAGNOSIS — R55 SYNCOPE AND COLLAPSE: ICD-10-CM

## 2022-03-11 LAB
ECHO AO ROOT DIAM: 3.9 CM
ECHO AO ROOT INDEX: 1.87 CM/M2
ECHO LA VOL 2C: 51 ML (ref 18–58)
ECHO LA VOL 4C: 52 ML (ref 18–58)
ECHO LA VOLUME AREA LENGTH: 55 ML
ECHO LA VOLUME INDEX A2C: 24 ML/M2 (ref 16–34)
ECHO LA VOLUME INDEX A4C: 25 ML/M2 (ref 16–34)
ECHO LA VOLUME INDEX AREA LENGTH: 26 ML/M2 (ref 16–34)
ECHO LV E' LATERAL VELOCITY: 10 CM/S
ECHO LV FRACTIONAL SHORTENING: 38 % (ref 28–44)
ECHO LV INTERNAL DIMENSION DIASTOLE INDEX: 2.15 CM/M2
ECHO LV INTERNAL DIMENSION DIASTOLIC: 4.5 CM (ref 4.2–5.9)
ECHO LV INTERNAL DIMENSION SYSTOLIC INDEX: 1.34 CM/M2
ECHO LV INTERNAL DIMENSION SYSTOLIC: 2.8 CM
ECHO LV IVSD: 1.2 CM (ref 0.6–1)
ECHO LV MASS 2D: 186.4 G (ref 88–224)
ECHO LV MASS INDEX 2D: 89.2 G/M2 (ref 49–115)
ECHO LV POSTERIOR WALL DIASTOLIC: 1.1 CM (ref 0.6–1)
ECHO LV RELATIVE WALL THICKNESS RATIO: 0.49
ECHO LVOT AREA: 3.5 CM2
ECHO LVOT DIAM: 2.1 CM
ECHO LVOT MEAN GRADIENT: 2 MMHG
ECHO LVOT PEAK GRADIENT: 4 MMHG
ECHO LVOT PEAK VELOCITY: 1 M/S
ECHO LVOT STROKE VOLUME INDEX: 31 ML/M2
ECHO LVOT SV: 64.7 ML
ECHO LVOT VTI: 18.7 CM
ECHO MV A VELOCITY: 0.73 M/S
ECHO MV E DECELERATION TIME (DT): 254 MS
ECHO MV E VELOCITY: 0.62 M/S
ECHO MV E/A RATIO: 0.85
ECHO MV E/E' LATERAL: 6.2

## 2022-03-11 PROCEDURE — 93306 TTE W/DOPPLER COMPLETE: CPT

## 2022-03-14 NOTE — PROGRESS NOTES
Per your last note   \"      1.) S/p NSTEMI with mv CAD, CABG 2/8/19  2.) Normal LV function  3.) HTN, well controlled  4.) DM2, known  5.) +FH  6.) Former tobacco  7.) Recurrent syncopal episode, suspect vasovagal     1.) Agree syncope sounds vasovagal/ neurocardiogenic again (also says hadnt been feeling well, low grade fever, sinus tach 110s with chills/ negative COVID test), but with h/o complex CAD, would ensure no change in LV function and perfusion- will schedule for pharm nuc and echo  2.) Can stop Metoprolol and has follow up 3-4 weeks with Dr. Rika Mcconnell  3.) Otherwise recommend HI statin and ASA lifelong  4.) RTC yearly if testing low risk/ unchanged     45 mins spent, incl reviewing his last cath with him, no repeat ischemic eval since CABG 2019.  Can consider repeat MCT but symptoms so sporadic and doesn't sound c/w SSS but asked him to call me if recurrent near syncope etc and will mail him MCT

## 2022-03-18 PROBLEM — R07.9 CHEST PAIN: Status: ACTIVE | Noted: 2019-02-03

## 2022-03-19 PROBLEM — Z95.1 S/P CABG X 5: Status: ACTIVE | Noted: 2019-02-11

## 2022-03-19 PROBLEM — I21.4 NSTEMI (NON-ST ELEVATED MYOCARDIAL INFARCTION) (HCC): Status: ACTIVE | Noted: 2019-02-06

## 2022-03-22 ENCOUNTER — TELEPHONE (OUTPATIENT)
Dept: CARDIOLOGY CLINIC | Age: 77
End: 2022-03-22

## 2022-03-22 NOTE — TELEPHONE ENCOUNTER
----- Message from Herber Gonzalez DO sent at 3/16/2022  1:22 PM EDT -----  Echo is normal and unchanged  Hope he is feeling better  (suspect neurocardiogenic syncope/ vasovagal episodes, and BB was held)  ----- Message -----  From: Adebayo Moss RN  Sent: 3/14/2022   7:11 AM EDT  To: Herber Gonzalez, DO    Per your last note   \"      1.) S/p NSTEMI with mv CAD, CABG 2/8/19  2.) Normal LV function  3.) HTN, well controlled  4.) DM2, known  5.) +FH  6.) Former tobacco  7.) Recurrent syncopal episode, suspect vasovagal     1.) Agree syncope sounds vasovagal/ neurocardiogenic again (also says hadnt been feeling well, low grade fever, sinus tach 110s with chills/ negative COVID test), but with h/o complex CAD, would ensure no change in LV function and perfusion- will schedule for pharm nuc and echo  2.) Can stop Metoprolol and has follow up 3-4 weeks with Dr. Margaret Mckinnon  3.) Otherwise recommend HI statin and ASA lifelong  4.) RTC yearly if testing low risk/ unchanged     45 mins spent, incl reviewing his last cath with him, no repeat ischemic eval since CABG 2019.  Can consider repeat MCT but symptoms so sporadic and doesn't sound c/w SSS but asked him to call me if recurrent near syncope etc and will mail him MCT

## 2022-03-22 NOTE — TELEPHONE ENCOUNTER
Patient aware of results   Would like Dr Helder Hennessy to know that yes he feels so much better off the BB. Has more energy, feeling great.

## 2022-08-01 RX ORDER — ATORVASTATIN CALCIUM 40 MG/1
TABLET, FILM COATED ORAL
Qty: 90 TABLET | Refills: 3 | Status: SHIPPED | OUTPATIENT
Start: 2022-08-01

## 2023-02-02 ENCOUNTER — OFFICE VISIT (OUTPATIENT)
Dept: ORTHOPEDIC SURGERY | Age: 78
End: 2023-02-02
Payer: MEDICARE

## 2023-02-02 VITALS — BODY MASS INDEX: 27.24 KG/M2 | WEIGHT: 194.6 LBS | TEMPERATURE: 97.3 F | HEIGHT: 71 IN

## 2023-02-02 DIAGNOSIS — M65.322 TRIGGER INDEX FINGER OF LEFT HAND: ICD-10-CM

## 2023-02-02 DIAGNOSIS — M65.332 TRIGGER MIDDLE FINGER OF LEFT HAND: Primary | ICD-10-CM

## 2023-02-02 NOTE — PROGRESS NOTES
Yanet Brown is a 78 y.o. male right handed retiree. Worker's Compensation and legal considerations: none filed. Vitals:    02/02/23 1436   Temp: 97.3 °F (36.3 °C)   TempSrc: Temporal   Weight: 194 lb 9.6 oz (88.3 kg)   Height: 5' 11\" (1.803 m)   PainSc:   4   PainLoc: Finger           Chief Complaint   Patient presents with    Finger Pain     Lt index & lt middle finger          HPI: Patient presents today with complaints of left index and middle finger pain and locking. Date of onset: Chronic for the middle finger late 2022 for the index finger    Injury: No    Prior Treatment:  No    Numbness/ Tingling: No      ROS: Review of Systems - General ROS: negative  Psychological ROS: negative  ENT ROS: negative  Allergy and Immunology ROS: negative  Hematological and Lymphatic ROS: negative  Respiratory ROS: no cough, shortness of breath, or wheezing  Cardiovascular ROS: no chest pain or dyspnea on exertion  Gastrointestinal ROS: no abdominal pain, change in bowel habits, or black or bloody stools  Musculoskeletal ROS: negative  Neurological ROS: negative  Dermatological ROS: negative    Past Medical History:   Diagnosis Date    Arthritis     Diabetes ([facility] [address]     Hypertension        Past Surgical History:   Procedure Laterality Date    HX HEENT      tumor removed from neck    HX ORTHOPAEDIC      elbow     NEUROLOGICAL PROCEDURE UNLISTED      back surgery x2    LA LAP,CHOLECYSTECTOMY  11/12/15    Dr. Christiano Rubin       Current Outpatient Medications   Medication Sig Dispense Refill    atorvastatin (LIPITOR) 40 mg tablet Take 1 tablet by mouth nightly 90 Tablet 3    metoprolol succinate (TOPROL-XL) 50 mg XL tablet Take 1 tablet by mouth once daily (Patient taking differently: 25 mg.) 30 Tablet 0    dulaglutide (Trulicity) 4.5 RV/5.1 mL pnij by SubCUTAneous route.  meloxicam (MOBIC) 15 mg tablet Take 15 mg by mouth daily.  olmesartan (BENICAR) 40 mg tablet Take  by mouth daily.       hydroCHLOROthiazide (HYDRODIURIL) 25 mg tablet Take 1 Tab by mouth daily. (Patient not taking: Reported on 2/16/2022) 30 Tab 6    aspirin delayed-release 81 mg tablet Take 1 Tab by mouth daily. 30 Tab 2    multivitamin (ONE A DAY) tablet Take 1 Tab by mouth daily.  fluticasone (FLONASE) 50 mcg/actuation nasal spray 1 [place] by Both Nostrils route two (2) times a day.  pioglitazone (ACTOS) 30 mg tablet Take 30 mg by mouth daily.  glimepiride (AMARYL) 1 mg tablet Take 2 mg by mouth every morning. Indications: type 2 diabetes mellitus      omeprazole (PRILOSEC) 20 mg capsule Take 20 mg by mouth daily.  METFORMIN HCL (METFORMIN PO) Take 500 mg by mouth two (2) times a day. Current Facility-Administered Medications   Medication Dose Route Frequency Provider Last Rate Last Admin    triamcinolone acetonide (KENALOG) 10 mg/mL injection 10 mg  10 mg Other ONCE Madi Duran, DO           Allergies   Allergen Reactions    Augmentin [Amoxicillin-Pot Clavulanate] Other (comments)     Turns his tongue black           PE:     Physical Exam  Vitals and nursing note reviewed. Constitutional:       General: He is not in acute distress. Appearance: Normal appearance. He is not ill-appearing. Cardiovascular:      Pulses: Normal pulses. Pulmonary:      Effort: Pulmonary effort is normal. No respiratory distress. Musculoskeletal:         General: Tenderness present. No swelling, deformity or signs of injury. Normal range of motion. Cervical back: Normal range of motion and neck supple. Right lower leg: No edema. Left lower leg: No edema. Skin:     General: Skin is warm and dry. Capillary Refill: Capillary refill takes less than 2 seconds. Findings: No bruising or erythema. Neurological:      General: No focal deficit present. Mental Status: He is alert and oriented to person, place, and time.    Psychiatric:         Mood and Affect: Mood normal. Behavior: Behavior normal.          Hand:    Examination L Digit(s) R Digit(s)   1st CMC Tenderness -  -    1st CMC Grind -  -    Len Nodes -  -    Heberden Nodes -  -    A1 Pulley Tenderness + IF/LF -    Triggering +  -    UCL Instability -  -    RCL Instability -  -    Lateral Stress Pain -  -    Palmar Cords -  -    Tabletop test -  -    Garrod's Pads -  -     Strength       Pinch Strength         ROM: Full        Imaging:     None indicated        ICD-10-CM ICD-9-CM    1. Trigger middle finger of left hand  M65.332 727.03 INJECT TENDON SHEATH/LIGAMENT      triamcinolone acetonide (KENALOG) 10 mg/mL injection 10 mg      2. Trigger index finger of left hand  M65.322 727.03 INJECT TENDON SHEATH/LIGAMENT      triamcinolone acetonide (KENALOG) 10 mg/mL injection 10 mg            Plan:     Left index and middle trigger finger injections    Follow-up and Dispositions    Return if symptoms worsen or fail to improve. Plan was reviewed with patient, who verbalized agreement and understanding of the plan    [facility] NOTE        Chart reviewed for the following:   IMadi DO, have reviewed the History, Physical and updated the Allergic reactions for [address] performed immediately prior to start of procedure:   Denys MAURICIO DO, have performed the following reviews on Gracia Ramírez. prior to the start of the procedure:            * Patient was identified by name and date of birth   * Agreement on procedure being performed was verified  * Risks and Benefits explained to the patient  * Procedure site verified and marked as necessary  * Patient was positioned for comfort  * Consent was signed and verified     Time: 14:48      Date of procedure: 2/2/2023    Procedure performed by:   Denys Rogers DO    Provider assisted by: Ramona Mckeon MA    Patient assisted by: self    How tolerated by patient: tolerated the procedure well with no complications    Post Procedural Pain Scale: 0 - No Hurt    Comments: none    Procedure:  After consent was obtained, using sterile technique the left index and middle fingers was prepped. Local anesthetic used: 1% lidocaine. Kenalog 5 mg X2 and was then injected and the needle withdrawn. The procedure was well tolerated. The patient is asked to continue to rest the area for a few more days before resuming regular activities. It may be more painful for the first 1-2 days. Watch for fever, or increased swelling or persistent pain in the joint. Call or return to clinic prn if such symptoms occur or there is failure to improve as anticipated.

## 2023-02-02 NOTE — LETTER
2/2/2023    Patient: Nitin Paris. YOB: 1945   Date of Visit: 2/2/2023     Neno Campos MD  [address] [id number]  Via Fax: [phone]    Dear Neno Campos MD,      Thank you for referring Mr. Rigoberto Brown to [address] [place] [address] for evaluation. My notes for this consultation are attached. If you have questions, please do not hesitate to call me. I look forward to following your patient along with you.       Sincerely,    Hui Rajan, DO

## 2023-02-15 ENCOUNTER — OFFICE VISIT (OUTPATIENT)
Age: 78
End: 2023-02-15
Payer: MEDICARE

## 2023-02-15 VITALS
WEIGHT: 195 LBS | BODY MASS INDEX: 27.3 KG/M2 | SYSTOLIC BLOOD PRESSURE: 136 MMHG | HEART RATE: 65 BPM | HEIGHT: 71 IN | OXYGEN SATURATION: 99 % | DIASTOLIC BLOOD PRESSURE: 78 MMHG

## 2023-02-15 DIAGNOSIS — I21.4 NON-ST ELEVATION (NSTEMI) MYOCARDIAL INFARCTION (HCC): Primary | ICD-10-CM

## 2023-02-15 DIAGNOSIS — R55 SYNCOPE AND COLLAPSE: ICD-10-CM

## 2023-02-15 PROCEDURE — 99214 OFFICE O/P EST MOD 30 MIN: CPT | Performed by: INTERNAL MEDICINE

## 2023-02-15 PROCEDURE — 1123F ACP DISCUSS/DSCN MKR DOCD: CPT | Performed by: INTERNAL MEDICINE

## 2023-02-15 PROCEDURE — G8428 CUR MEDS NOT DOCUMENT: HCPCS | Performed by: INTERNAL MEDICINE

## 2023-02-15 PROCEDURE — 1036F TOBACCO NON-USER: CPT | Performed by: INTERNAL MEDICINE

## 2023-02-15 PROCEDURE — 3017F COLORECTAL CA SCREEN DOC REV: CPT | Performed by: INTERNAL MEDICINE

## 2023-02-15 PROCEDURE — G8417 CALC BMI ABV UP PARAM F/U: HCPCS | Performed by: INTERNAL MEDICINE

## 2023-02-15 PROCEDURE — 93000 ELECTROCARDIOGRAM COMPLETE: CPT | Performed by: INTERNAL MEDICINE

## 2023-02-15 PROCEDURE — G8484 FLU IMMUNIZE NO ADMIN: HCPCS | Performed by: INTERNAL MEDICINE

## 2023-02-15 RX ORDER — ERGOCALCIFEROL 1.25 MG/1
CAPSULE ORAL DAILY
COMMUNITY

## 2023-02-15 ASSESSMENT — PATIENT HEALTH QUESTIONNAIRE - PHQ9
SUM OF ALL RESPONSES TO PHQ QUESTIONS 1-9: 0
SUM OF ALL RESPONSES TO PHQ QUESTIONS 1-9: 0
2. FEELING DOWN, DEPRESSED OR HOPELESS: 0
SUM OF ALL RESPONSES TO PHQ QUESTIONS 1-9: 0
SUM OF ALL RESPONSES TO PHQ9 QUESTIONS 1 & 2: 0
1. LITTLE INTEREST OR PLEASURE IN DOING THINGS: 0
SUM OF ALL RESPONSES TO PHQ QUESTIONS 1-9: 0

## 2023-02-15 ASSESSMENT — ANXIETY QUESTIONNAIRES
3. WORRYING TOO MUCH ABOUT DIFFERENT THINGS: 0
2. NOT BEING ABLE TO STOP OR CONTROL WORRYING: 0
GAD7 TOTAL SCORE: 0
5. BEING SO RESTLESS THAT IT IS HARD TO SIT STILL: 0
IF YOU CHECKED OFF ANY PROBLEMS ON THIS QUESTIONNAIRE, HOW DIFFICULT HAVE THESE PROBLEMS MADE IT FOR YOU TO DO YOUR WORK, TAKE CARE OF THINGS AT HOME, OR GET ALONG WITH OTHER PEOPLE: NOT DIFFICULT AT ALL
1. FEELING NERVOUS, ANXIOUS, OR ON EDGE: 0
7. FEELING AFRAID AS IF SOMETHING AWFUL MIGHT HAPPEN: 0
6. BECOMING EASILY ANNOYED OR IRRITABLE: 0
4. TROUBLE RELAXING: 0

## 2023-02-15 NOTE — PROGRESS NOTES
Shakila Uribe. presents today for   Chief Complaint   Patient presents with    Follow-up     1 year follow up. Shakila Uribe preferred language for health care discussion is english/other. Is someone accompanying this pt? no    Is the patient using any DME equipment during OV? no    Depression Screening:  Depression: Not at risk    PHQ-2 Score: 0        Learning Assessment:  Who is the primary learner? Patient    What is the preferred language for health care of the primary learner? ENGLISH    How does the primary learner prefer to learn new concepts? DEMONSTRATION    Answered By patient    Relationship to Learner SELF           Pt currently taking Anticoagulant therapy? no    Pt currently taking Antiplatelet therapy ? ASA 81 mg 1x daily       Coordination of Care:  1. Have you been to the ER, urgent care clinic since your last visit? Hospitalized since your last visit? no    2. Have you seen or consulted any other health care providers outside of the [facility] since your last visit? Include any pap smears or colon screening.  no

## 2023-02-15 NOTE — PROGRESS NOTES
[address] Chief Complaint   Patient presents with    Follow-up     1 year follow up. HPI    [address]. is a 78 y.o. gentleman here for follow up of NSTEMI and CAD. As you know patient was found to have an NSTEMI at [facility] ER complaining of persistent chest pain despite a negative nuclear stress test in December 2018. Due to his high pretest probability for ACS we went straight to cath 2/4/ 2019 which revealed MV CAD (see report below) and had subsequently underwent CABG. His EF remained normal 60%. He progressed remarkably well with cardiac rehab. I do note in others documentation that his SBP has been creeping up since hospital discharge. His BB was increased significantly and more recently ARB doubled, and still he can see SBPs 150s-170s. He thinks this is not always right as there has been a 20 point difference btwn manual and automated readings. Regardless he feels great and has no exertional symptoms. Still feels some mild soreness at the incision site. After his last routine visit I tried to optimize his GDMT by increasing his statin to HI (20 to 40) and added HCTZ for better SBP control. He says once he started the HCTZ he started feeling dizzy all the time. He saw his PCP for this, who cut it down to 15 mg and dizziness is significantly improved almost gone but he has always felt dizzy when bending over/ squatting. He wants to know does he truly \"need\" the Metoprolol as well as higher dose Lipitor. Says SBP was lower- even 100s at times (but 140s at his appts). Finally it was felt this was contributing to his syncopal episodes and causing fatigue so was stopped.     Past Medical History:   Diagnosis Date    Arthritis     Diabetes ([facility] [address]     Hypertension        Past Surgical History:   Procedure Laterality Date    HEENT      tumor removed from neck    LAP,CHOLECYSTECTOMY  11/12/15    Dr. Home Asher      back surgery x2    ORTHOPEDIC SURGERY      elbow Current Outpatient Medications   Medication Sig Dispense Refill    metFORMIN (GLUCOPHAGE) 500 MG tablet Take 500 mg by mouth 2 times daily      vitamin D (ERGOCALCIFEROL) 1.25 MG (49530 UT) CAPS capsule Take by mouth daily      Multiple Vitamin (MULTIVITAMIN ADULT PO) Take 1 tablet by mouth daily      aspirin 81 MG EC tablet Take 81 mg by mouth daily      atorvastatin (LIPITOR) 40 MG tablet Take 40 mg by mouth nightly      fluticasone (FLONASE) 50 MCG/ACT nasal spray 1 spray by Nasal route 2 times daily      glimepiride (AMARYL) 2 MG tablet Take 2 mg by mouth every morning      meloxicam (MOBIC) 15 MG tablet Take 15 mg by mouth daily      olmesartan (BENICAR) 40 MG tablet Take 40 mg by mouth daily      OZEMPIC, 1 MG/DOSE, 4 MG/3ML SOPN every 7 days      amLODIPine (NORVASC) 2.5 MG tablet Take 2.5 mg by mouth every morning       No current facility-administered medications for this visit.        Allergies   Allergen Reactions    Amoxicillin-Pot Clavulanate Other (See Comments)     Turns his tongue black  Rash & Hives         Social History     Socioeconomic History    Marital status:      Spouse name: Not on file    Number of children: Not on file    Years of education: 14    Highest education level: Not on file   Occupational History    Not on file   Tobacco Use    Smoking status: Former     Packs/day: 3.00     Types: Cigarettes     Quit date: 1990     Years since quittin.1    Smokeless tobacco: Former   Vaping Use    Vaping Use: Not on file   Substance and Sexual Activity    Alcohol use: Yes    Drug use: No    Sexual activity: Not on file   Other Topics Concern    Not on file   Social History Narrative    Not on file     Social Determinants of Health     Financial Resource Strain: Not on file   Food Insecurity: Not on file   Transportation Needs: Not on file   Physical Activity: Unknown    Days of Exercise per Week: 5 days    Minutes of Exercise per Session: Not on file   Stress: Not on file Social Connections: Not on file   Intimate Partner Violence: Unknown    Fear of Current or Ex-Partner: Patient refused    Emotionally Abused: Patient refused    Physically Abused: Patient refused    Sexually Abused: Patient refused   Housing Stability: Not on file        FH+    Review of Systems    14 pt Review of Systems is negative unless otherwise mentioned in the HPI. Wt Readings from Last 3 Encounters:   02/15/23 195 lb (88.5 kg)   02/02/23 194 lb 9.6 oz (88.3 kg)   02/21/22 196 lb (88.9 kg)     Temp Readings from Last 3 Encounters:   No data found for Temp     BP Readings from Last 3 Encounters:   02/15/23 136/78   02/21/22 (!) 142/80   02/16/22 132/72     Pulse Readings from Last 3 Encounters:   02/15/23 65   02/16/22 74   02/03/19 82       Physical Exam:    /78 (Site: Right Upper Arm, Position: Sitting, Cuff Size: Small Adult)   Pulse 65   Ht 5' 11\" (1.803 m)   Wt 195 lb (88.5 kg)   SpO2 99%   BMI 27.20 kg/m²    Physical Exam  Vitals and nursing note reviewed. Constitutional:       General: He is not in acute distress. Appearance: Normal appearance. HENT:      Head: Normocephalic. Nose: Nose normal.      Mouth/Throat:      Mouth: Mucous membranes are moist.   Eyes:      Extraocular Movements: Extraocular movements intact. Pupils: Pupils are equal, round, and reactive to light. Neck:      Vascular: No carotid bruit. Cardiovascular:      Rate and Rhythm: Normal rate and regular rhythm. Pulses: Normal pulses. Heart sounds: No murmur heard. No friction rub. No gallop. Pulmonary:      Effort: Pulmonary effort is normal.      Breath sounds: Normal breath sounds. No wheezing or rales. Abdominal:      Palpations: Abdomen is soft. Musculoskeletal:      Cervical back: Neck supple. Right lower leg: No edema. Left lower leg: No edema. Skin:     General: Skin is warm and dry. Neurological:      General: No focal deficit present.       Mental Status: He is alert and oriented to person, place, and time. Psychiatric:         Mood and Affect: Mood normal.       EKG NSR, RBBB, RBBB is a bit more pronounced with assoc ST abn     Impression and Plan:  Jay Brown is a 78 y.o. with:    1.) S/p NSTEMI with mv CAD, CABG 2/8/19  2.) Normal LV function  3.) HTN, well controlled  4.) DM2, known  5.) +FH  6.) Former tobacco  7.) Recurrent syncopal episode, suspect vasovagal  8.) RBBB, known    EKG is normal variant, reassurance given  Does better on less BP meds due to long history of syncope  He's on ASA + HI statin for CAD  RTC yearly with EKG      Thank you for allowing me to participate in the care of your patient, please do not hesitate to call with questions or concerns. No follow-up provider specified.     Son Edwards, DO, DO

## 2023-03-16 ENCOUNTER — OFFICE VISIT (OUTPATIENT)
Age: 78
End: 2023-03-16

## 2023-03-16 VITALS — HEIGHT: 71 IN | BODY MASS INDEX: 27.3 KG/M2 | WEIGHT: 195 LBS

## 2023-03-16 DIAGNOSIS — M65.332 TRIGGER MIDDLE FINGER OF LEFT HAND: Primary | ICD-10-CM

## 2023-03-16 NOTE — PROGRESS NOTES
Katerin Garcia is a 78 y.o. male right handed retiree. Worker's Compensation and legal considerations: none    Chief Complaint   Patient presents with    Hand Pain     Lt      Pain Score:   2    HPI: Patient presents today for follow-up. At his last visit he received left index and middle finger trigger finger injections. He says left index finger is doing fine but the middle finger is still occasionally locking. Initial HPI: Patient presents today with complaints of left index and middle finger pain and locking.     Date of onset: Chronic with recent recurrence in 2022  Injury: No  Prior Treatment:  Yes: Comment: Left index and middle trigger finger injections    ROS: Review of Systems - General ROS: negative except HPI    Past Medical History:   Diagnosis Date    Arthritis     Diabetes ([facility] [address]     Hypertension        Past Surgical History:   Procedure Laterality Date    HEENT      tumor removed from neck    LAP,CHOLECYSTECTOMY  11/12/15    Dr. Ayana Cordon      back surgery x2    ORTHOPEDIC SURGERY      elbow         Current Outpatient Medications   Medication Sig Dispense Refill    metFORMIN (GLUCOPHAGE) 500 MG tablet Take 500 mg by mouth 2 times daily      vitamin D (ERGOCALCIFEROL) 1.25 MG (53287 UT) CAPS capsule Take by mouth daily      Multiple Vitamin (MULTIVITAMIN ADULT PO) Take 1 tablet by mouth daily      aspirin 81 MG EC tablet Take 81 mg by mouth daily      atorvastatin (LIPITOR) 40 MG tablet Take 40 mg by mouth nightly      fluticasone (FLONASE) 50 MCG/ACT nasal spray 1 spray by Nasal route 2 times daily      glimepiride (AMARYL) 2 MG tablet Take 2 mg by mouth every morning      meloxicam (MOBIC) 15 MG tablet Take 15 mg by mouth daily      olmesartan (BENICAR) 40 MG tablet Take 40 mg by mouth daily      OZEMPIC, 1 MG/DOSE, 4 MG/3ML SOPN every 7 days      amLODIPine (NORVASC) 2.5 MG tablet Take 2.5 mg by mouth every morning       No current facility-administered medications for this visit. Allergies   Allergen Reactions    Amoxicillin-Pot Clavulanate Other (See Comments)     Turns his tongue black  Rash & Hives           Ht 5' 11\" (1.803 m)   Wt 195 lb (88.5 kg)   BMI 27.20 kg/m²   Physical Exam  Vitals and nursing note reviewed. Constitutional:       General: He is not in acute distress. Appearance: Normal appearance. He is not ill-appearing. Cardiovascular:      Pulses: Normal pulses. Pulmonary:      Effort: Pulmonary effort is normal. No respiratory distress. Musculoskeletal:         General: Tenderness present. No swelling, deformity or signs of injury. Normal range of motion. Cervical back: Normal range of motion and neck supple. Right lower leg: No edema. Left lower leg: No edema. Skin:     General: Skin is warm and dry. Capillary Refill: Capillary refill takes less than 2 seconds. Findings: No bruising or erythema. Neurological:      General: No focal deficit present. Mental Status: He is alert and oriented to person, place, and time. Psychiatric:         Mood and Affect: Mood normal.         Behavior: Behavior normal.        Hand:    Examination L Digit(s) R Digit(s)   1st CMC Tenderness -  -    1st [facility]MC Grind -  -    Srikanth Nodes -  -    Heberden Nodes -  -    A1 Pulley Tenderness +- LF -    Triggering +  -    UCL Instability -  -    RCL Instability -  -    Lateral Stress Pain -  -    Palmar Cords -  -    Tabletop test -  -    Garrod's Pads -  -     Strength       Pinch Strength         ROM: Full      Imaging:     None indicated      Impression     Diagnosis Orders   1. Trigger middle finger of left hand              Plan:     Discussed possibility for repeat injection in the future but it is too soon now as it is only been 6 weeks. Return in about 6 weeks (around 4/27/2023) for Reevaluation and possible injection.      Plan was reviewed with patient, who verbalized agreement and understanding of the plan    Note: This note was completed using voice recognition software.   Any typographical/name errors or mistakes are unintentional.

## 2023-04-27 ENCOUNTER — OFFICE VISIT (OUTPATIENT)
Age: 78
End: 2023-04-27

## 2023-04-27 VITALS — WEIGHT: 194 LBS | BODY MASS INDEX: 27.16 KG/M2 | HEIGHT: 71 IN

## 2023-04-27 DIAGNOSIS — M65.332 TRIGGER MIDDLE FINGER OF LEFT HAND: Primary | ICD-10-CM

## 2023-04-27 NOTE — PROGRESS NOTES
Rory Stewart is a 78 y.o. male right handed retiree. Worker's Compensation and legal considerations: none    Chief Complaint   Patient presents with    Hand Pain     Left finger     Pain Score:   1    HPI: Patient presents today due to recurrence of left middle finger pain and locking. 3/16/2023 HPI: Patient presents today for follow-up. At his last visit he received left index and middle finger trigger finger injections. He says left index finger is doing fine but the middle finger is still occasionally locking. Initial HPI: Patient presents today with complaints of left index and middle finger pain and locking.     Date of onset: Chronic with recent recurrence in 2022  Injury: No  Prior Treatment:  Yes: Comment: Left index and middle trigger finger injections    ROS: Review of Systems - General ROS: negative except HPI    Past Medical History:   Diagnosis Date    Arthritis     Diabetes ([facility] [address]     Hypertension        Past Surgical History:   Procedure Laterality Date    HEENT      tumor removed from neck    LAP,CHOLECYSTECTOMY  11/12/15    Dr. Tang Bone      back surgery x2    ORTHOPEDIC SURGERY      elbow         Current Outpatient Medications   Medication Sig Dispense Refill    metFORMIN (GLUCOPHAGE) 500 MG tablet Take 1 tablet by mouth 2 times daily      vitamin D (ERGOCALCIFEROL) 1.25 MG (08218 UT) CAPS capsule Take by mouth daily      Multiple Vitamin (MULTIVITAMIN ADULT PO) Take 1 tablet by mouth daily      aspirin 81 MG EC tablet Take 1 tablet by mouth daily      atorvastatin (LIPITOR) 40 MG tablet Take 1 tablet by mouth nightly      fluticasone (FLONASE) 50 MCG/ACT nasal spray 1 spray by Nasal route 2 times daily      glimepiride (AMARYL) 2 MG tablet Take 1 tablet by mouth every morning      meloxicam (MOBIC) 15 MG tablet Take 1 tablet by mouth daily      olmesartan (BENICAR) 40 MG tablet Take 1 tablet by mouth daily      OZEMPIC, 1 MG/DOSE, 4 MG/3ML SOPN every 7 days

## 2023-07-31 RX ORDER — ATORVASTATIN CALCIUM 40 MG/1
40 TABLET, FILM COATED ORAL NIGHTLY
Qty: 90 TABLET | Refills: 3 | Status: SHIPPED | OUTPATIENT
Start: 2023-07-31

## 2023-11-06 ENCOUNTER — OFFICE VISIT (OUTPATIENT)
Age: 78
End: 2023-11-06

## 2023-11-06 VITALS
WEIGHT: 194 LBS | SYSTOLIC BLOOD PRESSURE: 144 MMHG | HEIGHT: 71 IN | DIASTOLIC BLOOD PRESSURE: 83 MMHG | BODY MASS INDEX: 27.16 KG/M2

## 2023-11-06 DIAGNOSIS — M65.332 TRIGGER MIDDLE FINGER OF LEFT HAND: ICD-10-CM

## 2023-11-06 DIAGNOSIS — M65.332 TRIGGER MIDDLE FINGER OF LEFT HAND: Primary | ICD-10-CM

## 2023-11-06 DIAGNOSIS — M65.341 TRIGGER RING FINGER OF RIGHT HAND: ICD-10-CM

## 2023-11-06 DIAGNOSIS — Z01.818 PREOP EXAMINATION: Primary | ICD-10-CM

## 2023-11-06 DIAGNOSIS — M65.311 TRIGGER THUMB OF RIGHT HAND: ICD-10-CM

## 2023-11-06 NOTE — PROGRESS NOTES
uQintin Morelos is a 70 y.o. male right handed retiree. Worker's Compensation and legal considerations: none    Chief Complaint   Patient presents with    Hand Pain     Bilateral hand pain     Pain Score:   5    HPI: Patient presents today with a new problem of right thumb and ring finger pain and locking as well as recurrence of left middle finger pain and locking. 4/27/2023 HPI: Patient presents today due to recurrence of left middle finger pain and locking. Initial HPI: Patient presents today with complaints of left index and middle finger pain and locking.     Date of onset: Chronic with recent recurrence in 2022  Injury: No  Prior Treatment:  Yes: Comment: Left index and middle trigger finger injections    ROS: Review of Systems - General ROS: negative except HPI    Past Medical History:   Diagnosis Date    Arthritis     Diabetes ([address])     Hypertension        Past Surgical History:   Procedure Laterality Date    HEENT      tumor removed from neck    LAP,CHOLECYSTECTOMY  11/12/15    Dr. Sb Johnson      back surgery x2    ORTHOPEDIC SURGERY      elbow         Current Outpatient Medications   Medication Sig Dispense Refill    atorvastatin (LIPITOR) 40 MG tablet Take 1 tablet by mouth nightly 90 tablet 3    metFORMIN (GLUCOPHAGE) 500 MG tablet Take 1 tablet by mouth 2 times daily      vitamin D (ERGOCALCIFEROL) 1.25 MG (18672 UT) CAPS capsule Take by mouth daily      Multiple Vitamin (MULTIVITAMIN ADULT PO) Take 1 tablet by mouth daily      aspirin 81 MG EC tablet Take 1 tablet by mouth daily      fluticasone (FLONASE) 50 MCG/ACT nasal spray 1 spray by Nasal route 2 times daily      glimepiride (AMARYL) 2 MG tablet Take 1 tablet by mouth every morning      meloxicam (MOBIC) 15 MG tablet Take 1 tablet by mouth daily      olmesartan (BENICAR) 40 MG tablet Take 1 tablet by mouth daily      OZEMPIC, 1 MG/DOSE, 4 MG/3ML SOPN every 7 days      amLODIPine (NORVASC) 2.5 MG tablet Take 1 tablet

## 2023-11-21 ENCOUNTER — HOSPITAL ENCOUNTER (OUTPATIENT)
Facility: HOSPITAL | Age: 78
Discharge: HOME | End: 2023-11-24
Payer: MEDICARE

## 2023-11-21 DIAGNOSIS — M65.332 TRIGGER MIDDLE FINGER OF LEFT HAND: ICD-10-CM

## 2023-11-21 DIAGNOSIS — Z01.818 PREOP EXAMINATION: ICD-10-CM

## 2023-11-21 LAB
ALBUMIN SERPL-MCNC: 4.4 G/DL (ref 3.4–5)
ALBUMIN/GLOB SERPL: 1.4 (ref 0.8–1.7)
ALP SERPL-CCNC: 67 U/L (ref 45–117)
ALT SERPL-CCNC: 49 U/L (ref 16–61)
ANION GAP SERPL CALC-SCNC: 7 MMOL/L (ref 3–18)
AST SERPL-CCNC: 20 U/L (ref 10–38)
BASOPHILS # BLD: 0.1 K/UL (ref 0–0.1)
BASOPHILS NFR BLD: 1 % (ref 0–2)
BILIRUB SERPL-MCNC: 0.4 MG/DL (ref 0.2–1)
BUN SERPL-MCNC: 20 MG/DL (ref 7–18)
BUN/CREAT SERPL: 15 (ref 12–20)
CALCIUM SERPL-MCNC: 9.5 MG/DL (ref 8.5–10.1)
CHLORIDE SERPL-SCNC: 105 MMOL/L (ref 100–111)
CO2 SERPL-SCNC: 26 MMOL/L (ref 21–32)
CREAT SERPL-MCNC: 1.33 MG/DL (ref 0.6–1.3)
DIFFERENTIAL METHOD BLD: NORMAL
EOSINOPHIL # BLD: 0.2 K/UL (ref 0–0.4)
EOSINOPHIL NFR BLD: 3 % (ref 0–5)
ERYTHROCYTE [DISTWIDTH] IN BLOOD BY AUTOMATED COUNT: 13.1 % (ref 11.6–14.5)
EST. AVERAGE GLUCOSE BLD GHB EST-MCNC: 154 MG/DL
GLOBULIN SER CALC-MCNC: 3.1 G/DL (ref 2–4)
GLUCOSE SERPL-MCNC: 149 MG/DL (ref 74–99)
HBA1C MFR BLD: 7 % (ref 4.2–5.6)
HCT VFR BLD AUTO: 41 % (ref 36–48)
HGB BLD-MCNC: 13.3 G/DL (ref 13–16)
IMM GRANULOCYTES # BLD AUTO: 0 K/UL (ref 0–0.04)
IMM GRANULOCYTES NFR BLD AUTO: 0 % (ref 0–0.5)
LYMPHOCYTES # BLD: 2 K/UL (ref 0.9–3.6)
LYMPHOCYTES NFR BLD: 28 % (ref 21–52)
MCH RBC QN AUTO: 29.2 PG (ref 24–34)
MCHC RBC AUTO-ENTMCNC: 32.4 G/DL (ref 31–37)
MCV RBC AUTO: 89.9 FL (ref 78–100)
MONOCYTES # BLD: 0.6 K/UL (ref 0.05–1.2)
MONOCYTES NFR BLD: 8 % (ref 3–10)
NEUTS SEG # BLD: 4.2 K/UL (ref 1.8–8)
NEUTS SEG NFR BLD: 60 % (ref 40–73)
NRBC # BLD: 0 K/UL (ref 0–0.01)
NRBC BLD-RTO: 0 PER 100 WBC
PLATELET # BLD AUTO: 230 K/UL (ref 135–420)
PMV BLD AUTO: 10.8 FL (ref 9.2–11.8)
POTASSIUM SERPL-SCNC: 4.4 MMOL/L (ref 3.5–5.5)
PROT SERPL-MCNC: 7.5 G/DL (ref 6.4–8.2)
RBC # BLD AUTO: 4.56 M/UL (ref 4.35–5.65)
SODIUM SERPL-SCNC: 138 MMOL/L (ref 136–145)
WBC # BLD AUTO: 7 K/UL (ref 4.6–13.2)

## 2023-11-21 PROCEDURE — 83036 HEMOGLOBIN GLYCOSYLATED A1C: CPT

## 2023-11-21 PROCEDURE — 85025 COMPLETE CBC W/AUTO DIFF WBC: CPT

## 2023-11-21 PROCEDURE — 80053 COMPREHEN METABOLIC PANEL: CPT

## 2023-11-21 PROCEDURE — 36415 COLL VENOUS BLD VENIPUNCTURE: CPT

## 2023-11-28 PROBLEM — M65.332 TRIGGER MIDDLE FINGER OF LEFT HAND: Status: ACTIVE | Noted: 2023-11-28

## 2023-11-28 RX ORDER — GLIMEPIRIDE 2 MG/1
3 TABLET ORAL
COMMUNITY

## 2023-11-28 RX ORDER — KETOTIFEN FUMARATE 0.25 MG/ML
1 SOLUTION/ DROPS OPHTHALMIC AS NEEDED
COMMUNITY
Start: 2023-01-04

## 2023-11-28 RX ORDER — OMEPRAZOLE 20 MG/1
20 CAPSULE, DELAYED RELEASE ORAL DAILY
COMMUNITY
Start: 2023-10-17

## 2023-11-28 NOTE — PERIOP NOTE
Instructions for your surgery at [address] [place] [address] Date:  11/28/2023      Patient's Name:  Anish Lott. Surgery Date:  12/05/2023              Please enter the main entrance of the hospital and check-in at the  located in the [facility]. Once checked in at the , you will take the elevators to the second floor, and report to the waiting room on the left. The room will say Procedure Registration. Do NOT eat or drink anything, including candy, gum, or ice chips after midnight prior to your surgery, unless you have specific instructions from your surgeon or anesthesia provider to do so. Brush your teeth before coming to the hospital. You may swish with water, but do not swallow. No smoking/Vaping/E-Cigarettes 24 hours prior to the day of surgery. No alcohol 24 hours prior to the day of surgery. No recreational drugs for one week prior to the day of surgery. Bring Photo ID, Insurance information, and Co-pay if required on day of surgery. Bring in pertinent legal documents, such as, Medical Power of JULI MCCORMACK, DNR, Advance Directive, etc.  Leave all valuables, including money/purse, at home. Remove all jewelry, including ALL body piercings, nail polish, acrylic nails, and makeup (including mascara); no lotions, powders, deodorant, or perfume/cologne/after shave on the skin. Follow instruction for Hibiclens washes and CHG wipes from surgeon's office. Glasses and dentures may be worn to the hospital. They must be removed prior to surgery. Please bring case/container for glasses or dentures. Contact lenses should not be worn on day of surgery. Call your doctor's office if symptoms of a cold or illness develop within 24-48 hours prior to your surgery. Call your doctor's office if you have any questions concerning insurance or co-pays. 15. AN ADULT (relative or friend 25 years or older) [address]   16. Please make

## 2023-11-28 NOTE — DISCHARGE INSTRUCTIONS
Ice and Elevate operative wound/dressing. Begin moving fingers immediately after surgery. Keep dressing clean and dry. Cover for showering. On Friday remove dressing, wash, dry, and leave open to air. DISCHARGE SUMMARY from Nurse    PATIENT INSTRUCTIONS:    After general anesthesia or intravenous sedation, for 24 hours or while taking prescription Narcotics:  Limit your activities  Do not drive and operate hazardous machinery  Do not make important personal or business decisions  Do  not drink alcoholic beverages  If you have not urinated within 8 hours after discharge, please contact your surgeon on call. Report the following to your surgeon:  Excessive pain, swelling, redness or odor of or around the surgical area  Temperature over 100.5  Nausea and vomiting lasting longer than 4 hours or if unable to take medications  Any signs of decreased circulation or nerve impairment to extremity: change in color, persistent  numbness, tingling, coldness or increase pain  Any questions      These are general instructions for a healthy lifestyle:    No smoking/ No tobacco products/ Avoid exposure to second hand smoke  Surgeon General's Warning:  Quitting smoking now greatly reduces serious risk to your health. Obesity, smoking, and sedentary lifestyle greatly increases your risk for illness    A healthy diet, regular physical exercise & weight monitoring are important for maintaining a healthy lifestyle    You may be retaining fluid if you have a history of heart failure or if you experience any of the following symptoms:  Weight gain of 3 pounds or more overnight or 5 pounds in a week, increased swelling in our hands or feet or shortness of breath while lying flat in bed. Please call your doctor as soon as you notice any of these symptoms; do not wait until your next office visit. The discharge information has been reviewed with the patient and spouse.   The patient and spouse verbalized

## 2023-11-29 ENCOUNTER — TELEPHONE (OUTPATIENT)
Age: 78
End: 2023-11-29

## 2023-11-29 NOTE — TELEPHONE ENCOUNTER
[facility] faxed request for cardiac clearance for hand surgery with Dr Shahram Veras on 12/5/23.     Verbal order and read back per Coretta Santana, DO  Low to moderate risk for procedure     Faxed form back to provider

## 2023-12-04 ENCOUNTER — ANESTHESIA EVENT (OUTPATIENT)
Facility: HOSPITAL | Age: 78
End: 2023-12-04
Payer: MEDICARE

## 2023-12-05 ENCOUNTER — ANESTHESIA (OUTPATIENT)
Facility: HOSPITAL | Age: 78
End: 2023-12-05
Payer: MEDICARE

## 2023-12-05 ENCOUNTER — HOSPITAL ENCOUNTER (OUTPATIENT)
Facility: HOSPITAL | Age: 78
Setting detail: OUTPATIENT SURGERY
Discharge: HOME | End: 2023-12-05
Attending: ORTHOPAEDIC SURGERY | Admitting: ORTHOPAEDIC SURGERY
Payer: MEDICARE

## 2023-12-05 VITALS
OXYGEN SATURATION: 95 % | HEART RATE: 79 BPM | HEIGHT: 71 IN | RESPIRATION RATE: 15 BRPM | DIASTOLIC BLOOD PRESSURE: 72 MMHG | SYSTOLIC BLOOD PRESSURE: 121 MMHG | BODY MASS INDEX: 26.15 KG/M2 | TEMPERATURE: 97.7 F | WEIGHT: 186.8 LBS

## 2023-12-05 DIAGNOSIS — M65.332 TRIGGER MIDDLE FINGER OF LEFT HAND: Primary | ICD-10-CM

## 2023-12-05 LAB — GLUCOSE BLD STRIP.AUTO-MCNC: 170 MG/DL (ref 70–110)

## 2023-12-05 PROCEDURE — 7100000010 HC PHASE II RECOVERY - FIRST 15 MIN: Performed by: ORTHOPAEDIC SURGERY

## 2023-12-05 PROCEDURE — 2500000003 HC RX 250 WO HCPCS: Performed by: NURSE ANESTHETIST, CERTIFIED REGISTERED

## 2023-12-05 PROCEDURE — 2709999900 HC NON-CHARGEABLE SUPPLY: Performed by: ORTHOPAEDIC SURGERY

## 2023-12-05 PROCEDURE — 2580000003 HC RX 258: Performed by: NURSE ANESTHETIST, CERTIFIED REGISTERED

## 2023-12-05 PROCEDURE — 3700000001 HC ADD 15 MINUTES (ANESTHESIA): Performed by: ORTHOPAEDIC SURGERY

## 2023-12-05 PROCEDURE — A4217 STERILE WATER/SALINE, 500 ML: HCPCS | Performed by: ORTHOPAEDIC SURGERY

## 2023-12-05 PROCEDURE — 3600000012 HC SURGERY LEVEL 2 ADDTL 15MIN: Performed by: ORTHOPAEDIC SURGERY

## 2023-12-05 PROCEDURE — 26055 INCISE FINGER TENDON SHEATH: CPT | Performed by: ORTHOPAEDIC SURGERY

## 2023-12-05 PROCEDURE — 2500000003 HC RX 250 WO HCPCS: Performed by: ORTHOPAEDIC SURGERY

## 2023-12-05 PROCEDURE — 6370000000 HC RX 637 (ALT 250 FOR IP): Performed by: NURSE ANESTHETIST, CERTIFIED REGISTERED

## 2023-12-05 PROCEDURE — 2580000003 HC RX 258: Performed by: ORTHOPAEDIC SURGERY

## 2023-12-05 PROCEDURE — 6360000002 HC RX W HCPCS: Performed by: ORTHOPAEDIC SURGERY

## 2023-12-05 PROCEDURE — 3700000000 HC ANESTHESIA ATTENDED CARE: Performed by: ORTHOPAEDIC SURGERY

## 2023-12-05 PROCEDURE — 6360000002 HC RX W HCPCS: Performed by: NURSE ANESTHETIST, CERTIFIED REGISTERED

## 2023-12-05 PROCEDURE — 7100000011 HC PHASE II RECOVERY - ADDTL 15 MIN: Performed by: ORTHOPAEDIC SURGERY

## 2023-12-05 PROCEDURE — 82962 GLUCOSE BLOOD TEST: CPT

## 2023-12-05 PROCEDURE — 3600000002 HC SURGERY LEVEL 2 BASE: Performed by: ORTHOPAEDIC SURGERY

## 2023-12-05 PROCEDURE — 7100000000 HC PACU RECOVERY - FIRST 15 MIN: Performed by: ORTHOPAEDIC SURGERY

## 2023-12-05 RX ORDER — MIDAZOLAM HYDROCHLORIDE 1 MG/ML
INJECTION INTRAMUSCULAR; INTRAVENOUS PRN
Status: DISCONTINUED | OUTPATIENT
Start: 2023-12-05 | End: 2023-12-05 | Stop reason: SDUPTHER

## 2023-12-05 RX ORDER — ONDANSETRON 2 MG/ML
INJECTION INTRAMUSCULAR; INTRAVENOUS PRN
Status: DISCONTINUED | OUTPATIENT
Start: 2023-12-05 | End: 2023-12-05 | Stop reason: SDUPTHER

## 2023-12-05 RX ORDER — HYDROCODONE BITARTRATE AND ACETAMINOPHEN 5; 325 MG/1; MG/1
1 TABLET ORAL EVERY 6 HOURS PRN
Qty: 20 TABLET | Refills: 0 | Status: SHIPPED | OUTPATIENT
Start: 2023-12-05 | End: 2023-12-10

## 2023-12-05 RX ORDER — ONDANSETRON 2 MG/ML
4 INJECTION INTRAMUSCULAR; INTRAVENOUS
Status: CANCELLED | OUTPATIENT
Start: 2023-12-05 | End: 2023-12-06

## 2023-12-05 RX ORDER — KETOROLAC TROMETHAMINE 15 MG/ML
INJECTION, SOLUTION INTRAMUSCULAR; INTRAVENOUS PRN
Status: DISCONTINUED | OUTPATIENT
Start: 2023-12-05 | End: 2023-12-05 | Stop reason: SDUPTHER

## 2023-12-05 RX ORDER — FENTANYL CITRATE 50 UG/ML
50 INJECTION, SOLUTION INTRAMUSCULAR; INTRAVENOUS EVERY 5 MIN PRN
Status: CANCELLED | OUTPATIENT
Start: 2023-12-05

## 2023-12-05 RX ORDER — LIDOCAINE HYDROCHLORIDE 20 MG/ML
INJECTION, SOLUTION EPIDURAL; INFILTRATION; INTRACAUDAL; PERINEURAL PRN
Status: DISCONTINUED | OUTPATIENT
Start: 2023-12-05 | End: 2023-12-05 | Stop reason: SDUPTHER

## 2023-12-05 RX ORDER — GLYCOPYRROLATE 0.2 MG/ML
INJECTION INTRAMUSCULAR; INTRAVENOUS PRN
Status: DISCONTINUED | OUTPATIENT
Start: 2023-12-05 | End: 2023-12-05 | Stop reason: SDUPTHER

## 2023-12-05 RX ORDER — DEXTROSE MONOHYDRATE 100 MG/ML
INJECTION, SOLUTION INTRAVENOUS CONTINUOUS PRN
Status: CANCELLED | OUTPATIENT
Start: 2023-12-05

## 2023-12-05 RX ORDER — FAMOTIDINE 20 MG/1
20 TABLET, FILM COATED ORAL ONCE
Status: COMPLETED | OUTPATIENT
Start: 2023-12-05 | End: 2023-12-05

## 2023-12-05 RX ORDER — SODIUM CHLORIDE 0.9 % (FLUSH) 0.9 %
5-40 SYRINGE (ML) INJECTION EVERY 12 HOURS SCHEDULED
Status: DISCONTINUED | OUTPATIENT
Start: 2023-12-05 | End: 2023-12-05 | Stop reason: HOSPADM

## 2023-12-05 RX ORDER — SODIUM CHLORIDE 0.9 % (FLUSH) 0.9 %
5-40 SYRINGE (ML) INJECTION PRN
Status: DISCONTINUED | OUTPATIENT
Start: 2023-12-05 | End: 2023-12-05 | Stop reason: HOSPADM

## 2023-12-05 RX ORDER — PROPOFOL 10 MG/ML
INJECTION, EMULSION INTRAVENOUS PRN
Status: DISCONTINUED | OUTPATIENT
Start: 2023-12-05 | End: 2023-12-05 | Stop reason: SDUPTHER

## 2023-12-05 RX ORDER — ACETAMINOPHEN 500 MG
1000 TABLET ORAL ONCE
Status: COMPLETED | OUTPATIENT
Start: 2023-12-05 | End: 2023-12-05

## 2023-12-05 RX ORDER — SODIUM CHLORIDE, SODIUM LACTATE, POTASSIUM CHLORIDE, CALCIUM CHLORIDE 600; 310; 30; 20 MG/100ML; MG/100ML; MG/100ML; MG/100ML
INJECTION, SOLUTION INTRAVENOUS CONTINUOUS
Status: DISCONTINUED | OUTPATIENT
Start: 2023-12-05 | End: 2023-12-05 | Stop reason: HOSPADM

## 2023-12-05 RX ORDER — PHENYLEPHRINE HCL IN 0.9% NACL 1 MG/10 ML
SYRINGE (ML) INTRAVENOUS PRN
Status: DISCONTINUED | OUTPATIENT
Start: 2023-12-05 | End: 2023-12-05 | Stop reason: SDUPTHER

## 2023-12-05 RX ORDER — CLINDAMYCIN PHOSPHATE 900 MG/50ML
900 INJECTION, SOLUTION INTRAVENOUS
Status: COMPLETED | OUTPATIENT
Start: 2023-12-05 | End: 2023-12-05

## 2023-12-05 RX ORDER — SODIUM CHLORIDE 9 MG/ML
INJECTION, SOLUTION INTRAVENOUS PRN
Status: DISCONTINUED | OUTPATIENT
Start: 2023-12-05 | End: 2023-12-05 | Stop reason: HOSPADM

## 2023-12-05 RX ORDER — DIPHENHYDRAMINE HYDROCHLORIDE 50 MG/ML
12.5 INJECTION INTRAMUSCULAR; INTRAVENOUS
Status: CANCELLED | OUTPATIENT
Start: 2023-12-05 | End: 2023-12-06

## 2023-12-05 RX ORDER — LIDOCAINE HYDROCHLORIDE 10 MG/ML
1 INJECTION, SOLUTION EPIDURAL; INFILTRATION; INTRACAUDAL; PERINEURAL
Status: DISCONTINUED | OUTPATIENT
Start: 2023-12-05 | End: 2023-12-05 | Stop reason: HOSPADM

## 2023-12-05 RX ORDER — SODIUM CHLORIDE 0.9 % (FLUSH) 0.9 %
5-40 SYRINGE (ML) INJECTION PRN
Status: CANCELLED | OUTPATIENT
Start: 2023-12-05

## 2023-12-05 RX ORDER — FENTANYL CITRATE 50 UG/ML
INJECTION, SOLUTION INTRAMUSCULAR; INTRAVENOUS PRN
Status: DISCONTINUED | OUTPATIENT
Start: 2023-12-05 | End: 2023-12-05 | Stop reason: SDUPTHER

## 2023-12-05 RX ADMIN — KETOROLAC TROMETHAMINE 15 MG: 15 INJECTION, SOLUTION INTRAMUSCULAR; INTRAVENOUS at 10:45

## 2023-12-05 RX ADMIN — DEXMEDETOMIDINE HYDROCHLORIDE 8 MCG: 100 INJECTION, SOLUTION INTRAVENOUS at 10:22

## 2023-12-05 RX ADMIN — ACETAMINOPHEN 1000 MG: 500 TABLET ORAL at 09:52

## 2023-12-05 RX ADMIN — PROPOFOL 50 MG: 10 INJECTION, EMULSION INTRAVENOUS at 10:26

## 2023-12-05 RX ADMIN — FENTANYL CITRATE 25 MCG: 50 INJECTION INTRAMUSCULAR; INTRAVENOUS at 10:26

## 2023-12-05 RX ADMIN — FENTANYL CITRATE 25 MCG: 50 INJECTION INTRAMUSCULAR; INTRAVENOUS at 10:39

## 2023-12-05 RX ADMIN — LIDOCAINE HYDROCHLORIDE 20 MG: 20 INJECTION, SOLUTION EPIDURAL; INFILTRATION; INTRACAUDAL; PERINEURAL at 10:26

## 2023-12-05 RX ADMIN — PROPOFOL 50 MCG/KG/MIN: 10 INJECTION, EMULSION INTRAVENOUS at 10:27

## 2023-12-05 RX ADMIN — ONDANSETRON 4 MG: 2 INJECTION INTRAMUSCULAR; INTRAVENOUS at 10:26

## 2023-12-05 RX ADMIN — FENTANYL CITRATE 25 MCG: 50 INJECTION INTRAMUSCULAR; INTRAVENOUS at 10:22

## 2023-12-05 RX ADMIN — CLINDAMYCIN PHOSPHATE 900 MG: 900 INJECTION, SOLUTION INTRAVENOUS at 10:30

## 2023-12-05 RX ADMIN — SODIUM CHLORIDE, POTASSIUM CHLORIDE, SODIUM LACTATE AND CALCIUM CHLORIDE: 600; 310; 30; 20 INJECTION, SOLUTION INTRAVENOUS at 09:52

## 2023-12-05 RX ADMIN — MIDAZOLAM 2 MG: 1 INJECTION, SOLUTION INTRAMUSCULAR; INTRAVENOUS at 10:22

## 2023-12-05 RX ADMIN — FENTANYL CITRATE 25 MCG: 50 INJECTION INTRAMUSCULAR; INTRAVENOUS at 10:32

## 2023-12-05 RX ADMIN — Medication 100 MCG: at 10:29

## 2023-12-05 RX ADMIN — GLYCOPYRROLATE 0.2 MG: 0.2 INJECTION INTRAMUSCULAR; INTRAVENOUS at 10:22

## 2023-12-05 RX ADMIN — FAMOTIDINE 20 MG: 20 TABLET ORAL at 09:52

## 2023-12-05 ASSESSMENT — PAIN - FUNCTIONAL ASSESSMENT: PAIN_FUNCTIONAL_ASSESSMENT: 0-10

## 2023-12-05 NOTE — PERIOP NOTE
Patients blood sugar was 170 in pre-op area. Patient does not take insulin at home. After speaking with Anesthesia, Dr Glory Scott, no coverage is needed at this time.

## 2023-12-05 NOTE — ANESTHESIA PRE PROCEDURE
\"COVID19\"        Anesthesia Evaluation  Patient summary reviewed  Airway: Mallampati: II  TM distance: >3 FB   Neck ROM: full  Mouth opening: > = 3 FB   Dental: normal exam         Pulmonary:Negative Pulmonary ROS and normal exam                               Cardiovascular:  Exercise tolerance: good (>4 METS)  (+) hypertension: no interval change, past MI: no interval change, CAD:, CABG/stent: no interval change                  Neuro/Psych:   Negative Neuro/Psych ROS              GI/Hepatic/Renal: Neg GI/Hepatic/Renal ROS  (+) renal disease: CRI          Endo/Other:    (+) DiabetesType II DM, no interval change. Abdominal:             Vascular: negative vascular ROS. Other Findings:         Anesthesia Plan      MAC     ASA 3       Induction: intravenous. Anesthetic plan and risks discussed with patient. Plan discussed with CRNA.                 Suad Lopez MD   12/5/2023

## 2023-12-05 NOTE — OP NOTE
Operative Note      Patient: Adriana Peacock. YOB: 1945  MRN: [id number]    Date of Procedure: 12/5/2023    Pre-Op Diagnosis Codes:     * Trigger middle finger of left hand [M65.332]    Post-Op Diagnosis: Same       Procedure(s):  LEFT MIDDLE FINGER TRIGGER RELEASE    Surgeon(s): Alberto Watt DO    Assistant:   Surgical Assistant: Raina Monroe    Anesthesia: Monitor Anesthesia Care    Estimated Blood Loss (mL): Minimal    Complications: None    Specimens:   * No specimens in log *    Implants:  * No implants in log *      Drains: * No LDAs found *    Findings: thickened inflamed a1 pulley      Detailed Description of Procedure: Indications for procedure been outlined in the perioperative documentation, most notably being not amenable to conservative treatment. Informed consent was obtained from the patient. The risks and benefits of the procedure were discussed with the patient. They include but are not limited to neurovascular injury, tendon/ligamentous injury, blood loss, infection, incomplete release of tendon, incomplete relief of symptoms, need for further surgery, hematoma, neuroma, seroma, chronic pain, chronic stiffness, complications from anesthesia including death, and the possibility of karson Covid. After informed consent was obtained, the patient was taken back to the operative suite. A tourniquet was applied to the operative extremity and it was prepped and draped in the normal sterile fashion. The arm was exsanguinated and the tourniquet was elevated to 250 mmHg. Attention was then turned to the distal palmar crease overlying the A1 pulley. An incision was made over the crease approximately a 1 cm in length. The subcutaneous tissues were dissected and electrocautery was used for hemostasis. After blot dissection, the A1 pulley was identified and released up to the level of the A2 pulley.   The tendons were fully exposed out of the wound and removed of any

## 2023-12-05 NOTE — H&P
Update History & Physical    The patient's History and Physical of November 6, 2023 was reviewed with the patient and I examined the patient. There was no change. The surgical site was confirmed by the patient and me. Plan: The risks, benefits, expected outcome, and alternative to the recommended procedure have been discussed with the patient. Patient understands and wants to proceed with the procedure.      Electronically signed by Tianna Morris DO on 12/5/2023 at 9:29 AM

## 2023-12-05 NOTE — BRIEF OP NOTE
Brief Postoperative Note      Patient: Nette Kevin. YOB: 1945  MRN: [id number]    Date of Procedure: 12/5/2023    Pre-Op Diagnosis Codes:     * Trigger middle finger of left hand [M65.332]    Post-Op Diagnosis: Same       Procedure(s):  LEFT MIDDLE FINGER TRIGGER RELEASE    Surgeon(s):   Carina De León DO    Assistant:  Surgical Assistant: Ely Pérez    Anesthesia: Monitor Anesthesia Care    Estimated Blood Loss (mL): Minimal    Complications: None    Specimens:   * No specimens in log *    Implants:  * No implants in log *      Drains: * No LDAs found *    Findings: thickened a1 pulley      Electronically signed by Carina De León DO on 12/5/2023 at 11:34 AM

## 2023-12-05 NOTE — ANESTHESIA POSTPROCEDURE EVALUATION
Department of Anesthesiology  Postprocedure Note    Patient: Jovita Sharma MRN: [id number]  YOB: 1945  Date of evaluation: 12/5/2023      Procedure Summary       Date: 12/05/23 Room / Location: [facility] MAIN 03 / [facility] MAIN OR    Anesthesia Start: 1022 Anesthesia Stop: 9463    Procedure: LEFT MIDDLE FINGER TRIGGER RELEASE (Left: Hand) Diagnosis:       Trigger middle finger of left hand      (Trigger middle finger of left hand [M65.332])    Surgeons:  Tess Rg DO Responsible Provider: Julisa Navarrete MD    Anesthesia Type: MAC ASA Status: 3            Anesthesia Type: MAC    Óscar Phase I: Óscar Score: 10    Óscar Phase II: Óscar Score: 10      Anesthesia Post Evaluation    Patient location during evaluation: PACU  Patient participation: complete - patient participated  Level of consciousness: awake and alert  Pain score: 0  Airway patency: patent  Nausea & Vomiting: no nausea and no vomiting  Complications: no  Cardiovascular status: hemodynamically stable  Respiratory status: acceptable  Hydration status: euvolemic  Multimodal analgesia pain management approach  Pain management: adequate

## 2023-12-05 NOTE — PROGRESS NOTES
Clindamycin changed to 900 mg x 1 on call to OR per [facility] dosing policy

## 2024-01-02 ENCOUNTER — OFFICE VISIT (OUTPATIENT)
Age: 79
End: 2024-01-02

## 2024-01-02 VITALS — BODY MASS INDEX: 26.32 KG/M2 | WEIGHT: 188 LBS | HEIGHT: 71 IN

## 2024-01-02 DIAGNOSIS — Z98.890 S/P TRIGGER FINGER RELEASE: ICD-10-CM

## 2024-01-02 DIAGNOSIS — M65.332 TRIGGER MIDDLE FINGER OF LEFT HAND: Primary | ICD-10-CM

## 2024-01-02 PROCEDURE — 99024 POSTOP FOLLOW-UP VISIT: CPT

## 2024-01-02 NOTE — PROGRESS NOTES
1.33 , GFR <55       Past Surgical History:   Procedure Laterality Date    CARDIAC SURGERY  2019    [address]     CHOLECYSTECTOMY  11/12/2015        ELBOW FRACTURE SURGERY  1959    FINGER TRIGGER RELEASE Left 12/5/2023    LEFT MIDDLE FINGER TRIGGER RELEASE performed by Az Agosto DO at [facility] MAIN OR    LUMBAR DISC SURGERY      X 2    TUMOR REMOVAL      Back of neck        Current Outpatient Medications   Medication Sig Dispense Refill    omeprazole (PRILOSEC) 20 MG delayed release capsule Take 1 capsule by mouth daily      glimepiride (AMARYL) 2 MG tablet Take 1.5 tablets by mouth every morning (before breakfast)      vitamin D (CHOLECALCIFEROL) 125 MCG (5000 UT) CAPS capsule Take 1 capsule by mouth 2 times daily      ketotifen (ZADITOR) 0.025 % ophthalmic solution Apply 1 drop to eye as needed      atorvastatin (LIPITOR) 40 MG tablet Take 1 tablet by mouth nightly 90 tablet 3    metFORMIN (GLUCOPHAGE) 500 MG tablet Take 1 tablet by mouth 2 times daily      aspirin 81 MG EC tablet Take 1 tablet by mouth daily      fluticasone (FLONASE) 50 MCG/ACT nasal spray 1 spray by Nasal route daily      meloxicam (MOBIC) 15 MG tablet Take 1 tablet by mouth daily      olmesartan (BENICAR) 40 MG tablet Take 1 tablet by mouth daily      OZEMPIC, 1 MG/DOSE, 4 MG/3ML SOPN every 7 days      amLODIPine (NORVASC) 2.5 MG tablet Take 1 tablet by mouth every morning       No current facility-administered medications for this visit.       Allergies   Allergen Reactions    Amoxicillin-Pot Clavulanate Other (See Comments)     Turns his tongue black  Rash & Hives           Ht 1.803 m (5' 11\")   Wt 85.3 kg (188 lb)   BMI 26.22 kg/m²   Physical Exam  Vitals and nursing note reviewed.   Constitutional:       General: He is not in acute distress.     Appearance: Normal appearance. He is normal weight. He is not ill-appearing, toxic-appearing or diaphoretic.   HENT:      Head: Normocephalic and atraumatic.

## 2024-02-15 ENCOUNTER — OFFICE VISIT (OUTPATIENT)
Age: 79
End: 2024-02-15

## 2024-02-15 VITALS
HEIGHT: 71 IN | WEIGHT: 189 LBS | SYSTOLIC BLOOD PRESSURE: 108 MMHG | HEART RATE: 80 BPM | BODY MASS INDEX: 26.46 KG/M2 | OXYGEN SATURATION: 98 % | DIASTOLIC BLOOD PRESSURE: 72 MMHG

## 2024-02-15 DIAGNOSIS — R55 SYNCOPE AND COLLAPSE: ICD-10-CM

## 2024-02-15 DIAGNOSIS — I21.4 NON-ST ELEVATION (NSTEMI) MYOCARDIAL INFARCTION (HCC): Primary | ICD-10-CM

## 2024-02-15 NOTE — PROGRESS NOTES
Elfego Butler Jr. presents today for   Chief Complaint   Patient presents with    Follow-up     1 year f/u with no concerns        Elfego Butler Jr. preferred language for health care discussion is english/other.    Is someone accompanying this pt? no    Is the patient using any DME equipment during OV? no    Depression Screening:  Depression: Not at risk (2/15/2024)    PHQ-2     PHQ-2 Score: 0        Learning Assessment:  Who is the primary learner? Patient    What is the preferred language for health care of the primary learner? ENGLISH    How does the primary learner prefer to learn new concepts? DEMONSTRATION    Answered By patient    Relationship to Learner SELF           Pt currently taking Anticoagulant therapy? no    Pt currently taking Antiplatelet therapy ? ASA 81 mg 1x daily       Coordination of Care:  1. Have you been to the ER, urgent care clinic since your last visit? Hospitalized since your last visit? no    2. Have you seen or consulted any other health care providers outside of the [facility] since your last visit? Include any pap smears or colon screening. no

## 2024-02-15 NOTE — PROGRESS NOTES
Elfego Butler Jr.    Chief Complaint   Patient presents with    Follow-up     1 year f/u with no concerns        HPI    Elfego Butler Jr. is a 78 y.o. gentleman here for follow up of NSTEMI and CAD.  As you know patient was found to have an NSTEMI at [facility] complaining of persistent chest pain despite a negative nuclear stress test in December 2018.  Due to his high pretest probability for ACS we went straight to cath 2/4/ 2019 which revealed MV CAD (see report below) and had subsequently underwent CABG. His EF remained normal 60%. He progressed remarkably well with cardiac rehab. I do note in others documentation that his SBP has been creeping up since hospital discharge. His BB was increased significantly and more recently ARB doubled, and still he can see SBPs 150s-170s. He thinks this is not always right as there has been a 20 point difference btwn manual and automated readings. Regardless he feels great and has no exertional symptoms. Still feels some mild soreness at the incision site.     After his last routine visit I tried to optimize his GDMT by increasing his statin to HI (20 to 40) and added HCTZ for better SBP control. He says once he started the HCTZ he started feeling dizzy all the time. He saw his PCP for this, who cut it down to 15 mg and dizziness is significantly improved almost gone but he has always felt dizzy when bending over/ squatting.      He wants to know does he truly \"need\" the Metoprolol as well as higher dose Lipitor. Says SBP was lower- even 100s at times (but 140s at his appts). Finally it was felt this was contributing to his syncopal episodes and causing fatigue so was stopped.    Past Medical History:   Diagnosis Date    Arthritis     CAD (coronary artery disease) 2018    CABG -  2019 - 5 vessel.    Diabetes (HCC)     Hypertension     Kidney disease, chronic, stage III (GFR 30-59 ml/min) ([facility]) 11/2023    Creat 1.33 , GFR <55       Past Surgical History:   Procedure Laterality

## 2024-02-28 ENCOUNTER — HOSPITAL ENCOUNTER (OUTPATIENT)
Facility: HOSPITAL | Age: 79
Discharge: HOME | End: 2024-03-02
Payer: MEDICARE

## 2024-02-28 DIAGNOSIS — J18.9 UNRESOLVED PNEUMONIA: ICD-10-CM

## 2024-02-28 PROCEDURE — 71046 X-RAY EXAM CHEST 2 VIEWS: CPT

## 2024-03-04 ENCOUNTER — OFFICE VISIT (OUTPATIENT)
Age: 79
End: 2024-03-04
Payer: MEDICARE

## 2024-03-04 VITALS — HEIGHT: 71 IN | BODY MASS INDEX: 26.18 KG/M2 | WEIGHT: 187 LBS

## 2024-03-04 DIAGNOSIS — M72.0 DUPUYTREN'S DISEASE OF PALM: ICD-10-CM

## 2024-03-04 DIAGNOSIS — Z98.890 S/P TRIGGER FINGER RELEASE: Primary | ICD-10-CM

## 2024-03-04 DIAGNOSIS — M65.311 TRIGGER THUMB OF RIGHT HAND: ICD-10-CM

## 2024-03-04 DIAGNOSIS — M65.332 TRIGGER MIDDLE FINGER OF LEFT HAND: ICD-10-CM

## 2024-03-04 PROCEDURE — 20550 NJX 1 TENDON SHEATH/LIGAMENT: CPT

## 2024-03-04 PROCEDURE — 99024 POSTOP FOLLOW-UP VISIT: CPT

## 2024-03-04 RX ORDER — LIDOCAINE HYDROCHLORIDE 10 MG/ML
0.5 INJECTION, SOLUTION INFILTRATION; PERINEURAL ONCE
Status: COMPLETED | OUTPATIENT
Start: 2024-03-04 | End: 2024-03-04

## 2024-03-04 RX ADMIN — LIDOCAINE HYDROCHLORIDE 0.5 ML: 10 INJECTION, SOLUTION INFILTRATION; PERINEURAL at 10:23

## 2024-03-04 NOTE — PROGRESS NOTES
Clavulanate Other (See Comments)     Turns his tongue black  Rash & Hives           Ht 1.803 m (5' 11\")   Wt 84.8 kg (187 lb)   BMI 26.08 kg/m²   Physical Exam  Vitals and nursing note reviewed.   Constitutional:       General: He is not in acute distress.     Appearance: Normal appearance. He is normal weight. He is not ill-appearing, toxic-appearing or diaphoretic.   HENT:      Head: Normocephalic and atraumatic.   Cardiovascular:      Pulses: Normal pulses.   Musculoskeletal:         General: Swelling and tenderness present. No deformity or signs of injury.      Cervical back: Normal range of motion and neck supple.      Right lower leg: No edema.      Left lower leg: No edema.   Skin:     General: Skin is warm and dry.      Capillary Refill: Capillary refill takes less than 2 seconds.      Findings: No bruising or erythema.   Neurological:      General: No focal deficit present.      Mental Status: He is alert and oriented to person, place, and time.   Psychiatric:         Mood and Affect: Mood normal.         Behavior: Behavior normal.          Hand:    Examination L Digit(s) R Digit(s)   1st CMC Tenderness -  -    1st CMC Grind -  -    Srikanth Nodes -  -    Heberden Nodes -  -    A1 Pulley Tenderness -  + th   Triggering -  +    UCL Instability -  -    RCL Instability -  -    Lateral Stress Pain -  -    Palmar Cords -  -    Tabletop test -  -    Garrod's Pads -  -     Strength       Pinch Strength         ROM: Full      Left Hand: Incision fully healed without any signs of breakdown or infection. Scar tissue vs dupuytrens nodule palpated proximal to the incision. Hypersensitivity over the scar. Flexion full but feels tightness at the MP and DIP. Unable to terminally extend at the MP & DIP. This is passively correctable. TTP DIP.        Imaging:     None indicated      Impression     Diagnosis Orders   1. S/P trigger finger release  BS - [facility] - [facility]      2.

## 2024-03-12 ENCOUNTER — HOSPITAL ENCOUNTER (OUTPATIENT)
Facility: HOSPITAL | Age: 79
Setting detail: RECURRING SERIES
Discharge: HOME | End: 2024-03-15
Payer: MEDICARE

## 2024-03-12 PROCEDURE — 97110 THERAPEUTIC EXERCISES: CPT

## 2024-03-12 PROCEDURE — 97161 PT EVAL LOW COMPLEX 20 MIN: CPT

## 2024-03-12 PROCEDURE — 97165 OT EVAL LOW COMPLEX 30 MIN: CPT

## 2024-03-12 NOTE — PLAN OF CARE
[facility]  [address] [facility] [address] [place] [address] Ph:[phone] Fx: [phone]    PLAN OF CARE/ Statement of Necessity for Physical Therapy Services           Patient name: Elfego Butler Jr. Start of Care: 3/12/2024   Referral source: Anjali Cardona PA-C : 1945    Medical Diagnosis: Left hand pain [M79.642]       Onset Date: 2023   Treatment Diagnosis: M79.642  Pain in left hand                                     Prior Hospitalization: see medical history Provider#: [id number]   Medications: Verified on Patient Summary List     Comorbidities:   Medical History    Diagnosis Date Comment Source   Arthritis      CAD (coronary artery disease)  CABG -  2019 vessel.    Diabetes ([facility])      Hypertension         Other Medical History      Diagnosis Date Comment Source   Kidney disease, chronic, stage III (GFR 30-59 ml/min) ([facility]) 2023 Creat 1.33 , GFR <55         Prior Level of Function: independent and pain free left hand function    77 yo RIGHT HAND DOMINANT male who presents to [facility] with c/o left hand pain pain. Patient is s/p left middle finger trigger finger release on 24. Patient reports lack of easy motion in his hand and complains of scar tenderness. Patient demonstrates decreased ROM, decreased strength, impaired posture, pain and decreased functional mobility tolerance.     Patient will continue to benefit from skilled OT services to address functional mobility deficits, address ROM deficits, address strength deficits, and analyze and address soft tissue restrictions to attain remaining goals.       Evaluation Complexity:  History:  MEDIUM  Complexity : 1-2 comorbidities / personal factors will impact the outcome/ POC ; Examination:  LOW Complexity : 1-2 Standardized tests and measures addressing body structure, function, activity limitation and / or participation in recreation  ;Presentation:  LOW Complexity :

## 2024-03-12 NOTE — PLAN OF CARE
OT DAILY TREATMENT NOTE/HAND EVAL 10-18    atient Name: Elfego Butler Jr.    Date: 3/12/2024    : 1945  Insurance: Payor: MEDICARE / Plan: MEDICARE PART A AND B / Product Type: *No Product type* /      Patient  verified yes     Visit #   Current / Total 1 8   Time   In / Out 10:15 10:55     TREATMENT AREA =  Left hand pain [M79.642]      SUBJECTIVE  Pain Level (0-10 scale): 2/10  []constant [x]intermittent [x]improving []worsening []no change since onset    Any medication changes, allergies to medications, adverse drug reactions, diagnosis change, or new procedure performed?: [x] No    [] Yes (see summary sheet for update)  Subjective functional status/changes:     PLOF: functionally independent,  active lifestyle; Right hand dominant  Limitations to PLOF: none noted  Mechanism of Injury: status post trigger finger release left middle finger  Current symptoms/Complaints: 1/10 at best with rest; 2/10 at worst with movement  Previous Treatment/Compliance: pt has not had therapy to date for this condition  PMHx/Surgical Hx:     3/4/2024 HPI: Patient presents today for a postoperative visit right at 3 months status post left middle trigger finger release on 2023.  He reports his range of motion has slightly improved since his last evaluation.  He also reports a nodule in the palm of his hand as well as significant hypersensitivity over the scar. He has trouble making a full fist. Additionally, he adds that his right thumb is painful and locking.   Work Hx: retired  Living Situation: - is building a house and enjoys time [place] his [place]  Pt Goals: \"to have movement in my left hand that feels more fluid, less labored\"  Barriers: []pain []financial []time []transportation [x]Other n/a  Motivation: good  Substance use: []Alcohol []Tobacco []other:   Cognition: A & O x 3        OBJECTIVE    20 min [x]Eval - untimed                           Therapeutic Procedures:  Tx Min Billable or 1:1 Min (if diff from

## 2024-03-14 ENCOUNTER — HOSPITAL ENCOUNTER (OUTPATIENT)
Facility: HOSPITAL | Age: 79
Setting detail: RECURRING SERIES
Discharge: HOME | End: 2024-03-17
Payer: MEDICARE

## 2024-03-14 PROCEDURE — 97110 THERAPEUTIC EXERCISES: CPT

## 2024-03-14 PROCEDURE — 97140 MANUAL THERAPY 1/> REGIONS: CPT

## 2024-03-14 NOTE — PROGRESS NOTES
12:15PHYSICAL / OCCUPATIONAL THERAPY - DAILY TREATMENT NOTE     Patient Name: Elfego Butler Jr.    Date: 3/14/2024    : 1945  Insurance: Payor: MEDICARE / Plan: MEDICARE PART A AND B / Product Type: *No Product type* /      Patient  verified Yes     Visit #   Current / Total 2 8   Time   In / Out 11:40 12:15   Pain   In / Out 0 0   Subjective Functional Status/Changes: \"My hand felt so much better after Tuesday\"   Changes to:  Allergies, Med Hx, Sx Hx?   no       TREATMENT AREA =  Left hand pain [M79.642]    OBJECTIVE      Therapeutic Procedures:  Tx Min Billable or 1:1 Min (if diff from Tx Min) Procedure, Rationale, Specifics   10  35358 Manual Therapy (timed):  decrease pain, increase ROM, increase tissue extensibility, and decrease trigger points to improve patient's ability to progress to PLOF and address remaining functional goals.  The manual therapy interventions were performed at a separate and distinct time from the therapeutic activities interventions . Details: cross friction for scar adhesions    Details if applicable:       25  80944 Therapeutic Exercise (timed):  increase ROM, strength, coordination, balance, and proprioception to improve patient's ability to progress to PLOF and address remaining functional goals. (see flow sheet as applicable)    Details if applicable:                    35  [facility] Totals Reminder: bill using total billable min of TIMED therapeutic procedures (example: do not include dry needle or estim unattended, both untimed codes, in totals to left)  8-22 min = 1 unit; 23-37 min = 2 units; 38-52 min = 3 units; 53-67 min = 4 units; 68-82 min = 5 units   Total Total     TOTAL TREATMENT TIME:        35     [x]  Patient Education billed concurrently with other procedures   [x] Review HEP    [] Progressed/Changed HEP, detail:    [] Other detail:       Objective Information/Functional Measures/Assessment    Pt reported significant improvement following last treatment which he

## 2024-03-19 ENCOUNTER — HOSPITAL ENCOUNTER (OUTPATIENT)
Facility: HOSPITAL | Age: 79
Setting detail: RECURRING SERIES
Discharge: HOME | End: 2024-03-22
Payer: MEDICARE

## 2024-03-19 PROCEDURE — 97018 PARAFFIN BATH THERAPY: CPT

## 2024-03-19 PROCEDURE — 97530 THERAPEUTIC ACTIVITIES: CPT

## 2024-03-19 PROCEDURE — 97140 MANUAL THERAPY 1/> REGIONS: CPT

## 2024-03-19 NOTE — PROGRESS NOTES
Specifics   15  99178 Therapeutic Exercise (timed):  increase ROM, strength, coordination, balance, and proprioception to improve patient's ability to progress to PLOF and address remaining functional goals. (see flow sheet as applicable)    Details if applicable:       15  36131 Manual Therapy (timed):  decrease pain, increase ROM, and increase tissue extensibility to improve patient's ability to progress to PLOF and address remaining functional goals.  The manual therapy interventions were performed at a separate and distinct time from the therapeutic activities interventions . Details: cross friction, place and hold    Details if applicable:                    30  [facility] Totals Reminder: bill using total billable min of TIMED therapeutic procedures (example: do not include dry needle or estim unattended, both untimed codes, in totals to left)  8-22 min = 1 unit; 23-37 min = 2 units; 38-52 min = 3 units; 53-67 min = 4 units; 68-82 min = 5 units   Total Total     TOTAL TREATMENT TIME:        38     [x]  Patient Education billed concurrently with other procedures   [x] Review HEP    [] Progressed/Changed HEP, detail:    [] Other detail:       Objective Information/Functional Measures/Assessment    Pt reports he is able to perform fist position better now. Measurements taken post paraffin and manual therapy to demonstrate left middle finger total range of motion to 265 degrees with full extension of Left middle finger MCPJ. Left  70#. Issued yellow theraputty and exercise handout.    Patient will continue to benefit from skilled PT / OT services to modify and progress therapeutic interventions, analyze and address functional mobility deficits, analyze and address ROM deficits, analyze and address strength deficits, and analyze and address soft tissue restrictions to address functional deficits and attain remaining goals.    Progress toward goals / Updated goals:  []  See Progress Note/Recertification   Term Goals:

## 2024-03-28 ENCOUNTER — HOSPITAL ENCOUNTER (OUTPATIENT)
Facility: HOSPITAL | Age: 79
Setting detail: RECURRING SERIES
Discharge: HOME | End: 2024-03-31
Payer: MEDICARE

## 2024-03-28 PROCEDURE — 97110 THERAPEUTIC EXERCISES: CPT

## 2024-03-28 PROCEDURE — 97140 MANUAL THERAPY 1/> REGIONS: CPT

## 2024-03-28 PROCEDURE — 97018 PARAFFIN BATH THERAPY: CPT

## 2024-03-28 NOTE — PROGRESS NOTES
PHYSICAL / OCCUPATIONAL THERAPY - DAILY TREATMENT NOTE     Patient Name: Elfego Butler Jr.    Date: 3/28/2024    : 1945  Insurance: Payor: MEDICARE / Plan: MEDICARE PART A AND B / Product Type: *No Product type* /      Patient  verified {YES   Visit #   Current / Total 4 8   Time   In / Out 8:45 9:30   Pain   In / Out 0 0   Subjective Functional Status/Changes: \"My chief issue is when extend my finger with weight bearing then pain is 5/10\"   Changes to:  Allergies, Med Hx, Sx Hx?   no       TREATMENT AREA =  Left hand pain [M79.642]    OBJECTIVE    Modalities Rationale:     decrease edema, decrease inflammation, decrease pain, increase tissue extensibility, and increase muscle contraction/control to improve patient's ability to progress to PLOF and address remaining functional goals.                                                    8 min [x]  Paraffin,  details: Left hand    min []  Vasopneumatic Device, press/temp:              min []  Other:    Skin assessment post-treatment (if applicable):    [x]  intact    [x]  redness- no adverse reaction                 []redness - adverse reaction:         Therapeutic Procedures:  Tx Min Billable or 1:1 Min (if diff from Tx Min) Procedure, Rationale, Specifics   14  53163 Manual Therapy (timed):  decrease pain, increase ROM, increase tissue extensibility, decrease edema, and decrease trigger points to improve patient's ability to progress to PLOF and address remaining functional goals.  The manual therapy interventions were performed at a separate and distinct time from the therapeutic activities interventions . Details: cross friction massage on scar    Details if applicable:       23  66142 Therapeutic Exercise (timed):  increase ROM, strength, coordination, balance, and proprioception to improve patient's ability to progress to PLOF and address remaining functional goals. (see flow sheet as applicable)    Details if applicable:                    37  [facility]

## 2024-04-02 ENCOUNTER — HOSPITAL ENCOUNTER (OUTPATIENT)
Facility: HOSPITAL | Age: 79
Setting detail: RECURRING SERIES
Discharge: HOME | End: 2024-04-05
Payer: MEDICARE

## 2024-04-02 PROCEDURE — 97035 APP MDLTY 1+ULTRASOUND EA 15: CPT

## 2024-04-02 PROCEDURE — 97140 MANUAL THERAPY 1/> REGIONS: CPT

## 2024-04-02 PROCEDURE — 97018 PARAFFIN BATH THERAPY: CPT

## 2024-04-02 PROCEDURE — 97110 THERAPEUTIC EXERCISES: CPT

## 2024-04-02 NOTE — PROGRESS NOTES
PHYSICAL / OCCUPATIONAL THERAPY - DAILY TREATMENT NOTE     Patient Name: Elfego Butler Jr.    Date: 2024    : 1945  Insurance: Payor: MEDICARE / Plan: MEDICARE PART A AND B / Product Type: *No Product type* /      Patient  verified {YES   Visit #   Current / Total 5 8   Time   In / Out 8:45 9:30   Pain   In / Out 0 0   Subjective Functional Status/Changes: Pt states any time he is idle his hand stiffens up   Changes to:  Allergies, Med Hx, Sx Hx?   no       TREATMENT AREA =  Left hand pain [M79.642]    OBJECTIVE    Modalities Rationale:     decrease edema, decrease inflammation, decrease pain, increase tissue extensibility, and increase muscle contraction/control to improve patient's ability to progress to PLOF and address remaining functional goals.                               8 min [x]  Ultrasound, settings/location:  3.3 MHZ CONT 0.8 W/CM                  10 min [x]  Paraffin,  details: LEFT HAND                    min []  Other:    Skin assessment post-treatment (if applicable):    []  intact    [x]  redness- no adverse reaction                 []redness - adverse reaction:         Therapeutic Procedures:  Tx Min Billable or 1:1 Min (if diff from Tx Min) Procedure, Rationale, Specifics   10  21116 Therapeutic Exercise (timed):  increase ROM, strength, coordination, balance, and proprioception to improve patient's ability to progress to PLOF and address remaining functional goals. (see flow sheet as applicable)    Details if applicable:       65 [id number] Manual Therapy (timed):  decrease pain, increase ROM, increase tissue extensibility, decrease edema, and decrease trigger points to improve patient's ability to progress to PLOF and address remaining functional goals.  The manual therapy interventions were performed at a separate and distinct time from the therapeutic activities interventions . Details: JOINT MOBS    Details if applicable:                    17  [facility] Totals Reminder: bill using

## 2024-04-04 ENCOUNTER — HOSPITAL ENCOUNTER (OUTPATIENT)
Facility: HOSPITAL | Age: 79
Setting detail: RECURRING SERIES
Discharge: HOME | End: 2024-04-07
Payer: MEDICARE

## 2024-04-04 PROCEDURE — 97035 APP MDLTY 1+ULTRASOUND EA 15: CPT

## 2024-04-04 PROCEDURE — 97110 THERAPEUTIC EXERCISES: CPT

## 2024-04-04 NOTE — PROGRESS NOTES
[] Progressed/Changed HEP, detail:    [] Other detail:       Objective Information/Functional Measures/Assessment  Pt is gaining strength and fluid AROM of left hand.  Pre treatment measurements left  75#  AROM left middle finger MCPJ 0/80 PIPJ 0/85 DIP 0/80 SAINI 245   Pt displays left middle MCPJ extension as 0 however pt has 10 degrees hyper extension in his other digits and states it feels \"strange to him to not be able to hyperextend\" his middle finger.      Patient will continue to benefit from skilled PT / OT services to modify and progress therapeutic interventions, analyze and address functional mobility deficits, analyze and address ROM deficits, analyze and address strength deficits, and analyze and address soft tissue restrictions to address functional deficits and attain remaining goals.    Progress toward goals / Updated goals:  []  See Progress Note/Recertification  SHORT TERM GOALS :to be met in 2 treatments  Patient will be compliant with HEP to progress toward goals and restore functional mobility.   Eval Status:will issue at next visit  3/14/24 issued tendon glides  3/19/24 issued theraputty (yellow) and exercise handout  3/28/24 added red putty to existing putty to increase resistance  4/2/24 verbally instructed on terminal extension stretches of left middle finger     Patient will improve FOTO score by 10 points to improve functional tolerance for exercise.   Eval Status: FOTO 54  FOTO score = an established functional score where 100 = no disability     Patient will improve pain in left hand  to 1/10  to improve activities of daily living tolerance and restore prior level of function.  Eval Status:  2/10  3/14/24 0/10 pain today  3/19/24 0/10 pain at rest today pain with extension and end range 3/10 but returns to 0/10 once movement  is released  3/28/24 0/10 at rest, 5/10 with terminal extension land weight bearing left middle finger  4/2/24 0/10 at rest 4/10 with terminal extension and

## 2024-04-09 ENCOUNTER — HOSPITAL ENCOUNTER (OUTPATIENT)
Facility: HOSPITAL | Age: 79
Setting detail: RECURRING SERIES
Discharge: HOME | End: 2024-04-12
Payer: MEDICARE

## 2024-04-09 PROCEDURE — 97140 MANUAL THERAPY 1/> REGIONS: CPT

## 2024-04-09 PROCEDURE — 97110 THERAPEUTIC EXERCISES: CPT

## 2024-04-09 NOTE — PROGRESS NOTES
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  Eval Status:65#  3/19/24 70#   4/2/24 74#  4/9/24 75#        Long Term Goals: To be accomplished in 8 treatments:  Patient will be independent with HEP to progress toward goals of 100% demonstration.  Eval Status:no hep  3/14/24 issued tendon glides and scar pad for pm use  3/19/24 issued theraputty with exercise handout  3/28/24 added more resistive putty to yellow   4/9/24 displays independent with HEP updates     Patient will improve FOTO score to 72  points to improve functional tolerance for household chores.  Eval Status: FOTO 59  FOTO score = an established functional score where 100 = no disability     Patient  will have total ROM 0-260 to aid in functional mechanics for ADLs.  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  Eval Status: 65#  4/2/24 74#    4/4/24 75#   Patient will improve pain in to 0/10 to improve pain  tolerance and restore prior level of function.  Eval Status: 2/10  3/28/24 0/10 at rest, 5/10 with weight bearing on extended hand  4/9/24 0/10 at rest, 4/10 with digit extension and end range gripping     PLAN  {YES  Continue plan of care  [x]  Upgrade activities as tolerated  []  Discharge due to :  []  Other:    Johanne Garcia OT    4/9/2024    8:36 AM    Future Appointments   Date Time Provider Department Center   4/9/2024  9:00 [facility] [place]   4/11/2024  9:00 AM Johanne Garcia OT [facility] [place]   5/13/2024 10:15 AM Az Agosto DO [facility]   2/14/2025  9:20 AM Ashley Christy DO [facility]

## 2024-04-11 ENCOUNTER — HOSPITAL ENCOUNTER (OUTPATIENT)
Facility: HOSPITAL | Age: 79
Setting detail: RECURRING SERIES
Discharge: HOME | End: 2024-04-14
Payer: MEDICARE

## 2024-04-11 PROCEDURE — 97110 THERAPEUTIC EXERCISES: CPT

## 2024-04-11 PROCEDURE — 97140 MANUAL THERAPY 1/> REGIONS: CPT

## 2024-04-11 NOTE — DISCHARGE SUMMARY
some exercises.  He has an  updated HEP and demonstrates excellent understanding.  He reports is able to perform his ADLs and IADLS at home with little to none discomfort.      RECOMMENDATIONS:  [x]Discontinue therapy: [x]Patient has reached or is progressing toward set goals      []Patient is non-compliant or has abdicated      []Due to lack of appreciable progress towards set goals    Johanne Garcia OT 4/11/2024 10:58 AM

## 2024-04-11 NOTE — PROGRESS NOTES
PHYSICAL / OCCUPATIONAL THERAPY - DAILY TREATMENT NOTE     Patient Name: Elfego Butler Jr.    Date: 2024    : 1945  Insurance: Payor: MEDICARE / Plan: MEDICARE PART A AND B / Product Type: *No Product type* /      Patient  verified Yes     Visit #   Current / Total 8 8   Time   In / Out 845 9:30   Pain   In / Out 0 0   Subjective Functional Status/Changes: \"I have definitely gotten better and this has helped tremendously\"   Changes to:  Allergies, Med Hx, Sx Hx?   yes       TREATMENT AREA =  Left hand pain [M79.642]    OBJECTIVE      Therapeutic Procedures:  Tx Min Billable or 1:1 Min (if diff from Tx Min) Procedure, Rationale, Specifics   15  91879 Manual Therapy (timed):  decrease pain, increase ROM, increase tissue extensibility, decrease edema, and decrease trigger points to improve patient's ability to progress to PLOF and address remaining functional goals.  The manual therapy interventions were performed at a separate and distinct time from the therapeutic activities interventions . Details: IASTM,kinesio    Details if applicable:       30  40397 Therapeutic Exercise (timed):  increase ROM, strength, coordination, balance, and proprioception to improve patient's ability to progress to PLOF and address remaining functional goals. (see flow sheet as applicable)    Details if applicable:                    45  [facility] Totals Reminder: bill using total billable min of TIMED therapeutic procedures (example: do not include dry needle or estim unattended, both untimed codes, in totals to left)  8-22 min = 1 unit; 23-37 min = 2 units; 38-52 min = 3 units; 53-67 min = 4 units; 68-82 min = 5 units   Total Total     TOTAL TREATMENT TIME:        45     [x]  Patient Education billed concurrently with other procedures   [x] Review HEP    [] Progressed/Changed HEP, detail:    [] Other detail:       Objective Information/Functional Measures/Assessment     Pt has made excellent gains in therapy. He presents with

## 2024-05-13 ENCOUNTER — OFFICE VISIT (OUTPATIENT)
Age: 79
End: 2024-05-13
Payer: MEDICARE

## 2024-05-13 VITALS — BODY MASS INDEX: 26.08 KG/M2 | HEIGHT: 71 IN

## 2024-05-13 DIAGNOSIS — M72.0 DUPUYTREN'S DISEASE OF PALM OF LEFT HAND: ICD-10-CM

## 2024-05-13 DIAGNOSIS — M65.341 TRIGGER RING FINGER OF RIGHT HAND: Primary | ICD-10-CM

## 2024-05-13 DIAGNOSIS — Z98.890 S/P TRIGGER FINGER RELEASE: ICD-10-CM

## 2024-05-13 DIAGNOSIS — M65.322 TRIGGER INDEX FINGER OF LEFT HAND: ICD-10-CM

## 2024-05-13 PROCEDURE — 1036F TOBACCO NON-USER: CPT | Performed by: ORTHOPAEDIC SURGERY

## 2024-05-13 PROCEDURE — 1123F ACP DISCUSS/DSCN MKR DOCD: CPT | Performed by: ORTHOPAEDIC SURGERY

## 2024-05-13 PROCEDURE — G8427 DOCREV CUR MEDS BY ELIG CLIN: HCPCS | Performed by: ORTHOPAEDIC SURGERY

## 2024-05-13 PROCEDURE — 20550 NJX 1 TENDON SHEATH/LIGAMENT: CPT | Performed by: ORTHOPAEDIC SURGERY

## 2024-05-13 PROCEDURE — 99213 OFFICE O/P EST LOW 20 MIN: CPT | Performed by: ORTHOPAEDIC SURGERY

## 2024-05-13 PROCEDURE — G8417 CALC BMI ABV UP PARAM F/U: HCPCS | Performed by: ORTHOPAEDIC SURGERY

## 2024-05-13 RX ORDER — LIDOCAINE HYDROCHLORIDE 10 MG/ML
1 INJECTION, SOLUTION INFILTRATION; PERINEURAL ONCE
Status: COMPLETED | OUTPATIENT
Start: 2024-05-13 | End: 2024-05-13

## 2024-05-13 RX ADMIN — LIDOCAINE HYDROCHLORIDE 1 ML: 10 INJECTION, SOLUTION INFILTRATION; PERINEURAL at 10:30

## 2024-05-13 NOTE — PROGRESS NOTES
Elfego Butler Jr. is a 78 y.o. male right handed retiree.  Worker's Compensation and legal considerations: none    Chief Complaint   Patient presents with    Hand Pain     Bilateral hand pain     Pain Score:   0 - No pain    HPI: Patient presents today with new problem of right ring finger pain and locking as well as recurrence of left index trigger finger.  He reports the previous right trigger thumb injection he received resolved his symptoms.  He also reports that occupational therapy for his left hand stiffness has significantly improved his symptoms.    3/4/2024 HPI: Patient presents today for a postoperative visit right at 3 months status post left middle trigger finger release on 12/5/2023.  He reports his range of motion has slightly improved since his last evaluation.  He also reports a nodule in the palm of his hand as well as significant hypersensitivity over the scar. He has trouble making a full fist. Additionally, he adds that his right thumb is painful and locking.    Initial HPI: Patient presents today with complaints of left index and middle finger pain and locking.    Date of onset: Chronic with recent recurrence in 2022  Injury: No  Prior Treatment:  Yes: Comment: Right trigger thumb injection x 1.  Left index trigger finger injections , left middle finger trigger finger release    ROS: Review of Systems - General ROS: negative except HPI    Past Medical History:   Diagnosis Date    Arthritis     CAD (coronary artery disease) 2018    CABG -  2019 - 5 vessel.    Diabetes ([facility])     Hypertension     Kidney disease, chronic, stage III (GFR 30-59 ml/min) ([facility]) 11/2023    Creat 1.33 , GFR <55       Past Surgical History:   Procedure Laterality Date    CARDIAC SURGERY  2019    [address]     CHOLECYSTECTOMY  11/12/2015        ELBOW FRACTURE SURGERY  1959    FINGER TRIGGER RELEASE Left 12/5/2023    LEFT MIDDLE FINGER TRIGGER RELEASE performed by Az Agosto DO at [facility] MAIN OR

## 2024-07-23 RX ORDER — ATORVASTATIN CALCIUM 40 MG/1
40 TABLET, FILM COATED ORAL NIGHTLY
Qty: 90 TABLET | Refills: 3 | Status: SHIPPED | OUTPATIENT
Start: 2024-07-23

## 2024-12-12 ENCOUNTER — OFFICE VISIT (OUTPATIENT)
Age: 79
End: 2024-12-12

## 2024-12-12 VITALS — WEIGHT: 182.98 LBS | HEIGHT: 71 IN | BODY MASS INDEX: 25.62 KG/M2

## 2024-12-12 DIAGNOSIS — M65.341 TRIGGER RING FINGER OF RIGHT HAND: ICD-10-CM

## 2024-12-12 DIAGNOSIS — M65.311 TRIGGER THUMB OF RIGHT HAND: ICD-10-CM

## 2024-12-12 DIAGNOSIS — M65.322 TRIGGER INDEX FINGER OF LEFT HAND: Primary | ICD-10-CM

## 2024-12-12 DIAGNOSIS — M65.351 TRIGGER LITTLE FINGER OF RIGHT HAND: ICD-10-CM

## 2024-12-12 RX ORDER — LIDOCAINE HYDROCHLORIDE 10 MG/ML
1.5 INJECTION, SOLUTION INFILTRATION; PERINEURAL ONCE
Status: COMPLETED | OUTPATIENT
Start: 2024-12-12 | End: 2024-12-12

## 2024-12-12 RX ADMIN — LIDOCAINE HYDROCHLORIDE 1.5 ML: 10 INJECTION, SOLUTION INFILTRATION; PERINEURAL at 14:12

## 2024-12-12 NOTE — PROGRESS NOTES
He is alert and oriented to person, place, and time.   Psychiatric:         Mood and Affect: Mood normal.         Behavior: Behavior normal.          Hand:    Examination L Digit(s) R Digit(s)   1st CMC Tenderness -  -    1st CMC Grind -  -    Srikanth Nodes -  -    Heberden Nodes -  -    A1 Pulley Tenderness + IF + RF, SF, Th   Triggering +  +    UCL Instability -  -    RCL Instability -  -    Lateral Stress Pain -  -    Palmar Cords -  -    Tabletop test -  -    Garrod's Pads -  -     Strength       Pinch Strength         ROM: Full        Imaging:     None indicated      Impression     Diagnosis Orders   1. Trigger index finger of left hand  SCHEDULE SURGERY      2. Trigger ring finger of right hand  INJECT TENDON SHEATH/LIGAMENT    lidocaine 1 % injection 1.5 mL    triamcinolone acetonide (KENALOG) injection 15 mg      3. Trigger thumb of right hand  lidocaine 1 % injection 1.5 mL    triamcinolone acetonide (KENALOG) injection 15 mg    INJECT TENDON SHEATH/LIGAMENT      4. Trigger little finger of right hand  lidocaine 1 % injection 1.5 mL    triamcinolone acetonide (KENALOG) injection 15 mg    INJECT TENDON SHEATH/LIGAMENT            Plan:     Schedule left index trigger finger release.    Right thumb ring and little trigger finger injections.    Discussed operative versus nonoperative treatment, postoperative convalescence, and answered all questions.  We discussed the risks, benefits, and alternatives to procedure/surgery including non operative management. All identified risk factors were discussed with the patient.       Return for Surgical Clearace Follow-up.     Plan was reviewed with patient, who verbalized agreement and understanding of the plan    [facility]  [address]  [place] [address]   OFFICE PROCEDURE PROGRESS NOTE        Chart reviewed for the following:   IAz DO, have reviewed the History,

## 2024-12-17 DIAGNOSIS — E11.9 DIABETES MELLITUS WITHOUT COMPLICATION (HCC): ICD-10-CM

## 2024-12-17 DIAGNOSIS — M65.341 TRIGGER RING FINGER OF RIGHT HAND: ICD-10-CM

## 2024-12-17 DIAGNOSIS — M65.322 TRIGGER INDEX FINGER OF LEFT HAND: ICD-10-CM

## 2024-12-17 DIAGNOSIS — Z01.818 PREOP EXAMINATION: Primary | ICD-10-CM

## 2025-01-20 ENCOUNTER — TELEPHONE (OUTPATIENT)
Age: 80
End: 2025-01-20

## 2025-01-20 ENCOUNTER — HOSPITAL ENCOUNTER (OUTPATIENT)
Facility: HOSPITAL | Age: 80
Discharge: HOME | End: 2025-01-23
Payer: MEDICARE

## 2025-01-20 ENCOUNTER — PREP FOR PROCEDURE (OUTPATIENT)
Age: 80
End: 2025-01-20

## 2025-01-20 DIAGNOSIS — M65.322 TRIGGER INDEX FINGER OF LEFT HAND: ICD-10-CM

## 2025-01-20 DIAGNOSIS — M65.341 TRIGGER RING FINGER OF RIGHT HAND: ICD-10-CM

## 2025-01-20 DIAGNOSIS — Z01.818 PREOP EXAMINATION: ICD-10-CM

## 2025-01-20 DIAGNOSIS — E11.9 DIABETES MELLITUS WITHOUT COMPLICATION (HCC): ICD-10-CM

## 2025-01-20 LAB
ALBUMIN SERPL-MCNC: 4.2 G/DL (ref 3.4–5)
ALBUMIN/GLOB SERPL: 1.3 (ref 0.8–1.7)
ALP SERPL-CCNC: 62 U/L (ref 45–117)
ALT SERPL-CCNC: 58 U/L (ref 16–61)
ANION GAP SERPL CALC-SCNC: 6 MMOL/L (ref 3–18)
AST SERPL-CCNC: 24 U/L (ref 10–38)
BASOPHILS # BLD: 0.05 K/UL (ref 0–0.1)
BASOPHILS NFR BLD: 0.7 % (ref 0–2)
BILIRUB SERPL-MCNC: 0.6 MG/DL (ref 0.2–1)
BUN SERPL-MCNC: 24 MG/DL (ref 7–18)
BUN/CREAT SERPL: 17 (ref 12–20)
CALCIUM SERPL-MCNC: 9.8 MG/DL (ref 8.5–10.1)
CHLORIDE SERPL-SCNC: 109 MMOL/L (ref 100–111)
CO2 SERPL-SCNC: 27 MMOL/L (ref 21–32)
CREAT SERPL-MCNC: 1.44 MG/DL (ref 0.6–1.3)
DIFFERENTIAL METHOD BLD: NORMAL
EOSINOPHIL # BLD: 0.14 K/UL (ref 0–0.4)
EOSINOPHIL NFR BLD: 2 % (ref 0–5)
ERYTHROCYTE [DISTWIDTH] IN BLOOD BY AUTOMATED COUNT: 13.2 % (ref 11.6–14.5)
EST. AVERAGE GLUCOSE BLD GHB EST-MCNC: 143 MG/DL
GLOBULIN SER CALC-MCNC: 3.2 G/DL (ref 2–4)
GLUCOSE SERPL-MCNC: 185 MG/DL (ref 74–99)
HBA1C MFR BLD: 6.6 % (ref 4.2–5.6)
HCT VFR BLD AUTO: 43.2 % (ref 36–48)
HGB BLD-MCNC: 13.9 G/DL (ref 13–16)
IMM GRANULOCYTES # BLD AUTO: 0.01 K/UL (ref 0–0.04)
IMM GRANULOCYTES NFR BLD AUTO: 0.1 % (ref 0–0.5)
LYMPHOCYTES # BLD: 1.91 K/UL (ref 0.9–3.6)
LYMPHOCYTES NFR BLD: 27.8 % (ref 21–52)
MCH RBC QN AUTO: 29.3 PG (ref 24–34)
MCHC RBC AUTO-ENTMCNC: 32.2 G/DL (ref 31–37)
MCV RBC AUTO: 91.1 FL (ref 78–100)
MONOCYTES # BLD: 0.54 K/UL (ref 0.05–1.2)
MONOCYTES NFR BLD: 7.9 % (ref 3–10)
NEUTS SEG # BLD: 4.22 K/UL (ref 1.8–8)
NEUTS SEG NFR BLD: 61.5 % (ref 40–73)
NRBC # BLD: 0 K/UL (ref 0–0.01)
NRBC BLD-RTO: 0 PER 100 WBC
PLATELET # BLD AUTO: 234 K/UL (ref 135–420)
PMV BLD AUTO: 10.6 FL (ref 9.2–11.8)
POTASSIUM SERPL-SCNC: 6.2 MMOL/L (ref 3.5–5.5)
PROT SERPL-MCNC: 7.4 G/DL (ref 6.4–8.2)
RBC # BLD AUTO: 4.74 M/UL (ref 4.35–5.65)
SODIUM SERPL-SCNC: 142 MMOL/L (ref 136–145)
WBC # BLD AUTO: 6.9 K/UL (ref 4.6–13.2)

## 2025-01-20 PROCEDURE — 80053 COMPREHEN METABOLIC PANEL: CPT

## 2025-01-20 PROCEDURE — 36415 COLL VENOUS BLD VENIPUNCTURE: CPT

## 2025-01-20 PROCEDURE — 85025 COMPLETE CBC W/AUTO DIFF WBC: CPT

## 2025-01-20 PROCEDURE — 83036 HEMOGLOBIN GLYCOSYLATED A1C: CPT

## 2025-01-20 NOTE — TELEPHONE ENCOUNTER
Please call PAT and let them know this patient's potassium was 6.2 so they can let anesthesia know. Let us know if they see any reason to defer the case. I have let the patient's PCP know.

## 2025-01-21 LAB
EKG ATRIAL RATE: 68 BPM
EKG DIAGNOSIS: NORMAL
EKG P AXIS: 51 DEGREES
EKG P-R INTERVAL: 154 MS
EKG Q-T INTERVAL: 412 MS
EKG QRS DURATION: 124 MS
EKG QTC CALCULATION (BAZETT): 438 MS
EKG R AXIS: -55 DEGREES
EKG T AXIS: 22 DEGREES
EKG VENTRICULAR RATE: 68 BPM

## 2025-01-24 NOTE — TELEPHONE ENCOUNTER
Received the following back from [place]:    I spoke to Dr. Multani this morning, the patient's potassium would need to be in normal range for surgery.     Also, called patient's PCP this morning.  stated that Mr. Butler was seen with them on 1/21 and had labs redrawn, however, they have been closed since Wednesday due to the weather and Dr. Razo's note was not completed and lab results were not back.

## 2025-01-24 NOTE — PROGRESS NOTES
Instructions for your surgery at [facility]      Today's Date:  1/24/2025      Patient's Name:  Elfego Butler Jr.           Surgery Date:  03/04/2025              Please enter the main entrance of the hospital and check-in at the front security desk located in the lobby. They will direct you to the area to report for your surgery.     Do NOT eat or drink anything, including candy, gum, or ice chips after midnight prior to your surgery, unless you have specific instructions from your surgeon or anesthesia provider to do so.  Brush your teeth before coming to the hospital. You may swish with water, but do not swallow.  No smoking/Vaping/E-Cigarettes 24 hours prior to the day of surgery.  No alcohol 24 hours prior to the day of surgery.  No recreational drugs for one week prior to the day of surgery.  Bring Photo ID, Insurance information, and Co-pay if required on day of surgery.  Bring in pertinent legal documents, such as, Medical Power of , DNR, Advance Directive, etc.  Leave all valuables, including money/purse, weapons at home.  Remove all jewelry, including ALL body piercings, nail polish, acrylic nails, and makeup (including mascara); no lotions, powders, deodorant, or perfume/cologne/after shave on the skin.  Follow instruction for Hibiclens washes and CHG wipes from surgeon's office.   Glasses and dentures may be worn to the hospital. They must be removed prior to surgery. Please bring case/container for glasses or dentures.   Contact lenses should not be worn on day of surgery.   Call your doctor's office if symptoms of a cold or illness develop within 24-48 hours prior to your surgery.  Call your doctor's office if you have any questions concerning insurance or co-pays.  15. AN ADULT (relative or friend 18 years or older) MUST DRIVE YOU HOME AFTER YOUR SURGERY.  16. Please make arrangements for a responsible adult (18 years or older) to be with you for 24 hours after your

## 2025-01-25 SDOH — HEALTH STABILITY: PHYSICAL HEALTH: ON AVERAGE, HOW MANY DAYS PER WEEK DO YOU ENGAGE IN MODERATE TO STRENUOUS EXERCISE (LIKE A BRISK WALK)?: 0 DAYS

## 2025-01-27 ENCOUNTER — HOSPITAL ENCOUNTER (OUTPATIENT)
Facility: HOSPITAL | Age: 80
Discharge: HOME | End: 2025-01-30
Payer: MEDICARE

## 2025-01-27 DIAGNOSIS — Z01.818 PREOP EXAMINATION: Primary | ICD-10-CM

## 2025-01-27 LAB — POTASSIUM SERPL-SCNC: 5.1 MMOL/L (ref 3.5–5.5)

## 2025-01-27 PROCEDURE — 84132 ASSAY OF SERUM POTASSIUM: CPT

## 2025-01-27 PROCEDURE — 36415 COLL VENOUS BLD VENIPUNCTURE: CPT

## 2025-01-28 ENCOUNTER — OFFICE VISIT (OUTPATIENT)
Age: 80
End: 2025-01-28
Payer: COMMERCIAL

## 2025-01-28 VITALS
TEMPERATURE: 97.5 F | OXYGEN SATURATION: 97 % | HEART RATE: 82 BPM | WEIGHT: 179 LBS | HEIGHT: 71 IN | SYSTOLIC BLOOD PRESSURE: 135 MMHG | BODY MASS INDEX: 25.06 KG/M2 | DIASTOLIC BLOOD PRESSURE: 76 MMHG

## 2025-01-28 DIAGNOSIS — S39.012A LUMBOSACRAL STRAIN, INITIAL ENCOUNTER: Primary | ICD-10-CM

## 2025-01-28 DIAGNOSIS — Z98.890 HISTORY OF LUMBAR LAMINECTOMY: ICD-10-CM

## 2025-01-28 DIAGNOSIS — M51.379 DEGENERATIVE DISC DISEASE AT L5-S1 LEVEL: ICD-10-CM

## 2025-01-28 DIAGNOSIS — M47.816 LUMBAR SPONDYLOSIS: ICD-10-CM

## 2025-01-28 PROCEDURE — 99203 OFFICE O/P NEW LOW 30 MIN: CPT | Performed by: PHYSICAL MEDICINE & REHABILITATION

## 2025-01-28 PROCEDURE — 1126F AMNT PAIN NOTED NONE PRSNT: CPT | Performed by: PHYSICAL MEDICINE & REHABILITATION

## 2025-01-28 PROCEDURE — 1036F TOBACCO NON-USER: CPT | Performed by: PHYSICAL MEDICINE & REHABILITATION

## 2025-01-28 PROCEDURE — 72110 X-RAY EXAM L-2 SPINE 4/>VWS: CPT | Performed by: PHYSICAL MEDICINE & REHABILITATION

## 2025-01-28 PROCEDURE — G8428 CUR MEDS NOT DOCUMENT: HCPCS | Performed by: PHYSICAL MEDICINE & REHABILITATION

## 2025-01-28 PROCEDURE — G8420 CALC BMI NORM PARAMETERS: HCPCS | Performed by: PHYSICAL MEDICINE & REHABILITATION

## 2025-01-28 PROCEDURE — 1123F ACP DISCUSS/DSCN MKR DOCD: CPT | Performed by: PHYSICAL MEDICINE & REHABILITATION

## 2025-01-28 RX ORDER — BACLOFEN 10 MG/1
5-10 TABLET ORAL 2 TIMES DAILY PRN
Qty: 60 TABLET | Refills: 0 | Status: SHIPPED | OUTPATIENT
Start: 2025-01-28

## 2025-01-28 RX ORDER — FLUOROURACIL 50 MG/G
CREAM TOPICAL
COMMUNITY
Start: 2025-01-22

## 2025-01-28 NOTE — PROGRESS NOTES
Elfego Butler Jr. presents today for   Chief Complaint   Patient presents with    Lower Back Pain       Is someone accompanying this pt? no    Is the patient using any DME equipment during OV? no    Coordination of Care:  1. Have you been to the ER, urgent care clinic since your last visit? no  Hospitalized since your last visit? no    2. Have you seen or consulted any other health care providers outside of the [facility] since your last visit? Yes, ortho and dermatology Include any pap smears or colon screening. no      
CANCER FOR 1 MONTH FOR 30 DAYS (Patient not taking: Reported on 1/28/2025)       No current facility-administered medications for this visit.             I, Kirsten Romero MD, personally performed the services described in this documentation. I have authorized the scribe to complete the medical record entries input within this chart. I have reviewed the chart and agree that the record reflects my personal performance and is accurate and complete. [Electronically Signed: Kirsten Romero MD. 1/28/2025 3:44 PM ]    Portions of this note was created using Dragon transcription software and may contain unintended errors.

## 2025-01-29 ENCOUNTER — OFFICE VISIT (OUTPATIENT)
Age: 80
End: 2025-01-29
Payer: MEDICARE

## 2025-01-29 VITALS
HEART RATE: 77 BPM | WEIGHT: 178 LBS | DIASTOLIC BLOOD PRESSURE: 68 MMHG | HEIGHT: 71 IN | SYSTOLIC BLOOD PRESSURE: 120 MMHG | OXYGEN SATURATION: 97 % | BODY MASS INDEX: 24.92 KG/M2

## 2025-01-29 DIAGNOSIS — Z01.810 PREOP CARDIOVASCULAR EXAM: Primary | ICD-10-CM

## 2025-01-29 DIAGNOSIS — I25.10 CORONARY ARTERY DISEASE INVOLVING NATIVE CORONARY ARTERY OF NATIVE HEART WITHOUT ANGINA PECTORIS: ICD-10-CM

## 2025-01-29 PROCEDURE — G8420 CALC BMI NORM PARAMETERS: HCPCS | Performed by: INTERNAL MEDICINE

## 2025-01-29 PROCEDURE — 1123F ACP DISCUSS/DSCN MKR DOCD: CPT | Performed by: INTERNAL MEDICINE

## 2025-01-29 PROCEDURE — 1159F MED LIST DOCD IN RCRD: CPT | Performed by: INTERNAL MEDICINE

## 2025-01-29 PROCEDURE — 99214 OFFICE O/P EST MOD 30 MIN: CPT | Performed by: INTERNAL MEDICINE

## 2025-01-29 PROCEDURE — 1160F RVW MEDS BY RX/DR IN RCRD: CPT | Performed by: INTERNAL MEDICINE

## 2025-01-29 PROCEDURE — G8427 DOCREV CUR MEDS BY ELIG CLIN: HCPCS | Performed by: INTERNAL MEDICINE

## 2025-01-29 PROCEDURE — 1126F AMNT PAIN NOTED NONE PRSNT: CPT | Performed by: INTERNAL MEDICINE

## 2025-01-29 PROCEDURE — 1036F TOBACCO NON-USER: CPT | Performed by: INTERNAL MEDICINE

## 2025-01-29 ASSESSMENT — PATIENT HEALTH QUESTIONNAIRE - PHQ9
1. LITTLE INTEREST OR PLEASURE IN DOING THINGS: NOT AT ALL
SUM OF ALL RESPONSES TO PHQ QUESTIONS 1-9: 0
SUM OF ALL RESPONSES TO PHQ QUESTIONS 1-9: 0
SUM OF ALL RESPONSES TO PHQ9 QUESTIONS 1 & 2: 0
SUM OF ALL RESPONSES TO PHQ QUESTIONS 1-9: 0
SUM OF ALL RESPONSES TO PHQ QUESTIONS 1-9: 0
2. FEELING DOWN, DEPRESSED OR HOPELESS: NOT AT ALL

## 2025-01-29 NOTE — PROGRESS NOTES
Elfego Butler Jr.    Chief Complaint   Patient presents with    Follow-up     1 yr f/u     Cardiac Clearance     Lt index trigger finger release with  2/4/25        HPI    Elfego Butler Jr. is a 79 y.o. gentleman here for routine follow up of CAD, but also preop risk for upcoming hand/ finger surgery.    I met Lazaro many years ago, when he had a NSTEMI at [facility] ER complaining of persistent chest pain despite a negative nuclear stress test (December 2018).  His cath 2/4/ 2019 revealed MV CAD (see report below) and he underwent CABG.     His EF remained normal 60%. He progressed remarkably well with cardiac rehab. He had some fluctuations in blood pressure so we made med adjustments over the years. More recently he has lost a significant amount of weight so he became more sensitive/ orthostatic to several meds (HCTZ, Metoprolol). He has great BP control on his current regimen. His blood sugars are followed closely by his PCP.     He has no cardiovascular complaints.    Past Medical History:   Diagnosis Date    Arthritis     CAD (coronary artery disease) 2018    CABG -  2019 - 5 vessel.    Diabetes ([facility])     Hypertension     Kidney disease, chronic, stage III (GFR 30-59 ml/min) ([facility]) 11/2023    Creat 1.33 , GFR <55    MI (myocardial infarction) ([facility]) 2-3-2019       Past Surgical History:   Procedure Laterality Date    CARDIAC SURGERY  2019    [address]     CHOLECYSTECTOMY  11/12/2015        ELBOW FRACTURE SURGERY  1959    FINGER TRIGGER RELEASE Left 12/5/2023    LEFT MIDDLE FINGER TRIGGER RELEASE performed by Az Agosto DO at [facility] MAIN OR    LUMBAR DISC SURGERY      X 2    TUMOR REMOVAL      Back of neck       Current Outpatient Medications   Medication Sig Dispense Refill    atorvastatin (LIPITOR) 40 MG tablet Take 1 tablet by mouth nightly 90 tablet 3    omeprazole (PRILOSEC) 20 MG delayed release capsule Take 1 capsule by mouth daily      glimepiride (AMARYL) 2 MG tablet Take

## 2025-01-29 NOTE — PROGRESS NOTES
Elfego Butler Jr. presents today for   Chief Complaint   Patient presents with    Follow-up     1 yr f/u     Cardiac Clearance     Lt index trigger finger release with  2/4/25        Elfego Butler Jr. preferred language for health care discussion is english/other.    Is someone accompanying this pt? yes    Is the patient using any DME equipment during OV? no    Depression Screening:  Depression: Not at risk (1/29/2025)    PHQ-2     PHQ-2 Score: 0        Learning Assessment:  Who is the primary learner? Patient    What is the preferred language for health care of the primary learner? ENGLISH    How does the primary learner prefer to learn new concepts? DEMONSTRATION    Answered By patient    Relationship to Learner SELF           Pt currently taking Anticoagulant therapy? no    Pt currently taking Antiplatelet therapy ? no      Coordination of Care:  1. Have you been to the ER, urgent care clinic since your last visit? Hospitalized since your last visit? no    2. Have you seen or consulted any other health care providers outside of the [facility] since your last visit? Include any pap smears or colon screening. no

## 2025-01-30 ENCOUNTER — OFFICE VISIT (OUTPATIENT)
Age: 80
End: 2025-01-30
Payer: COMMERCIAL

## 2025-01-30 VITALS
WEIGHT: 178 LBS | DIASTOLIC BLOOD PRESSURE: 75 MMHG | BODY MASS INDEX: 24.92 KG/M2 | SYSTOLIC BLOOD PRESSURE: 124 MMHG | HEIGHT: 71 IN

## 2025-01-30 DIAGNOSIS — Z01.818 PRE-OPERATIVE EXAM: ICD-10-CM

## 2025-01-30 DIAGNOSIS — M65.322 TRIGGER INDEX FINGER OF LEFT HAND: Primary | ICD-10-CM

## 2025-01-30 DIAGNOSIS — M72.0 DUPUYTREN'S DISEASE OF PALM OF LEFT HAND: ICD-10-CM

## 2025-01-30 PROCEDURE — G8420 CALC BMI NORM PARAMETERS: HCPCS

## 2025-01-30 PROCEDURE — 1160F RVW MEDS BY RX/DR IN RCRD: CPT

## 2025-01-30 PROCEDURE — 1159F MED LIST DOCD IN RCRD: CPT

## 2025-01-30 PROCEDURE — 99212 OFFICE O/P EST SF 10 MIN: CPT

## 2025-01-30 PROCEDURE — 1036F TOBACCO NON-USER: CPT

## 2025-01-30 PROCEDURE — 1123F ACP DISCUSS/DSCN MKR DOCD: CPT

## 2025-01-30 PROCEDURE — G8427 DOCREV CUR MEDS BY ELIG CLIN: HCPCS

## 2025-01-30 PROCEDURE — 1126F AMNT PAIN NOTED NONE PRSNT: CPT

## 2025-01-30 RX ORDER — SODIUM CHLORIDE 0.9 % (FLUSH) 0.9 %
5-40 SYRINGE (ML) INJECTION PRN
OUTPATIENT
Start: 2025-01-30

## 2025-01-30 RX ORDER — SODIUM CHLORIDE 0.9 % (FLUSH) 0.9 %
5-40 SYRINGE (ML) INJECTION EVERY 12 HOURS SCHEDULED
OUTPATIENT
Start: 2025-01-30

## 2025-01-30 RX ORDER — SODIUM CHLORIDE 9 MG/ML
INJECTION, SOLUTION INTRAVENOUS PRN
OUTPATIENT
Start: 2025-01-30

## 2025-01-30 NOTE — H&P
Elfego Butler Jr. is a 79 y.o. male right handed retiree.  Worker's Compensation and legal considerations: none    Chief Complaint   Patient presents with    Follow-up     Left index      Pain Score:   0 - No pain    01/30/25 H&P HPI: Patient presents today for a preoperative history and physical exam as they are scheduled for a left index trigger finger release on 2/4/2025 with Dr. Agosto. Patient denies any changes to their medical history since their last evaluation with us. He saw cardiology yesterday.    Initial preop labs were significant for hyperkalemia however repeat was within normal range.    12/12/2024 HPI: Patient presents today with complaints of recurrent left index trigger finger as well as right ring little and thumb trigger fingers.  He is interested in setting up for surgery on the left side but would like injections for the right.    5/13/2024 HPI: Patient presents today with new problem of right ring finger pain and locking as well as recurrence of left index trigger finger.  He reports the previous right trigger thumb injection he received resolved his symptoms.  He also reports that occupational therapy for his left hand stiffness has significantly improved his symptoms.    Initial HPI: Patient presents today with complaints of left index and middle finger pain and locking.    Date of onset: Chronic with recent recurrence in 2022  Injury: No  Prior Treatment:  Yes: Comment: Right trigger thumb injection x 1.  Left index trigger finger injections , left middle finger trigger finger release    ROS: Review of Systems - General ROS: negative except HPI    Past Medical History:   Diagnosis Date    Arthritis     CAD (coronary artery disease) 2018    CABG -  2019 - 5 vessel.    Diabetes ([facility])     Hypertension     Kidney disease, chronic, stage III (GFR 30-59 ml/min) ([facility]) 11/2023    Creat 1.33 , GFR <55    MI (myocardial infarction) ([facility]) 2-3-2019       Past Surgical History:   Procedure Laterality

## 2025-02-03 ENCOUNTER — ANESTHESIA EVENT (OUTPATIENT)
Facility: HOSPITAL | Age: 80
End: 2025-02-03
Payer: MEDICARE

## 2025-02-04 ENCOUNTER — HOSPITAL ENCOUNTER (OUTPATIENT)
Facility: HOSPITAL | Age: 80
Setting detail: OUTPATIENT SURGERY
Discharge: HOME | End: 2025-02-04
Attending: ORTHOPAEDIC SURGERY | Admitting: ORTHOPAEDIC SURGERY
Payer: MEDICARE

## 2025-02-04 ENCOUNTER — ANESTHESIA (OUTPATIENT)
Facility: HOSPITAL | Age: 80
End: 2025-02-04
Payer: MEDICARE

## 2025-02-04 VITALS
OXYGEN SATURATION: 98 % | DIASTOLIC BLOOD PRESSURE: 80 MMHG | BODY MASS INDEX: 24.64 KG/M2 | HEIGHT: 71 IN | RESPIRATION RATE: 18 BRPM | SYSTOLIC BLOOD PRESSURE: 140 MMHG | TEMPERATURE: 97.7 F | HEART RATE: 71 BPM | WEIGHT: 176 LBS

## 2025-02-04 DIAGNOSIS — M65.322 TRIGGER INDEX FINGER OF LEFT HAND: Primary | ICD-10-CM

## 2025-02-04 LAB — GLUCOSE BLD STRIP.AUTO-MCNC: 172 MG/DL (ref 70–110)

## 2025-02-04 PROCEDURE — 7100000010 HC PHASE II RECOVERY - FIRST 15 MIN: Performed by: ORTHOPAEDIC SURGERY

## 2025-02-04 PROCEDURE — 94761 N-INVAS EAR/PLS OXIMETRY MLT: CPT

## 2025-02-04 PROCEDURE — 7100000000 HC PACU RECOVERY - FIRST 15 MIN: Performed by: ORTHOPAEDIC SURGERY

## 2025-02-04 PROCEDURE — 82962 GLUCOSE BLOOD TEST: CPT

## 2025-02-04 PROCEDURE — 6370000000 HC RX 637 (ALT 250 FOR IP): Performed by: NURSE ANESTHETIST, CERTIFIED REGISTERED

## 2025-02-04 PROCEDURE — 7100000001 HC PACU RECOVERY - ADDTL 15 MIN: Performed by: ORTHOPAEDIC SURGERY

## 2025-02-04 PROCEDURE — 6360000002 HC RX W HCPCS

## 2025-02-04 PROCEDURE — 2580000003 HC RX 258: Performed by: NURSE ANESTHETIST, CERTIFIED REGISTERED

## 2025-02-04 PROCEDURE — 6360000002 HC RX W HCPCS: Performed by: NURSE ANESTHETIST, CERTIFIED REGISTERED

## 2025-02-04 PROCEDURE — 3700000001 HC ADD 15 MINUTES (ANESTHESIA): Performed by: ORTHOPAEDIC SURGERY

## 2025-02-04 PROCEDURE — 3600000012 HC SURGERY LEVEL 2 ADDTL 15MIN: Performed by: ORTHOPAEDIC SURGERY

## 2025-02-04 PROCEDURE — 2709999900 HC NON-CHARGEABLE SUPPLY: Performed by: ORTHOPAEDIC SURGERY

## 2025-02-04 PROCEDURE — 2500000003 HC RX 250 WO HCPCS: Performed by: ORTHOPAEDIC SURGERY

## 2025-02-04 PROCEDURE — 7100000011 HC PHASE II RECOVERY - ADDTL 15 MIN: Performed by: ORTHOPAEDIC SURGERY

## 2025-02-04 PROCEDURE — 3600000002 HC SURGERY LEVEL 2 BASE: Performed by: ORTHOPAEDIC SURGERY

## 2025-02-04 PROCEDURE — 6360000002 HC RX W HCPCS: Performed by: ORTHOPAEDIC SURGERY

## 2025-02-04 PROCEDURE — 3700000000 HC ANESTHESIA ATTENDED CARE: Performed by: ORTHOPAEDIC SURGERY

## 2025-02-04 RX ORDER — FENTANYL CITRATE 50 UG/ML
25 INJECTION, SOLUTION INTRAMUSCULAR; INTRAVENOUS EVERY 5 MIN PRN
Status: DISCONTINUED | OUTPATIENT
Start: 2025-02-04 | End: 2025-02-04 | Stop reason: HOSPADM

## 2025-02-04 RX ORDER — FAMOTIDINE 20 MG/1
20 TABLET, FILM COATED ORAL ONCE
Status: COMPLETED | OUTPATIENT
Start: 2025-02-04 | End: 2025-02-04

## 2025-02-04 RX ORDER — NALOXONE HYDROCHLORIDE 0.4 MG/ML
INJECTION, SOLUTION INTRAMUSCULAR; INTRAVENOUS; SUBCUTANEOUS PRN
Status: DISCONTINUED | OUTPATIENT
Start: 2025-02-04 | End: 2025-02-04 | Stop reason: HOSPADM

## 2025-02-04 RX ORDER — HYDROCODONE BITARTRATE AND ACETAMINOPHEN 5; 325 MG/1; MG/1
1 TABLET ORAL EVERY 6 HOURS PRN
Qty: 12 TABLET | Refills: 0 | Status: SHIPPED | OUTPATIENT
Start: 2025-02-04 | End: 2025-02-07

## 2025-02-04 RX ORDER — SODIUM CHLORIDE 0.9 % (FLUSH) 0.9 %
5-40 SYRINGE (ML) INJECTION EVERY 12 HOURS SCHEDULED
Status: DISCONTINUED | OUTPATIENT
Start: 2025-02-04 | End: 2025-02-04 | Stop reason: HOSPADM

## 2025-02-04 RX ORDER — INSULIN LISPRO 100 [IU]/ML
0-15 INJECTION, SOLUTION INTRAVENOUS; SUBCUTANEOUS ONCE
Status: COMPLETED | OUTPATIENT
Start: 2025-02-04 | End: 2025-02-04

## 2025-02-04 RX ORDER — SODIUM CHLORIDE 0.9 % (FLUSH) 0.9 %
5-40 SYRINGE (ML) INJECTION PRN
Status: DISCONTINUED | OUTPATIENT
Start: 2025-02-04 | End: 2025-02-04 | Stop reason: HOSPADM

## 2025-02-04 RX ORDER — LIDOCAINE HYDROCHLORIDE 20 MG/ML
INJECTION, SOLUTION EPIDURAL; INFILTRATION; INTRACAUDAL; PERINEURAL
Status: DISCONTINUED | OUTPATIENT
Start: 2025-02-04 | End: 2025-02-04 | Stop reason: SDUPTHER

## 2025-02-04 RX ORDER — FENTANYL CITRATE 50 UG/ML
50 INJECTION, SOLUTION INTRAMUSCULAR; INTRAVENOUS EVERY 5 MIN PRN
Status: DISCONTINUED | OUTPATIENT
Start: 2025-02-04 | End: 2025-02-04 | Stop reason: HOSPADM

## 2025-02-04 RX ORDER — MAGNESIUM HYDROXIDE 1200 MG/15ML
LIQUID ORAL CONTINUOUS PRN
Status: DISCONTINUED | OUTPATIENT
Start: 2025-02-04 | End: 2025-02-04 | Stop reason: HOSPADM

## 2025-02-04 RX ORDER — SODIUM CHLORIDE 9 MG/ML
INJECTION, SOLUTION INTRAVENOUS PRN
Status: DISCONTINUED | OUTPATIENT
Start: 2025-02-04 | End: 2025-02-04 | Stop reason: HOSPADM

## 2025-02-04 RX ORDER — CLINDAMYCIN PHOSPHATE 900 MG/50ML
900 INJECTION, SOLUTION INTRAVENOUS
Status: COMPLETED | OUTPATIENT
Start: 2025-02-04 | End: 2025-02-04

## 2025-02-04 RX ORDER — SODIUM CHLORIDE 9 MG/ML
INJECTION, SOLUTION INTRAVENOUS CONTINUOUS
Status: DISCONTINUED | OUTPATIENT
Start: 2025-02-04 | End: 2025-02-04 | Stop reason: HOSPADM

## 2025-02-04 RX ORDER — SODIUM CHLORIDE, SODIUM LACTATE, POTASSIUM CHLORIDE, CALCIUM CHLORIDE 600; 310; 30; 20 MG/100ML; MG/100ML; MG/100ML; MG/100ML
INJECTION, SOLUTION INTRAVENOUS CONTINUOUS
Status: DISCONTINUED | OUTPATIENT
Start: 2025-02-04 | End: 2025-02-04 | Stop reason: HOSPADM

## 2025-02-04 RX ORDER — PROPOFOL 10 MG/ML
INJECTION, EMULSION INTRAVENOUS
Status: DISCONTINUED | OUTPATIENT
Start: 2025-02-04 | End: 2025-02-04 | Stop reason: SDUPTHER

## 2025-02-04 RX ORDER — ONDANSETRON 2 MG/ML
4 INJECTION INTRAMUSCULAR; INTRAVENOUS
Status: DISCONTINUED | OUTPATIENT
Start: 2025-02-04 | End: 2025-02-04 | Stop reason: HOSPADM

## 2025-02-04 RX ORDER — PROCHLORPERAZINE EDISYLATE 5 MG/ML
5 INJECTION INTRAMUSCULAR; INTRAVENOUS
Status: DISCONTINUED | OUTPATIENT
Start: 2025-02-04 | End: 2025-02-04 | Stop reason: HOSPADM

## 2025-02-04 RX ADMIN — PROPOFOL 50 MG: 10 INJECTION, EMULSION INTRAVENOUS at 09:13

## 2025-02-04 RX ADMIN — INSULIN LISPRO 3 UNITS: 100 INJECTION, SOLUTION INTRAVENOUS; SUBCUTANEOUS at 08:25

## 2025-02-04 RX ADMIN — PROPOFOL 50 MG: 10 INJECTION, EMULSION INTRAVENOUS at 09:03

## 2025-02-04 RX ADMIN — PROPOFOL 50 MG: 10 INJECTION, EMULSION INTRAVENOUS at 09:18

## 2025-02-04 RX ADMIN — FAMOTIDINE 20 MG: 20 TABLET, FILM COATED ORAL at 08:28

## 2025-02-04 RX ADMIN — PROPOFOL 50 MG: 10 INJECTION, EMULSION INTRAVENOUS at 09:08

## 2025-02-04 RX ADMIN — LIDOCAINE HYDROCHLORIDE 100 MG: 20 INJECTION, SOLUTION EPIDURAL; INFILTRATION; INTRACAUDAL; PERINEURAL at 09:03

## 2025-02-04 RX ADMIN — CLINDAMYCIN PHOSPHATE 900 MG: 900 INJECTION, SOLUTION INTRAVENOUS at 09:07

## 2025-02-04 RX ADMIN — SODIUM CHLORIDE: 9 INJECTION, SOLUTION INTRAVENOUS at 08:21

## 2025-02-04 ASSESSMENT — PAIN - FUNCTIONAL ASSESSMENT: PAIN_FUNCTIONAL_ASSESSMENT: 0-10

## 2025-02-04 ASSESSMENT — PAIN SCALES - GENERAL: PAINLEVEL_OUTOF10: 0

## 2025-02-04 NOTE — BRIEF OP NOTE
Brief Postoperative Note      Patient: Elfego Butler Jr.  YOB: 1945  MRN: [id number]    Date of Procedure: 2/4/2025    Pre-Op Diagnosis Codes:      * Trigger index finger of left hand [M65.322]    Post-Op Diagnosis: Post-Op Diagnosis Codes:     * Trigger index finger of left hand [M65.322]       Procedure(s):  Left Index Trigger Finger Release    Surgeon(s):  Az Agosto DO    Assistant:  Surgical Assistant: Jocelyne Lake    Anesthesia: Monitor Anesthesia Care    Estimated Blood Loss (mL): less than 50     Complications: None    Specimens:   * No specimens in log *    Implants:  * No implants in log *      Drains: * No LDAs found *    Findings:  Infection Present At Time Of Surgery (PATOS) (choose all levels that have infection present):  No infection present  Other Findings: thickened a1 pulley    Electronically signed by Az Agosto DO on 2/4/2025 at 9:26 AM

## 2025-02-04 NOTE — PERIOP NOTE
Patient /Family /Designee has been informed that [facility] is not responsible for patient belongings per policy and the signed [facility] Patient Agreement document.  Personal items should be sent home or checked in with security.  Patient /Family /Designee selected the following action:                            [x]  Send personal items home with a family member or friend                                                 []  Check in personal items with security, excluding clothing                            []  Maintain personal items at the bedside, against recommendation                                 by Geraldo Thibodeaux [facility]                                   ** If patient /family /designee chooses to maintain personal items at the bedside,                                      Complete the patient belongings inventory in the EMR.

## 2025-02-04 NOTE — ANESTHESIA POSTPROCEDURE EVALUATION
Department of Anesthesiology  Postprocedure Note    Patient: Elfego Butler Jr.  MRN: [id number]  YOB: 1945  Date of evaluation: 2/4/2025    Procedure Summary       Date: 02/04/25 Room / Location: [facility] MAIN 03 / [facility] MAIN OR    Anesthesia Start: 0900 Anesthesia Stop: 0933    Procedure: Left Index Trigger Finger Release (Left: Hand) Diagnosis:       Trigger index finger of left hand      (Trigger index finger of left hand [M65.322])    Surgeons: Az Agosto DO Responsible Provider: Tera Multani Jr., MD    Anesthesia Type: MAC ASA Status: 3            Anesthesia Type: No value filed.    Óscar Phase I: Óscar Score: 10    Óscar Phase II: Óscar Score: 10    Anesthesia Post Evaluation    Patient location during evaluation: bedside  Airway patency: patent  Cardiovascular status: hemodynamically stable  Respiratory status: acceptable  Hydration status: stable  Pain management: adequate    No notable events documented.

## 2025-02-04 NOTE — OP NOTE
Operative Note      Patient: Elfego Butler Jr.  YOB: 1945  MRN: [id number]    Date of Procedure: 2/4/2025    Pre-Op Diagnosis Codes:      * Trigger index finger of left hand [M65.322]    Post-Op Diagnosis: Post-Op Diagnosis Codes:     * Trigger index finger of left hand [M65.322]       Procedure(s):  Left Index Trigger Finger Release    Surgeon(s):  Az Agosto DO    Assistant:   Surgical Assistant: Jocelyne Lake    Anesthesia: Monitor Anesthesia Care    Estimated Blood Loss (mL): Minimal    Complications: None    Specimens:   * No specimens in log *    Implants:  * No implants in log *      Drains: * No LDAs found *    Findings:  Infection Present At Time Of Surgery (PATOS) (choose all levels that have infection present):  No infection present  Other Findings: Thickened A1 pulley    Detailed Description of Procedure:     Indications for procedure been outlined in the perioperative documentation, most notably being not amenable to conservative treatment.    Informed consent was obtained from the patient.  The risks and benefits of the procedure were discussed with the patient.  They include but are not limited to neurovascular injury, tendon/ligamentous injury, blood loss, infection, incomplete release of tendons, incomplete relief of symptoms, need for further surgery, hematoma, neuroma, seroma, chronic pain, chronic stiffness, complications from anesthesia including death, and the possibility of karson Covid.    After informed consent was obtained, the patient was taken back to the operative suite.  A tourniquet was applied to the operative extremity and it was prepped and draped in the normal sterile fashion.  The arm was exsanguinated and the tourniquet was elevated to 200 mmHg.    Attention was then turned to the distal palmar crease overlying the A1 pulley.  An incision was made over the crease approximately a 1 cm in length.  The subcutaneous tissues were dissected and electrocautery was

## 2025-02-04 NOTE — ANESTHESIA PRE PROCEDURE
Department of Anesthesiology  Preprocedure Note       Name:  Elfego Butler Jr.   Age:  79 y.o.  :  1945                                          MRN:  [id number]         Date:  2025      Surgeon: Surgeon(s):  Az Agosto DO    Procedure: Procedure(s):  Left Index Trigger Finger Release    Medications prior to admission:   Prior to Admission medications    Medication Sig Start Date End Date Taking? Authorizing Provider   HYDROcodone-acetaminophen (NORCO) 5-325 MG per tablet Take 1 tablet by mouth every 6 hours as needed for Pain for up to 3 days. Intended supply: 3 days. Take lowest dose possible to manage pain Max Daily Amount: 4 tablets 25 Yes Az Agosto DO   baclofen (LIORESAL) 10 MG tablet Take 0.5-1 tablets by mouth 2 times daily as needed (pain/spasm) 25  Yes iKrsten Romero MD   atorvastatin (LIPITOR) 40 MG tablet Take 1 tablet by mouth nightly 24  Yes Heena Hughes APRN - NP   omeprazole (PRILOSEC) 20 MG delayed release capsule Take 1 capsule by mouth daily 10/17/23  Yes Provider, Historical, MD   glimepiride (AMARYL) 2 MG tablet Take 1.5 tablets by mouth every morning (before breakfast)   Yes Provider, Historical, MD   metFORMIN (GLUCOPHAGE) 500 MG tablet Take 1 tablet by mouth 2 times daily   Yes Provider, Historical, MD   aspirin 81 MG EC tablet Take 1 tablet by mouth daily Currently on hold for upcoming surgery 19  Yes Provider, MD Steven   fluticasone (FLONASE) 50 MCG/ACT nasal spray 1 spray by Nasal route daily 19  Yes Provider, Historical, MD   olmesartan (BENICAR) 40 MG tablet Take 1 tablet by mouth daily   Yes Provider, Historical, MD   OZEMPIC, 1 MG/DOSE, 4 MG/3ML SOPN Inject 1 mg into the skin Every 10 days 23  Yes Provider, MD Steven   fluorouracil (EFUDEX) 5 % cream APPLY 1 THIN APPLICATION TWICE A DAY TO PRECANCERS/SKIN CANCER FOR 1 MONTH FOR 30 DAYS  Patient not taking: Reported on 2025   Provider

## 2025-02-04 NOTE — H&P
Update History & Physical    The patient's History and Physical of January 30, 2025 was reviewed with the patient and I examined the patient. There was no change. The surgical site was confirmed by the patient and me.     Plan: The risks, benefits, expected outcome, and alternative to the recommended procedure have been discussed with the patient. Patient understands and wants to proceed with the procedure.     Electronically signed by Az Agosto DO on 2/4/2025 at 8:30 AM

## 2025-02-18 ENCOUNTER — OFFICE VISIT (OUTPATIENT)
Age: 80
End: 2025-02-18

## 2025-02-18 VITALS — WEIGHT: 176 LBS | HEIGHT: 70 IN | BODY MASS INDEX: 25.2 KG/M2

## 2025-02-18 DIAGNOSIS — Z98.890 S/P TRIGGER FINGER RELEASE: ICD-10-CM

## 2025-02-18 DIAGNOSIS — L76.82 POSTOPERATIVE COMPLICATION OF SKIN INVOLVING DRAINAGE FROM SURGICAL WOUND: ICD-10-CM

## 2025-02-18 DIAGNOSIS — M65.322 TRIGGER INDEX FINGER OF LEFT HAND: Primary | ICD-10-CM

## 2025-02-18 PROCEDURE — 99024 POSTOP FOLLOW-UP VISIT: CPT

## 2025-02-18 RX ORDER — SULFAMETHOXAZOLE AND TRIMETHOPRIM 800; 160 MG/1; MG/1
1 TABLET ORAL 2 TIMES DAILY
Qty: 10 TABLET | Refills: 0 | Status: SHIPPED | OUTPATIENT
Start: 2025-02-18 | End: 2025-02-23

## 2025-02-18 NOTE — PROGRESS NOTES
Elfego Butler Jr. is a 79 y.o. male right handed retiree.  Worker's Compensation and legal considerations: none    Chief Complaint   Patient presents with    Post-Op Check     Left index      Pain Score:   0 - No pain    02/18/25 HPI: Patient presents today for a post operative visit approximately 2 weeks s/p left index finger trigger finger release on 2/4/2025 with Dr. Agosto. Today, the patient reports pain with use, numbness about the digit that started yesterday which he isolates to the level of p2 volarly, drainage from the surgical site but unsure what color just some spots on the bandaid, and redness about the surgical site. He also is having pain with direct pressure over the surgical site.    01/30/25 H&P HPI: Patient presents today for a preoperative history and physical exam as they are scheduled for a left index trigger finger release on 2/4/2025 with Dr. Agosto. Patient denies any changes to their medical history since their last evaluation with us. He saw cardiology yesterday.    Initial preop labs were significant for hyperkalemia however repeat was within normal range.    12/12/2024 HPI: Patient presents today with complaints of recurrent left index trigger finger as well as right ring little and thumb trigger fingers.  He is interested in setting up for surgery on the left side but would like injections for the right.    5/13/2024 HPI: Patient presents today with new problem of right ring finger pain and locking as well as recurrence of left index trigger finger.  He reports the previous right trigger thumb injection he received resolved his symptoms.  He also reports that occupational therapy for his left hand stiffness has significantly improved his symptoms.    Initial HPI: Patient presents today with complaints of left index and middle finger pain and locking.    Date of onset: Chronic with recent recurrence in 2022  Injury: No  Prior Treatment:  Yes: Comment: Right trigger thumb injection x 2.

## 2025-03-06 ENCOUNTER — OFFICE VISIT (OUTPATIENT)
Age: 80
End: 2025-03-06

## 2025-03-06 VITALS — WEIGHT: 176 LBS | HEIGHT: 70 IN | BODY MASS INDEX: 25.2 KG/M2

## 2025-03-06 DIAGNOSIS — M79.89 FINGER SWELLING: ICD-10-CM

## 2025-03-06 DIAGNOSIS — M25.642 FINGER STIFFNESS, LEFT: ICD-10-CM

## 2025-03-06 DIAGNOSIS — Z98.890 S/P TRIGGER FINGER RELEASE: ICD-10-CM

## 2025-03-06 DIAGNOSIS — M65.322 TRIGGER INDEX FINGER OF LEFT HAND: Primary | ICD-10-CM

## 2025-03-06 DIAGNOSIS — M72.0 DUPUYTREN'S DISEASE OF PALM OF LEFT HAND: ICD-10-CM

## 2025-03-06 PROCEDURE — 99024 POSTOP FOLLOW-UP VISIT: CPT

## 2025-03-06 RX ORDER — METHYLPREDNISOLONE 4 MG/1
TABLET ORAL
Qty: 1 KIT | Refills: 0 | Status: SHIPPED | OUTPATIENT
Start: 2025-03-06 | End: 2025-03-12

## 2025-03-06 NOTE — PROGRESS NOTES
2. S/P trigger finger release  [facility]      3. Postoperative complication of skin involving drainage from surgical wound  [facility]      4. Dupuytren's disease of palm of left hand  [facility]      5. Finger stiffness, left  [facility]      6. Finger swelling  [facility]                Plan:     Medrol Dosepak sent to pharmacy for inflammation    Again encouraged occupational therapy and advised patient that the longer this swelling and scar tissue goes without therapeutic treatment, the longer it will take to remit.  The patient verbalized understanding and states he will go to therapy at this time.    Occupational Therapy ordered to [facility]    Return in about 6 weeks (around 4/17/2025) for Dr. Agosto, OT follow up.     Plan was reviewed with patient, who verbalized agreement and understanding of the plan    Note: This note was completed using voice recognition software.  Any typographical/name errors or mistakes are unintentional.

## 2025-03-10 ENCOUNTER — TELEPHONE (OUTPATIENT)
Facility: HOSPITAL | Age: 80
End: 2025-03-10

## 2025-03-12 ENCOUNTER — HOSPITAL ENCOUNTER (OUTPATIENT)
Facility: HOSPITAL | Age: 80
Setting detail: RECURRING SERIES
Discharge: HOME | End: 2025-03-15
Payer: MEDICARE

## 2025-03-12 PROCEDURE — 97165 OT EVAL LOW COMPLEX 30 MIN: CPT

## 2025-03-12 PROCEDURE — 97110 THERAPEUTIC EXERCISES: CPT

## 2025-03-12 PROCEDURE — 97535 SELF CARE MNGMENT TRAINING: CPT

## 2025-03-12 NOTE — PROGRESS NOTES
[facility]  [address] [place] [address] Ph:[phone] Fx: [phone]    Plan of Care/Statement of Necessity for Occupational Therapy Services        Patient name: Elfego Butler Jr. Start of Care: 3/12/2025   Referral source: Anjali Cardona PA-C : 1945    Medical Diagnosis: Trigger index finger of left hand [M65.322]  S/P trigger finger release [Z98.890]  Dupuytren's disease of palm of left hand [M72.0]  Finger stiffness, left [M25.642]  Finger swelling [M79.89]   Onset Date:25    Treatment Diagnosis: M79.642  Pain in left hand     Prior Hospitalization: see medical history Provider#: [id number]     Comorbidities: Diabetes mellitus and Musculoskeletal disorders    Prior Level of Function: Pt had full use of the left hand, previous trigger finger release on middle finger last year ; pt is a right hand dominant     The Plan of Care and following information is based on the information from the initial evaluation.  Assessment/ key information:  Pt presents with decreased motion and sensitivity over incision following trigger finger release on 25. Pt overall has quality motion, wound now fully closed, according to patient this has happened over the past 3 days with no drainage. Thicken scar tissue at proximal edging in longitudinal fashion with hypersensitivity to touch. Decreased FDP excursion into composite fist though intrinsic glide is not limited. Pt educated on soft tissue healing, scar tissue massage, KT taping over incision to improve flattening/mobility of the soft tissue, and exercises to improve FDP strength. Pt able to perform with minimal discomfort, anticipate short course of therapy, will follow up in 2 weeks to gauge progress.      Patient will continue to benefit from skilled OT services to modify and progress therapeutic interventions, address functional mobility deficits, address ROM deficits, address strength

## 2025-03-12 NOTE — PROGRESS NOTES
OT DAILY TREATMENT NOTE/HAND EVAL 10-18    Patient Name: Elfego Butler Jr.    Date: 3/12/2025    : 1945  Insurance: Payor: MEDICARE / Plan: MEDICARE PART A AND B / Product Type: *No Product type* /      Patient  verified yes     Visit #   Current / Total 1 2   Time   In / Out 310 350   Pain   In / Out 0 0   Subjective Functional Status/Changes: See history    Changes to:  Meds, Allergies, Med Hx, Sx Hx?  If yes, update Summary List no       TREATMENT AREA =  Trigger index finger of left hand [M65.322]  S/P trigger finger release [Z98.890]  Dupuytren's disease of palm of left hand [M72.0]  Finger stiffness, left [M25.642]  Finger swelling [M79.89]    SUBJECTIVE  Pain Level (0-10 scale): 0  []constant [x]intermittent []improving []worsening []no change since onset    Any medication changes, allergies to medications, adverse drug reactions, diagnosis change, or new procedure performed?: [x] No    [] Yes (see summary sheet for update)  Subjective functional status/changes:     PLOF: Pt had full use of the left hand, previous trigger finger release on middle finger last year ; pt is a right hand dominant  Limitations to PLOF: Sensitivity over incision, weakness of finger tip   Mechanism of Injury: pt had trigger finger release on 25, reports delayed healing but that it is progressing, notes difficulty making a tight fist   Current symptoms/Complaints: Sensitivity over incision, weakness of finger trip  Previous Treatment/Compliance: Self exercise and movement   PMHx/Surgical Hx: Left middle finger trigger release, dupuytren cord present in palm of left hand   Work Hx: Pt retired    Living Situation: Pt independent at home  Pt Goals: full range of motion   Barriers: []pain []financial []time []transportation []other  Motivation: pt moviated   Cognition: A & O x 3    Other:    OBJECTIVE    10 min [x]Eval - untimed                           Therapeutic Procedures:  Tx Min Billable or 1:1 Min (if

## 2025-03-18 ENCOUNTER — TELEPHONE (OUTPATIENT)
Facility: HOSPITAL | Age: 80
End: 2025-03-18

## 2025-03-26 ENCOUNTER — APPOINTMENT (OUTPATIENT)
Facility: HOSPITAL | Age: 80
End: 2025-03-26
Payer: MEDICARE

## 2025-06-26 ENCOUNTER — OFFICE VISIT (OUTPATIENT)
Age: 80
End: 2025-06-26

## 2025-06-26 VITALS — BODY MASS INDEX: 25.2 KG/M2 | WEIGHT: 176 LBS | HEIGHT: 70 IN

## 2025-06-26 DIAGNOSIS — M65.341 TRIGGER RING FINGER OF RIGHT HAND: Primary | ICD-10-CM

## 2025-06-26 RX ORDER — LIDOCAINE HYDROCHLORIDE 10 MG/ML
0.5 INJECTION, SOLUTION INFILTRATION; PERINEURAL ONCE
Status: COMPLETED | OUTPATIENT
Start: 2025-06-26 | End: 2025-06-26

## 2025-06-26 RX ADMIN — LIDOCAINE HYDROCHLORIDE 0.5 ML: 10 INJECTION, SOLUTION INFILTRATION; PERINEURAL at 16:47

## 2025-06-26 NOTE — PROGRESS NOTES
Elfego Butler Jr. is a 79 y.o. male right handed retiree.  Worker's Compensation and legal considerations: none    Chief Complaint   Patient presents with    Follow-up     Right ring      Pain Score:   6    6/26/2025 HPI: Patient presents today due to recurrence of his right ring finger pain and locking.  He is requesting an injection today.    3/6/2025 HPI: Patient presents today for a postoperative visit now just over 1 month status post left index trigger finger release on 2/4/2025 with Dr. Agosto.  He voices significant concern about his incision and states it \"just won't heal.\"  He also states he cannot fully extend his digit or fully flex it.  At his last visit, occupational therapy was offered however the patient declined.    02/18/25 HPI: Patient presents today for a post operative visit approximately 2 weeks s/p left index finger trigger finger release on 2/4/2025 with Dr. Agosto. Today, the patient reports pain with use, numbness about the digit that started yesterday which he isolates to the level of p2 volarly, drainage from the surgical site but unsure what color just some spots on the bandaid, and redness about the surgical site. He also is having pain with direct pressure over the surgical site.    01/30/25 H&P HPI: Patient presents today for a preoperative history and physical exam as they are scheduled for a left index trigger finger release on 2/4/2025 with Dr. Agosto. Patient denies any changes to their medical history since their last evaluation with us. He saw cardiology yesterday.    Initial preop labs were significant for hyperkalemia however repeat was within normal range.    12/12/2024 HPI: Patient presents today with complaints of recurrent left index trigger finger as well as right ring little and thumb trigger fingers.  He is interested in setting up for surgery on the left side but would like injections for the right.    5/13/2024 HPI: Patient presents today with new problem of right

## 2025-07-22 RX ORDER — ATORVASTATIN CALCIUM 40 MG/1
40 TABLET, FILM COATED ORAL NIGHTLY
Qty: 90 TABLET | Refills: 3 | Status: SHIPPED | OUTPATIENT
Start: 2025-07-22

## (undated) DEVICE — GAUZE SPONGES,16 PLY: Brand: CURITY

## (undated) DEVICE — GAUZE,SPONGE,4"X4",12PLY,STERILE,LF,2'S: Brand: MEDLINE

## (undated) DEVICE — SYRINGE MED 30ML STD CLR PLAS LUERLOCK TIP N CTRL DISP

## (undated) DEVICE — STERILE POLYISOPRENE POWDER-FREE SURGICAL GLOVES: Brand: PROTEXIS

## (undated) DEVICE — SOLUTION IRRIG 1000ML 0.9% SOD CHL USP POUR PLAS BTL

## (undated) DEVICE — HI-TORQUE VERSACORE FLOPPY GUIDE WIRE SYSTEM 145 CM: Brand: HI-TORQUE VERSACORE

## (undated) DEVICE — SUT SLK 2 60IN TIE MP BLK --

## (undated) DEVICE — DRAPE,ANGIO,BRACH,STERILE,38X44: Brand: MEDLINE

## (undated) DEVICE — CATHETER ART 20 GAX4 CM 22 GA RADIAL FEP

## (undated) DEVICE — SUT SLK 1 30IN TIE MP BLK --

## (undated) DEVICE — NEEDLE HYPO 25GA L0.625IN BLU POLYPR HUB S STL REG BVL STR

## (undated) DEVICE — SUTURE PROL 7-0 L30IN N ABSRB BV175-8 DBL ARMED MFIL 8755H

## (undated) DEVICE — SYR LR LCK 1ML GRAD NSAF 30ML --

## (undated) DEVICE — GOWN,SIRUS,NONRNF,SETINSLV,XL,20/CS: Brand: MEDLINE

## (undated) DEVICE — SUT SLK 0 30IN SH BLK --

## (undated) DEVICE — Device

## (undated) DEVICE — CORD ES L12FT BPLR FRCP

## (undated) DEVICE — BANDAGE COMPR NO CLOSURE 4 STD WHT STRL ESMRK

## (undated) DEVICE — EZ GLIDE AORTIC CANNULA: Brand: EDWARDS LIFESCIENCES EZ GLIDE AORTIC CANNULA

## (undated) DEVICE — BANDAGE,ELASTIC,ESMARK,STERILE,4"X9',LF: Brand: MEDLINE

## (undated) DEVICE — DRAPE,HAND,STERILE: Brand: MEDLINE

## (undated) DEVICE — SUTURE ETHLN 1 L30IN NONABSORBABLE BLK CTX L48MM 1/2 CIR 830H

## (undated) DEVICE — SUTURE PERMAHAND SZ 2-0 L12X18IN NONABSORBABLE BLK SILK A185H

## (undated) DEVICE — DR LANCEY ON PUMP PACK: Brand: MEDLINE INDUSTRIES, INC.

## (undated) DEVICE — ADAPTER CARDPLG SET MGMT FEM LUER W/ CLR CODE CLMP DLP

## (undated) DEVICE — BLADE OPHTH GRN ROUNDED TIP 1 SIDE SHRP GRINDLESS MINI-BLDE

## (undated) DEVICE — SUTURE ETHBND EXCEL SZ 2-0 L36IN NONABSORBABLE GRN SH-1 X763H

## (undated) DEVICE — STRAP,POSITIONING,KNEE/BODY,FOAM,4X60": Brand: MEDLINE

## (undated) DEVICE — CANNULA PERF L2IN BLNT TIP 2MM VES CLR RADPQ BODY FEM LUER

## (undated) DEVICE — SUTURE PERMAHAND SZ 4-0 L12X30IN NONABSORBABLE BLK SILK A303H

## (undated) DEVICE — PRESSURE MONITORING SET: Brand: TRUWAVE

## (undated) DEVICE — SUT SLK 0 30IN FSL BLK --

## (undated) DEVICE — GOWN,SIRUS,FABRNF,2XL,18/CS: Brand: MEDLINE

## (undated) DEVICE — RADIFOCUS OPTITORQUE ANGIOGRAPHIC CATHETER: Brand: OPTITORQUE

## (undated) DEVICE — STERILE COTTON TIPPED APPLICATORS: Brand: PURITAN

## (undated) DEVICE — GLIDESHEATH SLENDER STAINLESS STEEL KIT: Brand: GLIDESHEATH SLENDER

## (undated) DEVICE — ZIMMER® STERILE DISPOSABLE TOURNIQUET CUFF WITH PROTECTIVE SLEEVE AND PLC, DUAL PORT, SINGLE BLADDER, 18 IN. (46 CM)

## (undated) DEVICE — PACK PROCEDURE SURG VASC CATH 161 MMC LF

## (undated) DEVICE — Z DISCONTINUED NO SUB SUTURE CHROMIC GUT SZ 4-0 L18IN ABSRB BRN L19MM PS-2 3/8 1637G

## (undated) DEVICE — STANDARD SURGICAL GOWN, L: Brand: CONVERTORS

## (undated) DEVICE — SET FLD ADMIN 3 W STPCOCK FIX FEM L BOR 1IN

## (undated) DEVICE — (D)PREP SKN CHLRAPRP APPL 26ML -- CONVERT TO ITEM 371833

## (undated) DEVICE — ROTATING SURGICAL PUNCHES, 1 PER POUCH: Brand: A&E MEDICAL / ROTATING SURGICAL PUNCHES

## (undated) DEVICE — SUTURE MCRYL SZ 4 0 L18IN ABSRB VLT PS 1 L24MM 3 8 CIR REV Y682H

## (undated) DEVICE — SUTURE CHROMIC GUT SZ 4-0 L18IN ABSRB BRN L19MM PS-2 3/8 1637G

## (undated) DEVICE — SUT WRE PACE 2-0 24IN DA BLU --

## (undated) DEVICE — FLEX ADVANTAGE 1500CC: Brand: FLEX ADVANTAGE

## (undated) DEVICE — FOGARTY SPRING CLIPS 6MM: Brand: FOGARTY SOFTJAW

## (undated) DEVICE — X-RAY SPONGES,12 PLY: Brand: DERMACEA

## (undated) DEVICE — POWDER HEMOSTAT GEL 1GR --

## (undated) DEVICE — ARGYLE FRAZIER SURGICAL SUCTION INSTRUMENT 8 FR/CH (2.7 MM): Brand: ARGYLE

## (undated) DEVICE — TOWEL,OR,DSP,ST,WHITE,DLX,4/PK,20PK/CS: Brand: MEDLINE

## (undated) DEVICE — APPLICATOR MEDICATED 26 CC SOLUTION HI LT ORNG CHLORAPREP

## (undated) DEVICE — PROCEDURE KIT FLUID MGMT 10 FR CUST MAINFOLD

## (undated) DEVICE — SUTURE PROL SZ 5-0 L36IN NONABSORBABLE BLU L13MM C-1 3/8 8720H

## (undated) DEVICE — SUT PROL 4-0 30IN SH1 DA BLU --

## (undated) DEVICE — GLOVE SURG SZ 8 CRM LTX FREE POLYISOPRENE POLYMER BEAD ANTI

## (undated) DEVICE — BANDAGE COMPR W1INXL5YD WHT COT E TAPE RUB BASE ADH CURAD

## (undated) DEVICE — BRUSH SCRB 4% CHG RED DISP --

## (undated) DEVICE — SUTURE PROL SZ 8-0 L24IN NONABSORBABLE BLU BV175-6 L8MM 3/8 8741H

## (undated) DEVICE — NDL PRT INJ NSAF BLNT 18GX1.5 --

## (undated) DEVICE — CATH FOL URETH C CARE MTR 16FR -- LUBRI-SIL

## (undated) DEVICE — REM POLYHESIVE ADULT PATIENT RETURN ELECTRODE: Brand: VALLEYLAB

## (undated) DEVICE — SUTURE VCRL SZ 2-0 L27IN ABSRB UD L26MM CT-2 1/2 CIR J269H

## (undated) DEVICE — SYS VSL HARV HEMOPRO2 VASOVIEW -- HARV SYS MINIMUM ORDER 5/EA

## (undated) DEVICE — SOLUTION IV 1000ML 0.9% SOD CHL

## (undated) DEVICE — BAND HEMOSTAT DRY D-STAT RAD --

## (undated) DEVICE — SUT PROL 6-0 30IN C1 DA BLU --

## (undated) DEVICE — PACK PROCEDURE SURG MAJ W/ BASIN LF

## (undated) DEVICE — HEART II: Brand: MEDLINE INDUSTRIES, INC.